# Patient Record
Sex: FEMALE | Race: WHITE | NOT HISPANIC OR LATINO | Employment: UNEMPLOYED | URBAN - NONMETROPOLITAN AREA
[De-identification: names, ages, dates, MRNs, and addresses within clinical notes are randomized per-mention and may not be internally consistent; named-entity substitution may affect disease eponyms.]

---

## 2023-06-08 DIAGNOSIS — F41.9 ANXIETY: Primary | ICD-10-CM

## 2023-06-09 RX ORDER — BUSPIRONE HYDROCHLORIDE 10 MG/1
TABLET ORAL
Qty: 270 TABLET | Refills: 0 | Status: SHIPPED | OUTPATIENT
Start: 2023-06-09 | End: 2023-11-22 | Stop reason: SDUPTHER

## 2023-06-14 ENCOUNTER — OFFICE VISIT (OUTPATIENT)
Dept: PRIMARY CARE | Facility: CLINIC | Age: 53
End: 2023-06-14
Payer: MEDICARE

## 2023-06-14 VITALS
BODY MASS INDEX: 32.07 KG/M2 | DIASTOLIC BLOOD PRESSURE: 68 MMHG | RESPIRATION RATE: 18 BRPM | HEIGHT: 70 IN | WEIGHT: 224 LBS | HEART RATE: 100 BPM | OXYGEN SATURATION: 98 % | SYSTOLIC BLOOD PRESSURE: 110 MMHG

## 2023-06-14 DIAGNOSIS — M25.531 PAIN IN BOTH WRISTS: ICD-10-CM

## 2023-06-14 DIAGNOSIS — Z00.00 HEALTHCARE MAINTENANCE: ICD-10-CM

## 2023-06-14 DIAGNOSIS — M62.81 MUSCLE WEAKNESS: ICD-10-CM

## 2023-06-14 DIAGNOSIS — R53.83 OTHER FATIGUE: Primary | ICD-10-CM

## 2023-06-14 DIAGNOSIS — E55.9 VITAMIN D DEFICIENCY: ICD-10-CM

## 2023-06-14 DIAGNOSIS — M25.532 PAIN IN BOTH WRISTS: ICD-10-CM

## 2023-06-14 DIAGNOSIS — G47.00 INSOMNIA, UNSPECIFIED TYPE: ICD-10-CM

## 2023-06-14 DIAGNOSIS — Z13.220 LIPID SCREENING: ICD-10-CM

## 2023-06-14 DIAGNOSIS — R20.0 BILATERAL HAND NUMBNESS: ICD-10-CM

## 2023-06-14 PROCEDURE — 99213 OFFICE O/P EST LOW 20 MIN: CPT | Performed by: NURSE PRACTITIONER

## 2023-06-14 RX ORDER — GABAPENTIN 100 MG/1
CAPSULE ORAL
COMMUNITY
Start: 2021-09-02 | End: 2023-11-22 | Stop reason: SDUPTHER

## 2023-06-14 RX ORDER — OMEPRAZOLE 40 MG/1
40 CAPSULE, DELAYED RELEASE ORAL
Qty: 30 CAPSULE | Refills: 81 | COMMUNITY
Start: 2016-12-12 | End: 2024-04-04 | Stop reason: SDUPTHER

## 2023-06-14 RX ORDER — TRAZODONE HYDROCHLORIDE 100 MG/1
TABLET ORAL
COMMUNITY
Start: 2021-09-02 | End: 2023-11-22 | Stop reason: SDUPTHER

## 2023-06-14 RX ORDER — SERTRALINE HYDROCHLORIDE 100 MG/1
TABLET, FILM COATED ORAL
COMMUNITY
Start: 2021-09-02 | End: 2023-11-22 | Stop reason: SDUPTHER

## 2023-06-14 ASSESSMENT — ENCOUNTER SYMPTOMS: FATIGUE: 1

## 2023-06-14 NOTE — PROGRESS NOTES
Subjective   Gisselle Avalos is a 53 y.o. female who presents for Fatigue (Exhausted, trouble staying asleep ).  Patient here because she needs some refills and she knows that are all this will be closing down permanently and I will no longer be a PCP with [facility].    She is overdue for blood work, mammogram and all screening.    Her acute concerns are as follows :  numb RT anterior hand between the first 2 fingers. No where else and no pain.  Could be carpal tunnel  Work on farm daily.  Heavy lifting  Helps take care of multiple people including her .  She states that she takes care of everybody else and puts herself last  Insomnia- on trazodone. Cannot fall asleep without the Trazodone but is not staying asleep. Has not had a sleep study. Never slept good. Always exhausted. Napping during the day  Chronic SOB which can occur out of no where. Breathless. Through r/t anxiety in the past but does not think so any more. I educated on stress and anxiety.  This has been years. Has had a workup which never revealed anything. We talked about stress, chronic inflammation, poor self care, nicotene use.   Denies ETOH  Daily nicotene.   No MJ or ellicit drug use.         Fatigue  Associated symptoms include fatigue.       The ROS have been reviewed otherwise negative except for what is in the HPI    Objective   Physical Exam  Constitutional:       Appearance: She is obese.   Cardiovascular:      Rate and Rhythm: Normal rate and regular rhythm.   Pulmonary:      Effort: Pulmonary effort is normal.      Breath sounds: Normal breath sounds.   Abdominal:      General: Bowel sounds are normal.      Palpations: Abdomen is soft.   Musculoskeletal:         General: Normal range of motion.   Neurological:      Mental Status: She is alert and oriented to person, place, and time.   Psychiatric:         Mood and Affect: Mood normal.         Behavior: Behavior normal.       Visit Vitals  /68   Pulse 100    Resp 18        Assessment/Plan   Problem List Items Addressed This Visit    None    Gisselle was seen today for fatigue.  Diagnoses and all orders for this visit:  Other fatigue (Primary)  Comments:  Multiple causes.  I think she has poor self-care.  Which she also admits to.  It is also related to chronic stress and anxiety.  Orders:  -     Vitamin B12; Future  -     Vitamin D, Total; Future  -     TSH with reflex to Free T4 if abnormal; Future  Muscle weakness  Comments:  .  Active on the arm otherwise no exercise and is not on a healthy well-balanced diet  Orders:  -     Vitamin B12; Future  -     Vitamin D, Total; Future  -     TSH with reflex to Free T4 if abnormal; Future  Healthcare maintenance  Comments:  Updated mammogram and colon cancer screening and updated blood work fasting.  Orders:  -     CBC and Auto Differential; Future  -     Comprehensive Metabolic Panel; Future  Lipid screening  Comments:  Update fasting lipid panel  Orders:  -     Lipid Panel; Future  Vitamin D deficiency  Comments:  This can lead to chronic depression chronic muscle fatigue and much more.  We will update vitamin D level  Orders:  -     Vitamin D, Total; Future  Pain in both wrists  Comments:  It is possible that this is arthritis causing inflammation and or carpal tunnel  Orders:  -     XR hand right 3+ views; Future  -     XR hand left 3+ views; Future  -     XR wrist left 3+ views; Future  -     XR wrist right 3+ views; Future  Bilateral hand numbness  Comments:  X-ray bilateral hands  Orders:  -     XR hand right 3+ views; Future  -     XR hand left 3+ views; Future  -     XR wrist left 3+ views; Future  -     XR wrist right 3+ views; Future  Insomnia, unspecified type  Comments:  I think she has more of a disordered sleep.  Continue trazodone for now however she needs a sleep study.     I explained to her that after 2 weeks I will no longer have access to the system.  She is not sure if she is staying with [facility] once this office closes down.  My fear is if I order a sleep study and mammogram and colon cancer screening the results will get missed which is very bad for her if there was any abnormal result.  At this time I think it would be most appropriate and safe for the patient she schedules with a new primary care provider and make sure that her routine diagnostic testing gets up-to-date and ordered by that.  She is agreeable and appreciative

## 2023-11-10 ENCOUNTER — APPOINTMENT (OUTPATIENT)
Dept: PRIMARY CARE | Facility: CLINIC | Age: 53
End: 2023-11-10
Payer: MEDICARE

## 2023-11-20 PROBLEM — M54.30 SCIATICA: Status: ACTIVE | Noted: 2023-11-20

## 2023-11-20 PROBLEM — G47.00 INSOMNIA: Status: ACTIVE | Noted: 2023-11-20

## 2023-11-20 PROBLEM — M54.16 LUMBAR RADICULOPATHY, ACUTE: Status: ACTIVE | Noted: 2023-11-20

## 2023-11-20 PROBLEM — M25.561 BILATERAL KNEE PAIN: Status: ACTIVE | Noted: 2023-11-20

## 2023-11-20 PROBLEM — G62.9 PERIPHERAL NEUROPATHY: Status: ACTIVE | Noted: 2023-11-20

## 2023-11-20 PROBLEM — M25.562 BILATERAL KNEE PAIN: Status: ACTIVE | Noted: 2023-11-20

## 2023-11-20 PROBLEM — R06.02 SOB (SHORTNESS OF BREATH) ON EXERTION: Status: ACTIVE | Noted: 2022-06-23

## 2023-11-20 PROBLEM — F17.200 NICOTINE DEPENDENCE: Status: ACTIVE | Noted: 2023-11-20

## 2023-11-20 PROBLEM — R30.0 DYSURIA: Status: ACTIVE | Noted: 2023-11-20

## 2023-11-20 PROBLEM — R92.1 BREAST CALCIFICATION, RIGHT: Status: ACTIVE | Noted: 2023-11-20

## 2023-11-20 PROBLEM — F41.9 ANXIETY: Status: ACTIVE | Noted: 2023-11-20

## 2023-11-20 PROBLEM — D64.9 ANEMIA: Status: ACTIVE | Noted: 2023-11-20

## 2023-11-20 PROBLEM — N39.0 ACUTE UTI: Status: RESOLVED | Noted: 2023-11-20 | Resolved: 2023-11-20

## 2023-11-22 ENCOUNTER — OFFICE VISIT (OUTPATIENT)
Dept: PRIMARY CARE | Facility: CLINIC | Age: 53
End: 2023-11-22
Payer: MEDICARE

## 2023-11-22 VITALS
SYSTOLIC BLOOD PRESSURE: 116 MMHG | HEART RATE: 85 BPM | DIASTOLIC BLOOD PRESSURE: 73 MMHG | TEMPERATURE: 98 F | BODY MASS INDEX: 36.34 KG/M2 | OXYGEN SATURATION: 93 % | HEIGHT: 67 IN | WEIGHT: 231.5 LBS

## 2023-11-22 DIAGNOSIS — E78.5 HYPERLIPIDEMIA, UNSPECIFIED HYPERLIPIDEMIA TYPE: ICD-10-CM

## 2023-11-22 DIAGNOSIS — R40.0 SOMNOLENCE, DAYTIME: ICD-10-CM

## 2023-11-22 DIAGNOSIS — F41.9 ANXIETY: ICD-10-CM

## 2023-11-22 DIAGNOSIS — R92.8 ABNORMAL MAMMOGRAM: ICD-10-CM

## 2023-11-22 DIAGNOSIS — G47.00 INSOMNIA, UNSPECIFIED TYPE: ICD-10-CM

## 2023-11-22 DIAGNOSIS — Z72.0 SMOKING TRYING TO QUIT: ICD-10-CM

## 2023-11-22 DIAGNOSIS — R20.0 BILATERAL HAND NUMBNESS: Primary | ICD-10-CM

## 2023-11-22 DIAGNOSIS — G62.89 OTHER POLYNEUROPATHY: ICD-10-CM

## 2023-11-22 PROCEDURE — 99214 OFFICE O/P EST MOD 30 MIN: CPT | Performed by: FAMILY MEDICINE

## 2023-11-22 PROCEDURE — 4004F PT TOBACCO SCREEN RCVD TLK: CPT | Performed by: FAMILY MEDICINE

## 2023-11-22 PROCEDURE — 90686 IIV4 VACC NO PRSV 0.5 ML IM: CPT | Performed by: FAMILY MEDICINE

## 2023-11-22 PROCEDURE — G0009 ADMIN PNEUMOCOCCAL VACCINE: HCPCS | Performed by: FAMILY MEDICINE

## 2023-11-22 PROCEDURE — G0008 ADMIN INFLUENZA VIRUS VAC: HCPCS | Performed by: FAMILY MEDICINE

## 2023-11-22 PROCEDURE — 90677 PCV20 VACCINE IM: CPT | Performed by: FAMILY MEDICINE

## 2023-11-22 RX ORDER — TRAZODONE HYDROCHLORIDE 100 MG/1
100 TABLET ORAL NIGHTLY
Qty: 90 TABLET | Refills: 1 | Status: SHIPPED | OUTPATIENT
Start: 2023-11-22 | End: 2024-04-04 | Stop reason: SDUPTHER

## 2023-11-22 RX ORDER — BUSPIRONE HYDROCHLORIDE 7.5 MG/1
7.5 TABLET ORAL 2 TIMES DAILY
Qty: 180 TABLET | Refills: 1 | Status: SHIPPED | OUTPATIENT
Start: 2023-11-22 | End: 2024-01-04 | Stop reason: SDUPTHER

## 2023-11-22 RX ORDER — GABAPENTIN 100 MG/1
100 CAPSULE ORAL 3 TIMES DAILY
Qty: 270 CAPSULE | Refills: 0 | Status: SHIPPED | OUTPATIENT
Start: 2023-11-22 | End: 2024-04-04 | Stop reason: SDUPTHER

## 2023-11-22 RX ORDER — SERTRALINE HYDROCHLORIDE 100 MG/1
100 TABLET, FILM COATED ORAL DAILY
Qty: 90 TABLET | Refills: 1 | Status: SHIPPED | OUTPATIENT
Start: 2023-11-22 | End: 2024-04-04 | Stop reason: SDUPTHER

## 2023-11-22 ASSESSMENT — ENCOUNTER SYMPTOMS
WEAKNESS: 0
SHORTNESS OF BREATH: 0
VOMITING: 0
CONFUSION: 0
DIARRHEA: 0
DIFFICULTY URINATING: 0
NUMBNESS: 0
UNEXPECTED WEIGHT CHANGE: 0
LOSS OF SENSATION IN FEET: 1
DIZZINESS: 0
TROUBLE SWALLOWING: 0
DYSURIA: 0
COUGH: 0
FEVER: 0
WHEEZING: 0
BLOOD IN STOOL: 0
OCCASIONAL FEELINGS OF UNSTEADINESS: 0
LIGHT-HEADEDNESS: 0
CHILLS: 0
DEPRESSION: 0
ABDOMINAL PAIN: 0
NAUSEA: 0

## 2023-11-22 ASSESSMENT — PATIENT HEALTH QUESTIONNAIRE - PHQ9
2. FEELING DOWN, DEPRESSED OR HOPELESS: NOT AT ALL
1. LITTLE INTEREST OR PLEASURE IN DOING THINGS: NOT AT ALL
SUM OF ALL RESPONSES TO PHQ9 QUESTIONS 1 AND 2: 0

## 2023-11-22 NOTE — PROGRESS NOTES
Subjective   Patient ID: Gisselle Avalos is a 53 y.o. female who presents for Establish Care (Pt presents as a new to you pt, c/o results of xrays done at previous office, requesting sleep study, pains shoot up the side of her neck, many pains, rx's needed.BL).  HPI    Previously a patient of Rosa Butt, last seen June 2023.    c/o numbness left hand due to old injury. In web between index and middle fingers, extending proximally in the dorsum of the hand, not past the wrist. More recently c/o numbness in the right hand. Started with swelling between index and middle finger metacarpal bones one morning. Still montrell swollen. Still numbness, no worse. Denies weakness in the hand. Tried Gabapentin, which she takes for the left hand already, Tylenol, Advil, soaks.    Has difficulty sleeping. Has had this insomnia for a long, long time. Tried Ambien remotely, caused sleep walking. Believes she may have restless leg syndrome. Sleeps only a couple hours at a time. Takes Trazodone, ZzQuil. Wakes up 6-7x every night, usually goes to the restroom to urinate. h/o bladder sling.    Sometimes snores. Occasional seeming apneic episodes. Denies morning headaches. Sometimes difficult staying awake during the day.    Smoking, is interested in quitting smoking. Quit once for 8y, gained 75 lbs when she quit then.      Review of Systems   Constitutional:  Negative for chills, fever and unexpected weight change.   HENT:  Negative for ear pain and trouble swallowing.    Respiratory:  Negative for cough, shortness of breath and wheezing.    Cardiovascular:  Negative for chest pain.   Gastrointestinal:  Negative for abdominal pain, blood in stool, diarrhea, nausea and vomiting.   Genitourinary:  Negative for difficulty urinating and dysuria.   Skin:  Negative for rash.   Neurological:  Negative for dizziness, syncope, weakness, light-headedness and numbness.   Psychiatric/Behavioral:  Negative for behavioral problems and confusion.   "        Objective   /73   Pulse 85   Temp 36.7 °C (98 °F)   Ht 1.702 m (5' 7\")   Wt 105 kg (231 lb 8 oz)   SpO2 93%   BMI 36.26 kg/m²     Physical Exam  Vitals and nursing note reviewed.   Constitutional:       General: She is not in acute distress.     Appearance: Normal appearance.   HENT:      Head: Normocephalic and atraumatic.   Eyes:      General: No scleral icterus.     Extraocular Movements: Extraocular movements intact.      Conjunctiva/sclera: Conjunctivae normal.   Pulmonary:      Effort: Pulmonary effort is normal. No respiratory distress.   Skin:     General: Skin is warm and dry.      Coloration: Skin is not jaundiced.   Neurological:      Mental Status: She is alert and oriented to person, place, and time. Mental status is at baseline.   Psychiatric:         Behavior: Behavior normal.         Assessment/Plan   Problem List Items Addressed This Visit       Anxiety     Inadequately controlled. Add BuSpar to be taken daily (was taking PRN). Return 1-2 months.         Relevant Medications    busPIRone (Buspar) 7.5 mg tablet    sertraline (Zoloft) 100 mg tablet    Insomnia     Check sleep study. Stop ZzQuil due to possible RLS.         Relevant Medications    traZODone (Desyrel) 100 mg tablet    Peripheral neuropathy     Continue Gabapentin.         Relevant Medications    gabapentin (Neurontin) 100 mg capsule    Bilateral hand numbness - Primary     Suspect compressed nerve branch. Refer to hand surgery.         Relevant Orders    Referral to Orthopaedic Surgery    Somnolence, daytime     Sleep study r/o apnea, PML.         Relevant Orders    In-Center Sleep Study (Non-Sleep Provider Only)    Smoking trying to quit    Hyperlipidemia     Recheck.         Relevant Orders    CBC    Comprehensive Metabolic Panel    Lipid Panel    TSH with reflex to Free T4 if abnormal    Abnormal mammogram     Sep 2021, check diagnostic.         Relevant Orders    BI mammo bilateral diagnostic tomosynthesis        "

## 2023-11-22 NOTE — PATIENT INSTRUCTIONS
Please schedule additional problem-focused appointment(s) to address additional problem(s).    Please return for your next wellness visit in 1-2 months.    Hand Surgery  Dr. Azael Gomez ([place]) [phone]  [facility] [phone]       For assistance with scheduling referrals or consultations, please call [phone]. For laboratory, radiology, and other tests, please call [phone] ([phone] for pediatrics). Please review prescription inserts and published information for possible adverse effects of all medications. Return after testing or consultation to review results and recommendations, if symptoms persist, change, worsen, or return, or if you have any question or concern. If you do not get results within 7-10 days, or you have any question or concern, please send a message, call the office ([phone]), or return to the office for a follow-up appointment. For non-emergencies, you may call the office. For emergencies, call 9-1-1 or go to the nearest Emergency Department. Please schedule additional appointment(s) to address concern(s) not addressed today.    In general, results are not released or discussed over the telephone. Results of tests done through [facility] are released via  Lifeables (see below).  [email or web address]   Lifeables support line: [phone]    Until we complete our transition to the new system, additional information can be found at [email or web address] or on your Android or iOS (iPhone, iPad) device using the [employer] keyshawn available free of charge in your device's keyshawn store.

## 2023-12-19 ENCOUNTER — APPOINTMENT (OUTPATIENT)
Dept: ORTHOPEDIC SURGERY | Facility: CLINIC | Age: 53
End: 2023-12-19
Payer: MEDICARE

## 2023-12-20 ENCOUNTER — HOSPITAL ENCOUNTER (OUTPATIENT)
Dept: RADIOLOGY | Facility: HOSPITAL | Age: 53
Discharge: HOME | End: 2023-12-20
Payer: MEDICARE

## 2023-12-20 ENCOUNTER — ANCILLARY PROCEDURE (OUTPATIENT)
Dept: RADIOLOGY | Facility: HOSPITAL | Age: 53
End: 2023-12-20
Payer: MEDICARE

## 2023-12-20 DIAGNOSIS — R92.8 ABNORMAL MAMMOGRAM: ICD-10-CM

## 2023-12-20 DIAGNOSIS — R92.8 OTHER ABNORMAL AND INCONCLUSIVE FINDINGS ON DIAGNOSTIC IMAGING OF BREAST: ICD-10-CM

## 2023-12-20 PROCEDURE — G0279 TOMOSYNTHESIS, MAMMO: HCPCS | Performed by: RADIOLOGY

## 2023-12-20 PROCEDURE — 77066 DX MAMMO INCL CAD BI: CPT

## 2023-12-20 PROCEDURE — 77066 DX MAMMO INCL CAD BI: CPT | Performed by: RADIOLOGY

## 2023-12-21 DIAGNOSIS — R92.8 ABNORMAL MAMMOGRAM: Primary | ICD-10-CM

## 2024-01-04 ENCOUNTER — OFFICE VISIT (OUTPATIENT)
Dept: PRIMARY CARE | Facility: CLINIC | Age: 54
End: 2024-01-04
Payer: MEDICARE

## 2024-01-04 VITALS
HEART RATE: 82 BPM | WEIGHT: 231 LBS | DIASTOLIC BLOOD PRESSURE: 74 MMHG | OXYGEN SATURATION: 95 % | BODY MASS INDEX: 36.26 KG/M2 | HEIGHT: 67 IN | SYSTOLIC BLOOD PRESSURE: 113 MMHG

## 2024-01-04 DIAGNOSIS — F41.9 ANXIETY: ICD-10-CM

## 2024-01-04 DIAGNOSIS — R92.8 ABNORMAL MAMMOGRAM: Primary | ICD-10-CM

## 2024-01-04 DIAGNOSIS — M25.50 POLYARTHRALGIA: ICD-10-CM

## 2024-01-04 PROCEDURE — 99214 OFFICE O/P EST MOD 30 MIN: CPT | Performed by: FAMILY MEDICINE

## 2024-01-04 RX ORDER — PREDNISONE 10 MG/1
TABLET ORAL
Qty: 20 TABLET | Refills: 0 | Status: SHIPPED | OUTPATIENT
Start: 2024-01-04 | End: 2024-01-04 | Stop reason: SDUPTHER

## 2024-01-04 RX ORDER — PREDNISONE 10 MG/1
TABLET ORAL
Qty: 20 TABLET | Refills: 0 | Status: SHIPPED | OUTPATIENT
Start: 2024-01-04 | End: 2024-01-17

## 2024-01-04 RX ORDER — BUSPIRONE HYDROCHLORIDE 10 MG/1
10 TABLET ORAL 2 TIMES DAILY
Qty: 180 TABLET | Refills: 1 | Status: SHIPPED | OUTPATIENT
Start: 2024-01-04 | End: 2024-04-04 | Stop reason: SDUPTHER

## 2024-01-04 ASSESSMENT — ENCOUNTER SYMPTOMS
DIARRHEA: 0
LIGHT-HEADEDNESS: 0
DYSURIA: 0
TROUBLE SWALLOWING: 0
NAUSEA: 0
CONFUSION: 0
ABDOMINAL PAIN: 0
DIZZINESS: 0
WEAKNESS: 0
FEVER: 0
UNEXPECTED WEIGHT CHANGE: 0
WHEEZING: 0
CHILLS: 0
VOMITING: 0
DIFFICULTY URINATING: 0
BLOOD IN STOOL: 0
NUMBNESS: 0
SHORTNESS OF BREATH: 0
COUGH: 0

## 2024-01-04 ASSESSMENT — PATIENT HEALTH QUESTIONNAIRE - PHQ9
SUM OF ALL RESPONSES TO PHQ9 QUESTIONS 1 AND 2: 0
1. LITTLE INTEREST OR PLEASURE IN DOING THINGS: NOT AT ALL
2. FEELING DOWN, DEPRESSED OR HOPELESS: NOT AT ALL

## 2024-01-04 NOTE — PATIENT INSTRUCTIONS
Please return for a(n) anxiety follow-up appointment in 3 months, earlier if any question or concern. Please return or seek medical attention if symptoms persist, change, worsen, or return. For emergencies, call 9-1-1 or go to the nearest Emergency Room.    Rheumatology   Dr. Melina Gallardo ([place]) [phone]  Dr. Eli Wong ([place]) [phone]  Dr. Man Rucker ([place]) [phone]  iggy Black ([place]) [phone]      Podiatry  Dr. Norah Baron ([facility]) [phone]  Dr. Evelyn Fowler ([facility]) [phone]  Dr. Ed Burnett ([place]) [phone]  Dr. Tosha Scott ([facility]) [phone]  Dr. Alice Bryan ([facility]) [phone]  Dr. Samuel Thompson ([facility]) [phone]  Dr. Meg Castillo ([place]) [phone]    Avoid taking Biotin for a week prior to any blood tests, as it can interfere with certain results.  Fasting for labs means 12 hours, nothing to eat or drink, except water and medications, unless directed otherwise.    For assistance with scheduling referrals or consultations, please call [phone]. For laboratory, radiology, and other tests, please call [phone] ([phone] for pediatrics). Please review prescription inserts and published information for possible adverse effects of all medications. Return after testing or consultation to review results and recommendations, if symptoms persist, change, worsen, or return, or if you have any question or concern. If you do not get results within 7-10 days, or you have any question or concern, please send a message, call the office ([phone]), or return to the office for a follow-up appointment. For non-emergencies, you may call the office. For emergencies, call 9-1-1 or go to the nearest Emergency Department. Please schedule additional appointment(s) to address concern(s) not addressed today.    In general, results are not  released or discussed over the telephone, but at an appointment or via  Right Skills. Results of tests done through [facility] are released via  Right Skills (see below).  [email or web address]   Right Skills support line: [phone]

## 2024-01-04 NOTE — PROGRESS NOTES
"Subjective   Patient ID: Gisselle Avalos is a 53 y.o. female who presents for Follow-up (Pt presents in med review, rx's needed.BL).  HPI    Tried Magnesium for her restless legs, made a huge difference (about \"99% better\"). Also taking Gabapentin for her arm.    c/o knees, hips, ankles, elbows, \"the whole thing.\" Has had a steroid taper before, which has helped significantly with her joint pain.    Requests referral to podiatry.    Review of Systems   Constitutional:  Negative for chills, fever and unexpected weight change.   HENT:  Negative for ear pain and trouble swallowing.    Respiratory:  Negative for cough, shortness of breath and wheezing.    Cardiovascular:  Negative for chest pain.   Gastrointestinal:  Negative for abdominal pain, blood in stool, diarrhea, nausea and vomiting.   Genitourinary:  Negative for difficulty urinating and dysuria.   Skin:  Negative for rash.   Neurological:  Negative for dizziness, syncope, weakness, light-headedness and numbness.   Psychiatric/Behavioral:  Negative for behavioral problems and confusion.          Objective   /74   Pulse 82   Ht 1.702 m (5' 7\")   Wt 105 kg (231 lb)   SpO2 95%   BMI 36.18 kg/m²     Physical Exam  Vitals and nursing note reviewed.   Constitutional:       General: She is not in acute distress.     Appearance: Normal appearance.   HENT:      Head: Normocephalic and atraumatic.   Eyes:      General: No scleral icterus.     Extraocular Movements: Extraocular movements intact.      Conjunctiva/sclera: Conjunctivae normal.   Pulmonary:      Effort: Pulmonary effort is normal. No respiratory distress.   Skin:     General: Skin is warm and dry.      Coloration: Skin is not jaundiced.   Neurological:      Mental Status: She is alert and oriented to person, place, and time. Mental status is at baseline.   Psychiatric:         Behavior: Behavior normal.         Assessment/Plan   Problem List Items Addressed This Visit       Anxiety     " Improved with regular taking of BuSpar, but increase to 10 mg BID.         Relevant Medications    busPIRone (Buspar) 10 mg tablet    Abnormal mammogram - Primary     Follow-up diagnostic left mammogram late June 2024.         Relevant Orders    BI mammo left diagnostic tomosynthesis    Polyarthralgia     Follow-up with rheumatology. Requests steroid taper, which has worked well for her in the past.         Relevant Medications    predniSONE (Deltasone) 10 mg tablet    Other Relevant Orders    Referral to Rheumatology    Referral to Podiatry     c/o heart racing which she previously attributed to anxiety, but  said she should bring it up. Advised dedicated appointment to properly address this, but to go to the ER or call 911 if it recurs. She will schedule an appointment for this.

## 2024-01-16 ENCOUNTER — CLINICAL SUPPORT (OUTPATIENT)
Dept: SLEEP MEDICINE | Facility: CLINIC | Age: 54
End: 2024-01-16
Payer: MEDICARE

## 2024-01-16 DIAGNOSIS — R40.0 SOMNOLENCE, DAYTIME: ICD-10-CM

## 2024-01-16 DIAGNOSIS — G47.33 OBSTRUCTIVE SLEEP APNEA (ADULT) (PEDIATRIC): ICD-10-CM

## 2024-01-16 PROCEDURE — 95811 POLYSOM 6/>YRS CPAP 4/> PARM: CPT | Performed by: STUDENT IN AN ORGANIZED HEALTH CARE EDUCATION/TRAINING PROGRAM

## 2024-01-17 VITALS
RESPIRATION RATE: 24 BRPM | SYSTOLIC BLOOD PRESSURE: 137 MMHG | BODY MASS INDEX: 36.33 KG/M2 | OXYGEN SATURATION: 94 % | HEIGHT: 67 IN | DIASTOLIC BLOOD PRESSURE: 78 MMHG | WEIGHT: 231.48 LBS

## 2024-01-17 ASSESSMENT — SLEEP AND FATIGUE QUESTIONNAIRES
HOW LIKELY ARE YOU TO NOD OFF OR FALL ASLEEP WHILE LYING DOWN TO REST IN THE AFTERNOON WHEN CIRCUMSTANCES PERMIT: HIGH CHANCE OF DOZING
SITING INACTIVE IN A PUBLIC PLACE LIKE A CLASS ROOM OR A MOVIE THEATER: HIGH CHANCE OF DOZING
HOW LIKELY ARE YOU TO NOD OFF OR FALL ASLEEP WHILE SITTING AND READING: HIGH CHANCE OF DOZING
HOW LIKELY ARE YOU TO NOD OFF OR FALL ASLEEP WHILE SITTING AND TALKING TO SOMEONE: HIGH CHANCE OF DOZING
HOW LIKELY ARE YOU TO NOD OFF OR FALL ASLEEP WHILE SITTING QUIETLY AFTER LUNCH WITHOUT ALCOHOL: HIGH CHANCE OF DOZING
HOW LIKELY ARE YOU TO NOD OFF OR FALL ASLEEP WHILE WATCHING TV: HIGH CHANCE OF DOZING
HOW LIKELY ARE YOU TO NOD OFF OR FALL ASLEEP WHEN YOU ARE A PASSENGER IN A CAR FOR AN HOUR WITHOUT A BREAK: HIGH CHANCE OF DOZING
ESS-CHAD TOTAL SCORE: 21
HOW LIKELY ARE YOU TO NOD OFF OR FALL ASLEEP IN A CAR, WHILE STOPPED FOR A FEW MINUTES IN TRAFFIC: WOULD NEVER DOZE

## 2024-01-17 NOTE — PROGRESS NOTES
[facility] TECH NOTE:     Patient: Gisselle Avalos   MRN//AGE: [id number]  1970  53 y.o.   Technologist: Donya Cano   Room: 4   Service Date: 2024        Sleep Testing Location: [place]  Neck: 37.5 cm  Dona Ana: 21    TECHNOLOGIST SLEEP STUDY PROCEDURE NOTE:   This sleep study is being conducted according to the policies and procedures outlined by the AAS accreditation standards.  The sleep study procedure and processes involved during this appointment was explained to the patient/patient’s family, questions were answered. The patient/family verbalized understanding.      The patient is a 53 year old female scheduled for a split night study with montage of: standard.  She arrived for her appointment.      The study that was ultimately completed was a split night study with montage of: standard.    The full study was completed.  Patient questionnaires completed?: yes     Consents signed? yes    Initial Fall Risk Screening:     Gisselle has not fallen in the last 6 months. Gisselle does not have a fear of falling. She does not need assistance with sitting, standing, or walking. She does not need assistance walking in her home. She does not need assistance in an unfamiliar setting. The patient is not using an assistive device.     Brief Study observations: Patient presented to the sleep lab for a potential split night study.  Split criteria was met.     Deviation to order/protocol and reason: n/a      If PAP, which was preferred mask/pressure/mode: CPAP / [employer] Simplus full face mask (small)      Other: n/a    After the procedure, the patient/family was informed to ensure followup with ordering clinician for testing results.      Technologist: Donya Cano

## 2024-01-26 PROBLEM — S81.859A DOG BITE OF LOWER LEG: Status: RESOLVED | Noted: 2022-09-19 | Resolved: 2024-01-26

## 2024-01-26 PROBLEM — J20.9 ACUTE BRONCHITIS: Status: RESOLVED | Noted: 2024-01-26 | Resolved: 2024-01-26
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
## 2024-01-26 PROBLEM — R55 SYNCOPE AND COLLAPSE: Status: ACTIVE | Noted: 2022-09-09

## 2024-02-13 ENCOUNTER — TELEPHONE (OUTPATIENT)
Dept: PRIMARY CARE | Facility: CLINIC | Age: 54
End: 2024-02-13
Payer: MEDICARE

## 2024-02-13 NOTE — TELEPHONE ENCOUNTER
----- Message from Alex Crowley DO sent at 2/5/2024 11:24 AM EST -----  Sleep study report describes:  Severe sleep apnea.  Some options include:  Auto-titrating CPAP (Continuous Positive Airway Pressure) therapy.  PAP (Positive Airway Pressure) titration study, then appropriate PAP therapy.  Follow-up with a sleep medicine specialist.  Sleep Medicine  Dr. Deidre Knutson, Emeka Ng PA-C, Thierry Larson ([place]) [phone]  Dr. Nadeem Sanchez ([place]) [phone]  Dr. Rafita Lawler ([place]) [phone]    Pediatric Sleep Medicine  Dr. Kaitlyn Villaseñor ([place]) [phone]  Recommend avoidance of driving or operating dangerous machinery when until daytime sleepiness resolves. Avoid alcohol and other sedating drugs or medications, as much as possible. Weight loss generally helps with obstructive sleep apnea. Avoidance of supine (i.e., on the back) sleeping posture may be beneficial.  Recommend a follow-up appointment, to discuss results and options, or if any question or concern.    If auto-CPAP is chosen, would start at 4-10 cm H2O. F&P Simplus (small). with EPR/Flex of 1.

## 2024-04-04 ENCOUNTER — OFFICE VISIT (OUTPATIENT)
Dept: PRIMARY CARE | Facility: CLINIC | Age: 54
End: 2024-04-04
Payer: MEDICARE

## 2024-04-04 ENCOUNTER — LAB (OUTPATIENT)
Dept: LAB | Facility: LAB | Age: 54
End: 2024-04-04
Payer: MEDICARE

## 2024-04-04 VITALS
WEIGHT: 229.13 LBS | HEIGHT: 67 IN | DIASTOLIC BLOOD PRESSURE: 70 MMHG | OXYGEN SATURATION: 95 % | HEART RATE: 74 BPM | SYSTOLIC BLOOD PRESSURE: 108 MMHG | BODY MASS INDEX: 35.96 KG/M2

## 2024-04-04 DIAGNOSIS — K21.9 GASTROESOPHAGEAL REFLUX DISEASE, UNSPECIFIED WHETHER ESOPHAGITIS PRESENT: ICD-10-CM

## 2024-04-04 DIAGNOSIS — G47.00 INSOMNIA, UNSPECIFIED TYPE: ICD-10-CM

## 2024-04-04 DIAGNOSIS — G47.33 OSA (OBSTRUCTIVE SLEEP APNEA): Primary | ICD-10-CM

## 2024-04-04 DIAGNOSIS — Z13.6 SCREENING FOR HEART DISEASE: ICD-10-CM

## 2024-04-04 DIAGNOSIS — R06.00 PND (PAROXYSMAL NOCTURNAL DYSPNEA): ICD-10-CM

## 2024-04-04 DIAGNOSIS — G62.89 OTHER POLYNEUROPATHY: ICD-10-CM

## 2024-04-04 DIAGNOSIS — E78.5 HYPERLIPIDEMIA, UNSPECIFIED HYPERLIPIDEMIA TYPE: ICD-10-CM

## 2024-04-04 DIAGNOSIS — F41.9 ANXIETY: ICD-10-CM

## 2024-04-04 LAB
ALBUMIN SERPL BCP-MCNC: 4.5 G/DL (ref 3.4–5)
ALP SERPL-CCNC: 68 U/L (ref 33–110)
ALT SERPL W P-5'-P-CCNC: 21 U/L (ref 7–45)
ANION GAP SERPL CALC-SCNC: 12 MMOL/L (ref 10–20)
AST SERPL W P-5'-P-CCNC: 18 U/L (ref 9–39)
BILIRUB SERPL-MCNC: 0.4 MG/DL (ref 0–1.2)
BUN SERPL-MCNC: 15 MG/DL (ref 6–23)
CALCIUM SERPL-MCNC: 9.7 MG/DL (ref 8.6–10.3)
CHLORIDE SERPL-SCNC: 104 MMOL/L (ref 98–107)
CHOLEST SERPL-MCNC: 254 MG/DL (ref 0–199)
CHOLESTEROL/HDL RATIO: 5
CO2 SERPL-SCNC: 28 MMOL/L (ref 21–32)
CREAT SERPL-MCNC: 0.75 MG/DL (ref 0.5–1.05)
EGFRCR SERPLBLD CKD-EPI 2021: >90 ML/MIN/1.73M*2
ERYTHROCYTE [DISTWIDTH] IN BLOOD BY AUTOMATED COUNT: 14 % (ref 11.5–14.5)
GLUCOSE SERPL-MCNC: 101 MG/DL (ref 74–99)
HCT VFR BLD AUTO: 47.1 % (ref 36–46)
HDLC SERPL-MCNC: 50.5 MG/DL
HGB BLD-MCNC: 14.9 G/DL (ref 12–16)
LDLC SERPL CALC-MCNC: 176 MG/DL
MCH RBC QN AUTO: 28.6 PG (ref 26–34)
MCHC RBC AUTO-ENTMCNC: 31.6 G/DL (ref 32–36)
MCV RBC AUTO: 90 FL (ref 80–100)
NON HDL CHOLESTEROL: 204 MG/DL (ref 0–149)
NRBC BLD-RTO: 0 /100 WBCS (ref 0–0)
PLATELET # BLD AUTO: 367 X10*3/UL (ref 150–450)
POTASSIUM SERPL-SCNC: 4.8 MMOL/L (ref 3.5–5.3)
PROT SERPL-MCNC: 7 G/DL (ref 6.4–8.2)
RBC # BLD AUTO: 5.21 X10*6/UL (ref 4–5.2)
SODIUM SERPL-SCNC: 139 MMOL/L (ref 136–145)
TRIGL SERPL-MCNC: 139 MG/DL (ref 0–149)
TSH SERPL-ACNC: 1.69 MIU/L (ref 0.44–3.98)
VLDL: 28 MG/DL (ref 0–40)
WBC # BLD AUTO: 7.1 X10*3/UL (ref 4.4–11.3)

## 2024-04-04 PROCEDURE — 99214 OFFICE O/P EST MOD 30 MIN: CPT | Performed by: FAMILY MEDICINE

## 2024-04-04 PROCEDURE — 84443 ASSAY THYROID STIM HORMONE: CPT

## 2024-04-04 PROCEDURE — 80053 COMPREHEN METABOLIC PANEL: CPT

## 2024-04-04 PROCEDURE — 85027 COMPLETE CBC AUTOMATED: CPT

## 2024-04-04 PROCEDURE — 80061 LIPID PANEL: CPT

## 2024-04-04 PROCEDURE — 36415 COLL VENOUS BLD VENIPUNCTURE: CPT

## 2024-04-04 RX ORDER — SERTRALINE HYDROCHLORIDE 100 MG/1
100 TABLET, FILM COATED ORAL DAILY
Qty: 90 TABLET | Refills: 1 | Status: SHIPPED | OUTPATIENT
Start: 2024-04-04

## 2024-04-04 RX ORDER — TRAZODONE HYDROCHLORIDE 100 MG/1
100 TABLET ORAL NIGHTLY
Qty: 90 TABLET | Refills: 1 | Status: SHIPPED | OUTPATIENT
Start: 2024-04-04 | End: 2024-05-28 | Stop reason: ALTCHOICE

## 2024-04-04 RX ORDER — BUSPIRONE HYDROCHLORIDE 10 MG/1
10 TABLET ORAL 2 TIMES DAILY
Qty: 180 TABLET | Refills: 1 | Status: SHIPPED | OUTPATIENT
Start: 2024-04-04

## 2024-04-04 RX ORDER — GABAPENTIN 100 MG/1
100 CAPSULE ORAL 3 TIMES DAILY
Qty: 270 CAPSULE | Refills: 1 | Status: SHIPPED | OUTPATIENT
Start: 2024-04-04

## 2024-04-04 RX ORDER — OMEPRAZOLE 40 MG/1
40 CAPSULE, DELAYED RELEASE ORAL
Qty: 90 CAPSULE | Refills: 1 | Status: SHIPPED | OUTPATIENT
Start: 2024-04-04

## 2024-04-04 ASSESSMENT — ENCOUNTER SYMPTOMS
TROUBLE SWALLOWING: 0
UNEXPECTED WEIGHT CHANGE: 0
DYSURIA: 0
OCCASIONAL FEELINGS OF UNSTEADINESS: 0
LOSS OF SENSATION IN FEET: 0
DIARRHEA: 0
COUGH: 0
DIFFICULTY URINATING: 0
ABDOMINAL PAIN: 0
DIZZINESS: 0
CHILLS: 0
NAUSEA: 0
NUMBNESS: 0
FEVER: 0
WEAKNESS: 0
CONFUSION: 0
SHORTNESS OF BREATH: 0
BLOOD IN STOOL: 0
DEPRESSION: 0
SLEEP DISTURBANCE: 1
VOMITING: 0
LIGHT-HEADEDNESS: 0
WHEEZING: 0

## 2024-04-04 ASSESSMENT — ANXIETY QUESTIONNAIRES
4. TROUBLE RELAXING: NOT AT ALL
IF YOU CHECKED OFF ANY PROBLEMS ON THIS QUESTIONNAIRE, HOW DIFFICULT HAVE THESE PROBLEMS MADE IT FOR YOU TO DO YOUR WORK, TAKE CARE OF THINGS AT HOME, OR GET ALONG WITH OTHER PEOPLE: SOMEWHAT DIFFICULT
7. FEELING AFRAID AS IF SOMETHING AWFUL MIGHT HAPPEN: NOT AT ALL
6. BECOMING EASILY ANNOYED OR IRRITABLE: NOT AT ALL
5. BEING SO RESTLESS THAT IT IS HARD TO SIT STILL: NOT AT ALL
3. WORRYING TOO MUCH ABOUT DIFFERENT THINGS: NEARLY EVERY DAY
1. FEELING NERVOUS, ANXIOUS, OR ON EDGE: NOT AT ALL
GAD7 TOTAL SCORE: 3
2. NOT BEING ABLE TO STOP OR CONTROL WORRYING: NOT AT ALL

## 2024-04-04 ASSESSMENT — PATIENT HEALTH QUESTIONNAIRE - PHQ9
2. FEELING DOWN, DEPRESSED OR HOPELESS: SEVERAL DAYS
1. LITTLE INTEREST OR PLEASURE IN DOING THINGS: SEVERAL DAYS
10. IF YOU CHECKED OFF ANY PROBLEMS, HOW DIFFICULT HAVE THESE PROBLEMS MADE IT FOR YOU TO DO YOUR WORK, TAKE CARE OF THINGS AT HOME, OR GET ALONG WITH OTHER PEOPLE: SOMEWHAT DIFFICULT
SUM OF ALL RESPONSES TO PHQ9 QUESTIONS 1 AND 2: 2

## 2024-04-04 NOTE — ASSESSMENT & PLAN NOTE
Discussed risks of sedating medications and drugs with untreated sleep apnea, she wishes to continue Trazodone despite this.

## 2024-04-04 NOTE — PATIENT INSTRUCTIONS
Proton Pump Inhibitors (PPI, e.g., Prilosec/omeprazole, Nexium/esomeprazole, Protonix/pantoprazole, and others) may cause vitamin or mineral deficiencies, kidney damage, dementia, stomach polyps, fractures and thinning of the bones (osteoporosis), intestinal infections including C. diff., lupus. Some of these side effects are more likely with chronic use. There are other possible side effects, and it is recommended, as with any medication, to review all possible side effects. Chronic PPI use may be necessary in certain situations (e.g., Solo esophagus).    Please return for a(n) follow-up appointment in 6 months, earlier if any question or concern.    Avoid taking Biotin for a week prior to any blood tests, as it can interfere with certain results. Fasting for labs means 12 hours, nothing to eat or drink, except water and medications, unless directed otherwise.    For assistance with scheduling referrals or consultations, please call [phone]. For laboratory, radiology, and other tests, please call [phone] ([phone] for pediatrics). Please review prescription inserts and published information for possible adverse effects of all medications. Return after testing or consultation to review results and recommendations, if symptoms persist, change, worsen, or return, or if you have any question or concern. If you do not get results within 7-10 days, or you have any question or concern, please send a message, call the office ([phone]), or return to the office for a follow-up appointment. For non-emergencies, you may call the office. For emergencies, call 9-1-1 or go to the nearest Emergency Department. Please schedule additional appointment(s) to address concern(s) not addressed today.    In general, results are not released or discussed over the telephone, but at an appointment or via  Stickybits. Results of tests done through [facility] are released via  Stickybits (see  below).  [email or web address]  [facility] MyChart support line: [phone]

## 2024-04-04 NOTE — PROGRESS NOTES
"Subjective   Patient ID: Gisselle Avalos is a 53 y.o. female who presents for Follow-up (Pt presents in F/U, states never had cardiac testing done, did have sleep study, appt. Sleep medicine appt. End of May, rx's needed.BL).  HPI Historian(s): Self    Generally feeling well, except daytime sleepiness, insomnia.    Denies h/o  Solo esophagus, or being told she has to take PPI indefinitely.    Review of Systems   Constitutional:  Negative for chills, fever and unexpected weight change.   HENT:  Negative for ear pain and trouble swallowing.    Respiratory:  Negative for cough, shortness of breath and wheezing.    Cardiovascular:  Negative for chest pain.   Gastrointestinal:  Negative for abdominal pain, blood in stool, diarrhea, nausea and vomiting.   Genitourinary:  Negative for difficulty urinating and dysuria.   Skin:  Negative for rash.   Neurological:  Negative for dizziness, syncope, weakness, light-headedness and numbness.   Psychiatric/Behavioral:  Positive for sleep disturbance. Negative for behavioral problems and confusion.          Objective   /70   Pulse 74   Ht 1.702 m (5' 7\")   Wt 104 kg (229 lb 2 oz)   SpO2 95%   BMI 35.89 kg/m²     Physical Exam  Vitals and nursing note reviewed.   Constitutional:       General: She is not in acute distress.     Appearance: Normal appearance.      Comments: No assistive device presently being used.   HENT:      Head: Normocephalic and atraumatic.   Eyes:      General: No scleral icterus.     Extraocular Movements: Extraocular movements intact.      Conjunctiva/sclera: Conjunctivae normal.   Pulmonary:      Effort: Pulmonary effort is normal. No respiratory distress.   Skin:     General: Skin is warm and dry.      Coloration: Skin is not jaundiced.   Neurological:      Mental Status: She is alert and oriented to person, place, and time. Mental status is at baseline.   Psychiatric:         Behavior: Behavior normal.         Assessment/Plan   Problem " List Items Addressed This Visit       Anxiety     Well controlled.          Relevant Medications    busPIRone (Buspar) 10 mg tablet    sertraline (Zoloft) 100 mg tablet    Insomnia     Discussed risks of sedating medications and drugs with untreated sleep apnea, she wishes to continue Trazodone despite this.         Relevant Medications    traZODone (Desyrel) 100 mg tablet    Peripheral neuropathy     Continue Gabapentin.         Relevant Medications    gabapentin (Neurontin) 100 mg capsule    THIERRY (obstructive sleep apnea) - Primary     Keep appointment with sleep specialist.         Gastroesophageal reflux disease     Discussed PPI risks, she will try to minimize or stop use.         Relevant Medications    omeprazole (PriLOSEC) 40 mg DR capsule    PND (paroxysmal nocturnal dyspnea)    Relevant Orders    Transthoracic Echo Complete    Screening for heart disease    Relevant Orders    ECG 12 lead (Ancillary Performed)

## 2024-04-16 ENCOUNTER — HOSPITAL ENCOUNTER (OUTPATIENT)
Dept: CARDIOLOGY | Facility: HOSPITAL | Age: 54
Discharge: HOME | End: 2024-04-16
Payer: MEDICARE

## 2024-04-16 DIAGNOSIS — Z13.6 SCREENING FOR HEART DISEASE: ICD-10-CM

## 2024-04-16 DIAGNOSIS — R06.00 PND (PAROXYSMAL NOCTURNAL DYSPNEA): ICD-10-CM

## 2024-04-16 PROCEDURE — 93005 ELECTROCARDIOGRAM TRACING: CPT

## 2024-04-16 PROCEDURE — 93306 TTE W/DOPPLER COMPLETE: CPT

## 2024-04-17 LAB
AORTIC VALVE MEAN GRADIENT: 6.4 MMHG
AORTIC VALVE PEAK VELOCITY: 1.67 M/S
AV PEAK GRADIENT: 11.1 MMHG
AVA (PEAK VEL): 2.11 CM2
AVA (VTI): 2.16 CM2
EJECTION FRACTION APICAL 4 CHAMBER: 50.6
LEFT ATRIUM VOLUME AREA LENGTH INDEX BSA: 20.5 ML/M2
LEFT VENTRICLE INTERNAL DIMENSION DIASTOLE: 4.58 CM (ref 3.5–6)
LEFT VENTRICULAR OUTFLOW TRACT DIAMETER: 1.96 CM
LV EJECTION FRACTION BIPLANE: 54 %
MITRAL VALVE E/A RATIO: 0.92
MITRAL VALVE E/E' RATIO: 10.36
RIGHT VENTRICLE FREE WALL PEAK S': 19 CM/S
RIGHT VENTRICLE PEAK SYSTOLIC PRESSURE: 23.1 MMHG
TRICUSPID ANNULAR PLANE SYSTOLIC EXCURSION: 2.3 CM

## 2024-05-08 LAB
ATRIAL RATE: 76 BPM
P AXIS: 63 DEGREES
P OFFSET: 199 MS
P ONSET: 148 MS
PR INTERVAL: 140 MS
Q ONSET: 218 MS
QRS COUNT: 12 BEATS
QRS DURATION: 94 MS
QT INTERVAL: 382 MS
QTC CALCULATION(BAZETT): 429 MS
QTC FREDERICIA: 413 MS
R AXIS: 58 DEGREES
T AXIS: 44 DEGREES
T OFFSET: 409 MS
VENTRICULAR RATE: 76 BPM

## 2024-05-15 ENCOUNTER — OFFICE VISIT (OUTPATIENT)
Dept: SLEEP MEDICINE | Facility: CLINIC | Age: 54
End: 2024-05-15
Payer: MEDICARE

## 2024-05-15 DIAGNOSIS — G25.81 RLS (RESTLESS LEGS SYNDROME): ICD-10-CM

## 2024-05-15 DIAGNOSIS — E83.10 DISORDER OF IRON METABOLISM: ICD-10-CM

## 2024-05-15 DIAGNOSIS — E55.9 VITAMIN D DEFICIENCY: ICD-10-CM

## 2024-05-15 DIAGNOSIS — G47.33 OBSTRUCTIVE SLEEP APNEA: Primary | ICD-10-CM

## 2024-05-15 PROCEDURE — 99204 OFFICE O/P NEW MOD 45 MIN: CPT | Performed by: INTERNAL MEDICINE

## 2024-05-15 PROCEDURE — G2211 COMPLEX E/M VISIT ADD ON: HCPCS | Performed by: INTERNAL MEDICINE

## 2024-05-15 ASSESSMENT — SLEEP AND FATIGUE QUESTIONNAIRES
DIFFICULTY_FALLING_ASLEEP: VERY SEVERE
SLEEP_PROBLEM_NOTICEABLE_TO_OTHERS: VERY MUCH NOTICEABLE
WORRIED_DISTRESSED_DUE_TO_SLEEP: VERY MUCH NOTICEABLE
SATISFACTION_WITH_CURRENT_SLEEP_PATTERN: VERY DISSATISFIED
HOW LIKELY ARE YOU TO NOD OFF OR FALL ASLEEP WHILE SITTING AND TALKING TO SOMEONE: WOULD NEVER DOZE
HOW LIKELY ARE YOU TO NOD OFF OR FALL ASLEEP WHEN YOU ARE A PASSENGER IN A CAR FOR AN HOUR WITHOUT A BREAK: HIGH CHANCE OF DOZING
SLEEP_PROBLEM_INTERFERES_DAILY_ACTIVITIES: VERY MUCH NOTICEABLE
ESS-CHAD TOTAL SCORE: 15
WAKING_TOO_EARLY: VERY SEVERE
HOW LIKELY ARE YOU TO NOD OFF OR FALL ASLEEP WHILE SITTING QUIETLY AFTER LUNCH WITHOUT ALCOHOL: HIGH CHANCE OF DOZING
HOW LIKELY ARE YOU TO NOD OFF OR FALL ASLEEP IN A CAR, WHILE STOPPED FOR A FEW MINUTES IN TRAFFIC: WOULD NEVER DOZE
SITING INACTIVE IN A PUBLIC PLACE LIKE A CLASS ROOM OR A MOVIE THEATER: WOULD NEVER DOZE
HOW LIKELY ARE YOU TO NOD OFF OR FALL ASLEEP WHILE LYING DOWN TO REST IN THE AFTERNOON WHEN CIRCUMSTANCES PERMIT: HIGH CHANCE OF DOZING
HOW LIKELY ARE YOU TO NOD OFF OR FALL ASLEEP WHILE WATCHING TV: HIGH CHANCE OF DOZING
HOW LIKELY ARE YOU TO NOD OFF OR FALL ASLEEP WHILE SITTING AND READING: HIGH CHANCE OF DOZING

## 2024-05-15 ASSESSMENT — PAIN SCALES - GENERAL: PAINLEVEL: 4

## 2024-05-15 NOTE — PROGRESS NOTES
[facility] SLEEP MEDICINE CLINIC  NEW CONSULT VISIT NOTE    Virtual or Telephone Consent  An interactive audio and video telecommunication system which permits real time communications between the patient (at the originating site) and provider (at the distant site) was utilized to provide this telehealth service.   The patient was informed about the telehealth clinical encounter including benefits to avoiding travel, limitations of the assessment, and billing for the service. In-office care may be recommended if needed. Telehealth sessions are not being recorded and personal health information is protected. All questions were answered and verbal consent from the patient (or guardian) was obtained to proceed.     PCP: Alex Crowley DO  Referred by: No ref. provider found    HISTORY OF PRESENT ILLNESS     Patient ID: Gisselle Avalos is a 53 y.o. female who presents to [facility] for a comprehensive sleep medicine evaluation with concerns of sleep apnea recently diagnosed.    Patient is here alone today.  To review, patient's medical history is notable for obesity (BMI 35), allergic rhinitis, GERD, fatty liver, anxiety, depression, RLS, and THIERRY    Patient had split night PSG in Jan 2024 which showed THIERRY but no CPAP started yet.    SLEEP STUDY HISTORY (personally reviewed raw data such as interpretation report, data sheet, hypnogram, and titration table if available and applicable)  Split night PSG 1/16/2024: pre-PAP RDI3% 57.9 (supine RDI3% 111.5, non-supine RDI3% 7.3), RDI4% 19.9 (supine RDI4% 39.5, non-supine RDI4% 1.5), SpO2 jero 85%    SLEEP-WAKE SCHEDULE  Bedtime:  8 PM daily  Subjective sleep latency: 10-15 minutes  Difficulty falling asleep: No  Number of awakenings:  wakes up every 1 hour due to snoring or go to bathroom  Nocturia: Yes  Falls back asleep right away, sometimes 20 minutes  Final wake time:  6 AM daily  Out of bed time:  6 AM daily  Shift work:  No  Naps: once daily for 1 hour  Feels rested after a nap: No  Average sleep duration (excluding naps): 4 hours     SLEEP ENVIRONMENT  Sleep location: bed  Sleep status: sleeps with   Preferred sleep position: side  TV in bedroom: yes but not turned on  Room is dark: Yes  Room is quiet: Yes  Room is cool: Yes    SLEEP HABITS  Smokin/2 pack cigarettes daily for 35 years  ETOH: 2-3 glasses vodhka 5 times per year  Marijuana: no  Caffeine: 1 diet soda daily in the morning  Sleep aids: yes on melatonin    SLEEP ROS  Night symptoms: POSITIVE for snoring, witnessed apnea, wake up gasping and/or choking for air, night sweats during sleep, and nocturia and NEGATIVE for nasal congestion , mouth breathing, waking up with racing heart, heartburn or sour taste in mouth at night, and nocturnal cough  Morning symptoms: POSITIVE for unrefreshing sleep and morning dry mouth and NEGATIVE for morning headache and morning sore throat  Daytime symptoms POSITIVE for excessive daytime sleepiness, fatigue, trouble remembering things in daytime, trouble staying focused in daytime, irritability in daytime, and drowsy driving and NEGATIVE for history of car accident due to drowsy driving and history of near-miss car accident due to drowsy driving  Hypersomnia / narcolepsy symptoms: Patient denies symptoms of a hypersomnolence disorder such as sleep paralysis, sleep-related hallucinations, recurrent sleep attacks, automatic behaviors, and cataplexy.   Parasomnia symptoms: POSITIVE for sleep walking on Ambien  Movements in sleep: POSITIVE for frequent leg kicks / jerks at night while asleep    RLS screen: Patient admits having urge to move legs that occurs at rest (sitting, getting into bed, or lying n bed), worse in the evening, and relieved temporarily with movement.   Frequency of RLS symptoms: every night  RLS symptoms occurring in daytime: Yes   RLS symptoms progressing to arms: No   RLS symptoms affect sleep onset: Yes   RLS  symptoms wakes patient up from sleep: No   Precluding events of RLS symptoms: pregnancy 15 years ago  Current or past treatment for RLS: Magnesium    WEIGHT: gained 35 lbs in 1.5 years     Claustrophobia: Yes    REVIEW OF SYSTEMS     All other systems have been reviewed and are negative.    ALLERGIES     Allergies   Allergen Reactions    Penicillins Unknown     able to take amoxicillin       MEDICATIONS     Current Outpatient Medications   Medication Sig Dispense Refill    busPIRone (Buspar) 10 mg tablet Take 1 tablet (10 mg) by mouth 2 times a day. 180 tablet 1    gabapentin (Neurontin) 100 mg capsule Take 1 capsule (100 mg) by mouth 3 times a day. 270 capsule 1    MAGNESIUM GLYCINATE ORAL Take 2 capsules by mouth once daily at bedtime.      melatonin 10 mg tablet,chewable Chew.      omeprazole (PriLOSEC) 40 mg DR capsule Take 1 capsule (40 mg) by mouth once daily. 90 capsule 1    sertraline (Zoloft) 100 mg tablet Take 1 tablet (100 mg) by mouth once daily. 90 tablet 1    traZODone (Desyrel) 100 mg tablet Take 1 tablet (100 mg) by mouth once daily at bedtime. 90 tablet 1     No current facility-administered medications for this visit.       PAST HISTORIES     PERTINENT PAST MEDICAL HISTORY: See HPI    PERTINENT PAST SURGICAL HISTORY for Sleep Medicine:  tonsillectomy and adenoidectomy    PERTINENT FAMILY HISTORY for Sleep Medicine:  sleep apnea on PAP- daughter  RLS- daughter    PERTINENT SOCIAL HISTORY:  She  reports that she has been smoking cigarettes. She started smoking about 40 years ago. She has a 40.4 pack-year smoking history. She has never used smokeless tobacco. She reports current alcohol use. She reports that she does not currently use drugs after having used the following drugs: Marijuana. She currently lives with spouse and retired from work. Previous occupation was     Active Problems, Allergy List, Medication List, and PMH/PSH/FH/Social Hx have been reviewed and reconciled in chart.  "No significant changes unless documented in the pertinent chart section. Updates made when necessary.     PHYSICAL EXAM     VITAL SIGNS: There were no vitals taken for this visit.    NECK CIRCUMFERENCE: not measured    ESS: 15  CARYL: 24    Physical Exam  Constitutional: Awake, not in distress  Psych: alert and oriented to time, place, and person    RESULTS/DATA     No results found for: \"IRON\", \"TRANSFERRIN\", \"IRONSAT\", \"TIBC\", \"FERRITIN\"    Bicarbonate   Date Value Ref Range Status   04/04/2024 28 21 - 32 mmol/L Final       ASSESSMENT/PLAN     Gisselle Avalos is a 53 y.o. female who is seen today in [facility] for the following problems:.     OBSTRUCTIVE SLEEP APNEA, MODERATE positional (pre-PAP RDI3% 57.9, RDI4% 19.9 )  - Personally reviewed the sleep study's raw data such as interpretation report, data sheet, and hypnogram. A copy of recent testing was either given to patient or released in [facility]. See HPI for detailed summary of sleep study results.  - Discussed sleep study results and treatment options with patient.  - Recommend starting auto-CPAP 7-12 cm H2O with EPR off, heated humidification, heated tubings, and mask used in sleep lab (F&P Simplus FFM). Orders sent to [facility] / [facility] ?.  - Discussed 30-day mask guarantee and insurance requirement regarding PAP compliance and follow-up.   - Advised about cleaning instructions and replacement schedule of PAP accessories.  - Discussed sleep apnea in detail to include pathophysiology, risk factors, diagnostic options, treatment options, and cardiovascular / neurologic consequences of untreated THIERRY.   - Supportive management as follows: Lose weight. Stay off your back when sleeping. Avoid smoking, alcohol, and sedating medications if applicable. Don't drive when sleepy.    RESTLESS LEG SYNDROME  - Check ferritin, TSAT, and vitamin D. Replace if ferritin<75.  - If above tests are normal, start Ropinirole. Side effects of " iron and Ropinirole were discussed.   - Also discussed that antidepressants and melatonin can trigger or worsen RLS. Recommend stopping melatonin an sertraline if feasible.   - Supportive management: Avoid triggers of RLS such as caffeine, alcohol, nicotine, medications, and low iron. Taper off or reduce dose of medications that can cause RLS such as but not limited ot antidepressants except Buproprion and Trazodone, 1st generation antihistamines, antipsychotics, anti-emetics except ondansetron, melatonin, topiramate, etc. Consider switch antidepressants to Bupropion if feasible. May take warm bath 2 hours before bedtime. Massage and/or stretch legs while in bed    ALLERGIC RHINITIS   - Start fluticasone nasal spray 2 sprays per nostril once daily 1 hour before bedtime.  - If there is inadequate or lack of improvement on the above intranasal steroid spray, consider adding Azelastine nasal spray, 2 sprays per nostril once daily in the morning.   - Alternatively, may add cetirizine or loratadine 10 mg once daily.   - Educated patient on proper use of intranasal sprays.     OBESITY   - Recommend weight loss with diet and exercise.  - Weight loss can help in long term management of THIERRY.  - Defer management to PCP.      All of patient's questions were answered. She verbalizes understanding and agreement with my assessment and plan. Refer to after-visit-summary (AVS) for patient education and more details on sleep study preparation, troubleshooting issues with PAP usage, proper cleaning instructions of PAP supplies, and usual recommended replacement schedule for each of the PAP supplies.     Follow-up in 31-90 days after getting new machine.

## 2024-05-15 NOTE — PATIENT INSTRUCTIONS
"Thank you for coming to the Sleep Medicine Clinic today! Your sleep medicine provider today was: Deidre Knutson MD Below is a summary of your treatment plan, patient education, other important information, and our contact numbers.      TREATMENT PLAN     - Start auto-CPAP. Order placed and sent to [facility] [employer].  - Obtain iron studies (need at least 8 hours fasting). If ferritin is less than 75, we will need to start iron supplementation. Otherwise, we will consider starting other medication for RLS.   - For allergic rhinitis or post nasal drip, consider trying Fluticasone nasal spray and apply 2 sprays per nostril once daily 1 hour before bedtime. Make sure to point the spray tip sideways toward your ears. Then remove spray and pinch the nose for 10 seconds. If Fluticasone nasal spray did not work, may add azelastine nasal spray and apply 2 sprays per nostril once daily in the morning. May also try saline rinse (i.e. Netipot)  - Please read the \"Patient Education\" section below for more detailed information. Try implementing tips, reminders, strategies, and supportive management.   - If not yet done, please sign up for [employer] to make a future schedule, send prescription requests, or send messages.    Follow-up Appointment:   Follow-up in 31-90 days after getting new machine.    PATIENT EDUCATION     OBSTRUCTIVE SLEEP APNEA (THIRERY) is a sleep disorder where your upper airway muscles relax during sleep and the airway intermittently and repetitively narrows and collapses leading to partially blocked airway (hypopnea) or completely blocked airway (apnea) which, in turn, can disrupt breathing in sleep, lower oxygen levels while you sleep and cause night time wakings. Because both apnea and hypopnea may cause higher carbon dioxide or low oxygen levels, untreated THIERRY can lead to heart arrhythmia, elevation of blood pressure, and make it harder for the body to consolidate memory and facilitate metabolism (leading " to higher blood sugars at night). Frequent partial arousals occur during sleep resulting in sleep deprivation and daytime sleepiness. THIERRY is associated with an increased risk of cardiovascular disease, stroke, hypertension, and insulin resistance. Moreover, untreated THIERRY with excessive daytime sleepiness can increase the risk of motor vehicular accidents.    Below are conservative strategies for THIERRY regardless of THIERRY severity are:   Positional therapy - Avoid sleeping on your back.   Healthy diet and regular exercise to optimize weight is highly encouraged.   Avoid alcohol late in the evening and sedative-hypnotics as these substances can make sleep apnea worse.   Improve breathing through the nose with intranasal steroid spray, saline rinse, or antihistamines    Safety: Avoid driving vehicle and operating heavy equipment while sleepy. Drowsy driving may lead to life-threatening motor vehicle accidents. A person driving while sleepy is 5 times more likely to have an accident. If you feel sleepy, pull over and take a short power nap (sleep for less than 30 minutes). Otherwise, ask somebody to drive you.    Treatment options for sleep apnea include weight management, positional therapy, Positive Airway Therapy (PAP) therapy, oral appliance therapy, hypoglossal nerve stimulator (Inspire) and select airway surgeries.    Starting Positive Airway Pressure (PAP): You were ordered a device to wear when you sleep called PAP (Positive Airway Pressure) to treat your sleep apnea. The order will be submitted to a durable medical equipment (DME) company who will arrange setting you up with the device. They will provide all the necessary equipment and discuss use and maintenance of the device with you as well as mask fitting and process of replacing / renewing PAP supplies or accessories. Once you get the machine, please start using it immediately. You may not be successful right away and that is okay. [place] be certain that you  keep trying nightly and reach out to DME if you are struggling or having issues with machine usage.     *Please follow-up with me in 1-2 months of starting CPAP to see how well it is working for you and to do some troubleshooting if needed. Also, please bring all PAP equipment with you to follow up appointments unless told otherwise.     Important things to keep in mind as you start PAP:  Insurance will monitor your usage during the first 90 days. You should use your PAP - all night, every night, and including all naps (especially if naps are more than 30 minutes) for your health. The bare minimum is to use your PAP device while sleeping for at least 4 hours per day at least 5-6 days per week.. Otherwise, your PAP device will be reclaimed by your DME company at 90 days.  There are many masks to choose from to wear with your PAP machine. If you are not comfortable with the first mask issued to you, call your DME company and ask for another option to try. You typically have a 30-day mask guarantee from the day you received your machine.   Discuss with your provider if you are having issues breathing with the machine or if the temperature or humidity feel uncomfortable.  Expect to have an adjustment period when you start your device. It helps to continuing wearing the machine every day for a period of time until you get more used to it. You can practice with wearing the mask alone if you need, then add in the PAP air pressure a few days later.   Reach out for help if you are struggling! The sleep medicine department can be reached at [phone]  We encourage you to download data monitoring apps to your phone. For [employer] 10/11 - MyAir cole. For [employer] - DreamMapper. Both apps are available in the [employer] store for free and are a great tool to monitor your progress with your PAP device night to night.    Tips for success with PAP machine usage:  Comfortable and well-fitting mask  Appropriate  pressure on the machine  Using humidification  Support from bed partner and clinical team      Maintaining your CPAP/BPAP device:    The humidification chamber (aka water tank or water chamber) needs to be filled with distilled water to prevent buildup of white deposits in the future. If you cannot find distilled water, you can use tap water but expect to have white deposits buildup seen after prolonged use with tap water. If you start seeing white deposits on the water chamber, you can clean it by filling it with equal parts of distilled white vinegar and water. Let the vinegar-water mixture sit for 2 hours, and then rinse it with running tap water. Clean with soap and water then let it dry.     You should try to keep your machine clean in order to work well. Here are some tips to clean PAP supplies / accessories:    Clean the humidification chamber (aka water tank) as well as your mask and tubing at least once a week with soap and water.   Alternatively, you can fill a sink or basin with warm water and add a little mild detergent, like Ivory dish soap. Gently wipe your supplies with the soapy water to free all the oils and dirt that may have collected. Once that's done, rinse these items with clean water until the soap is gone and let them air dry. You can hang your tubing over the curtain pineda in your bathroom so that it dries.  The mask insert (part of the mask that has contact with your skin) needs to be cleaned with soap and water daily. Another option is to wipe them down with CPAP wipes or baby wipes.    You should replace your mask and tubing frequently in order to prevent bacteria buildup, machine damage, and mask seal issues. The older the mask and hose, the high likelihood that there is bacteria buildup in it especially if they are not cleaned regularly. Dirty filters damage machines because build-up of dust and contaminants can cause machine to over-heat, and in time, damage the motor of machine.  Cushions lose their seal over time as most masks are made of plastic and silicone while headgear is made of neoprene. These materials will break down with age and frequent use. Here is the recommended replacement schedule for PAP supplies / accessories:    Twice a month- disposable filters and cushions for nasal mask or nasal pillows.  Once a month- cushion for full face mask  Every 3 months- mask with headgear and PAP tubing (standard or heated hose)  Every 6 months- reusable filter, water chamber, and chin strap     Other useful information:    Some people do not put water in the tank while other people prefers to put water in the tank to prevent mouth dryness. Try to experiment to determine which is more comfortable for you.   In general, new machines have 2 years warranty on parts while health insurance allows you to have a new machine once every 5 years.     Common issues with PAP machine:    Mask gets dislodged when turning to the side: Consider getting a CPAP pillow or switching to a mask with hose on top.     Dry mouth:  Your machine has built-in humidifier that heats up the air to prevent dry mouth. It can be adjusted to your comfort. You can try that first and increase setting one level one night at a time to check which setting is comfortable and effective in lessening dry mouth. In some patients with heated hose, adjusting tube temperature to make air warmer can improve dry mouth. If dry mouth persists despite adjusting humidity or tube temperature setting, may apply OTC Biotene gel over the gums at bedtime.  If Biotene gel is not effective, consider trying XEROSTOM gel from [employer].  Also, eliminate or reduce dose of medications that can cause dry mouth if possible. Lastly, may try getting a separate room humidifier machine.    Airleaks: Please call DME as they may need to adjust your mask or refit you with a different kind or different size of mask. In addition, you can ask DME for tips on getting a  "good mask seal and mask fit.     Difficulty tolerating the mask: Contact your DME to try a different kind of mask and/or call office to get a referral to Sleep Psychologist for CPAP desensitization. CPAP desensitization technique is a set of strategies that helps patient cope with claustrophobia and anxiety related to wearing mask. Alternatively, we can do a daytime mini-sleep study called PAP-nap trial wherein you will try on different kinds of mask and the sleep technician will try different pressure settings on CPAP and BPAP machines to see which specific pressure is tolerable and comfortable for you.     Water droplets or moisture within the hose and/or mask: This is called rain-out and it is caused by condensation of too much heated humidity on the cooler walls of the hose. If you have rain-out, turn down humidity settings or get a heated hose. If you already have a heated hose, turn up the \"tube temperature\" of the heated hose. Alternatively, if you don't want to get a heated hose or warmer air, may wrap the CPAP hose with stockings to keep it somewhat warm. Also, you need to place the machine on the floor and lower the hose so that water won't travel upward towards your mask.     PAP desensitization techniques: If you have concerns about something being on your face at night, you can start by getting used to it before trying to sleep with it as follows:      Sit in a comfortable chair or bed. Connect the mask and hose to the CPAP/BPAP machine. Hold the mask on your face (without straps on) and turn on the machine. Practice breathing with the mask on while awake sitting and watching television, reading, or performing a sedentary activity during the day for 5-10 minutes and then take it off.  If tolerated, try again and gradually build up to longer periods of time. If not tolerated, try and try again until it is more comfortable as you become more desensitized. If you are able to use it for at least 20-30 " minutes, move unto the next step.     Sit in a comfortable chair or bed. Connect the mask and hose to the CPAP/BPAP machine. Strap the mask on your head and turn on the machine. Practice breathing with the mask and headgear on while awake sitting and watching television, reading, or performing a sedentary activity. Start with 5-10 minutes and gradually increasing time until you can wear it comfortably for at least 20-30 minutes, then move to the next step.    Take a shorter daytime nap with machine turned on while you are in a reclined position in bed, sofa, or recliner. Start with 5-10 minute nap and gradually increase up to 30 minutes. It is not important whether you fall asleep or not. The goal is to rest comfortably with PAP machine on.     Reintroduce PAP machine into nighttime sleep. You can begin using it a portion of the night and gradually increase up to entire night.     Proceed from one step to the next only when you are completely comfortable. If you feel any anxiety or discomfort, return to the previous step, then proceed again when comfortable.    Expect to “work” with your CPAP/BIPAP unit. It is important to try to relax when beginning CPAP/BIPAP therapy. Inhalation and exhalation should occur through the nose only. If you are unable to consistently breathe this way, do not panic or lose hope. There are other types of masks which allow you to breathe through your nose and/or your mouth. Also, in some patients, using intranasal steroid spray (e.g. Flonase or Nasocort or Fluticasone) 1 hour before bedtime and/or before putting on CPAP mask can help tolerate breathing through the mask.    Don't give up after a few attempts--some patients adjust quickly, while some patients need 3-4 weeks (or sometimes even longer) to be accustomed to CPAP therapy.  Contact your sleep medicine specialist if you have a significant change in weight since this may affect your pressure.    Restless Legs Syndrome (RLS) is a  clinical diagnosis wherein you have the urge to move your legs while sitting or lying down, occurring usually at night or worse at night, and is partially or completely relieved with movement (massage, stretching, walking etc). RLS is usually related with low iron in the brain and gets worse with caffeine, nicotine, alcohol, and certain medications (antidepressants, antipsychotics, sedating antihistamines, metoclopromide, melatonin, coQ10, topiramate, some antihypertensives, some antibiotics, albuterol, lipitor, levothyroxine)    General care for RLS:  If you are taking medications that triggers or worsens RLS, consider consulting with sleep specialist and/or prescribing doctor about tapering off these medications or switching to a different class of medications if feasible.  Avoid nicotine, alcohol, and caffeine containing substances, such as chocolate and sodas, as these can make symptoms worse.   Try exercise (ie walking) and warm baths 2 hours before bedtime. May also try stretching the legs against the wall prior going to bed, massaging legs while in bed, or doing leg compression.   If RLS symptoms occur 2 to 3 times per week, consider checking lab work to see if certain vitamins or minerals are depleted (i.e. iron levels) and/or start medication for RLS (i.e. ropinirole or pramipexole vs gabapentin).   If you need to take iron pill for RLS:  Take you iron pill on empty stomach either 30-60 minutes before meal or 2 hours after meal.   Take your iron pill with 100-200 mg vitamin C or 1 glass  of orange juice for better absorption.   Do not take your iron pill with milk or dairy products nor with levothyroxine. As much as possible, maintain 2-3 hours difference between milk or levothyroxine intake and iron pill intake.   If iron pill not tolerated, consider iron infusion.    You can also go to the following EDUCATION WEBSITES for further information:   American Academy of Sleep Medicine  http://sleepeducation.org  National Sleep Foundation: https://sleepfoundation.org  American Sleep Apnea Association: https://www.sleepapnea.org (for patients with sleep apnea)  Narcolepsy Network: https://www.narcolepsynetwork.org (for patients with narcolepsy)  [employer]: https://www.PlaceFulllepsy.org (for patients with narcolepsy)  Hypersomnia Foundation: https://www.hypersomniafoundation.org (for patients with idiopathic hypersomnia)  RLS foundation: https://www.rls.org (for patients with restless leg syndrome)    IMPORTANT INFORMATION     Call 911 for medical emergencies.  Our offices are generally open from Monday-Friday, 8 am - 5 pm.   There are no supporting services by either the sleep doctors or their staff on weekends and Holidays, or after 5 PM on weekdays.   If you need to get in touch with me, you may either call my office number or you can use [employer].  If a referral for a test, for CPAP, or for another specialist was made, and you have not heard about scheduling this within a week, please call scheduling at [phone]).  If you are unable to make your appointment for clinic or an overnight study, kindly call the office or sleep testing center at least 48 hours in advance to cancel and reschedule.  If you are on CPAP, please bring your device's card and/or the device to each clinic appointment.   In case of problems with PAP machine or mask interface, please contact your [employer] (Durable Medical Equipment) company first. [employer] is the company who provides you the machine and/or PAP supplies.       PRESCRIPTIONS     We require 7 days advanced notice for prescription refills. If we do not receive the request in this time, we cannot guarantee that your medication will be refilled in time.    IMPORTANT PHONE NUMBERS     Sleep Medicine Clinic Fax: [phone]  Appointments (for Pediatric Sleep Clinic): [phone] - option 1  Appointments (for Adult Sleep Clinic): [phone]  - option 2  Appointments (For Sleep Studies): [phone] - option 3  Behavioral Sleep Medicine: [phone]  Sleep Surgery: [phone]  Nutrition Service: [phone]  Weight management clinics with endocrinology: [phone]  Bariatric Services: [phone] (includes weight loss medications and weight loss surgery)  [facility]: [phone] (offers holistic approaches to weight management)  ENT (Otolaryngology): [phone]  Headache Clinic (Neurology): [phone]  Neurology: [phone]  Psychiatry: [phone]  Pulmonary Function Testing (PFT) Center: [phone]  Pulmonary Medicine: [phone]  [facility] ([facility]): [phone]      OUR SLEEP TESTING LOCATIONS     Our team will contact you to schedule your sleep study, however, you can contact us as follow:  Main Phone Line (scheduling only): [phone]), option 3  Adult and Pediatric Locations  [facility] (6 years and older): [facility] - 4th floor ([facility]) After hours line: [phone]  [facility] (Main campus: All ages): [facility], 6th floor. After hours line: [phone]   [place] (5 years and older; younger considered on case-by-case basis): [address]; [address], [address]. Scheduling  After hours line: [phone]   [place] (6 years and older): [address]; [address]; [address]   [place] (6 years and older): [address] [facility], [address]  After hours line: [phone]   [place] (13 years and older) in [place]: [address], 2nd floor  After hours line: [phone]   [place] (13 year and older): [address]  After hours line: [phone]     Adult Only Locations:   [place] (18 years and older): [address] [facility], 2nd floor   [place] (18 years and older): [address]; 4th floor  After hours line: [phone]  [facility]  "(18 years and older) at [place]: [address] [facility]  After hours line: [phone]     CONTACTING YOUR SLEEP MEDICINE PROVIDER AND SLEEP TEAM      For issues with your machine or mask interface, please call your DME provider first. [email or web address] stands for durable medical company. [employer] is the company who provides you the machine and/or PAP supplies / accessories.   To schedule, cancel, or reschedule SLEEP STUDY APPOINTMENTS, please call the Main Phone Line at [phone] - option 3.   To schedule, cancel, or reschedule CLINIC APPOINTMENTS, you can do it in \"[facility] DChart\", call [phone] (direct line) to speak with my practice lead (Gloria Fontana), or call the Main Phone Line at [phone] - option 2  For CLINICAL QUESTIONS or MEDICATION REFILLS, please call direct line for Adult Sleep Nurses at [phone].   Lastly, you can also send a message directly to your provider through \"My Chart\", which is the email service through your  Records Account: [email or web address]       Here at [facility], we wish you a restful sleep!    "

## 2024-05-20 ENCOUNTER — APPOINTMENT (OUTPATIENT)
Dept: RADIOLOGY | Facility: HOSPITAL | Age: 54
End: 2024-05-20
Payer: MEDICARE

## 2024-05-20 ENCOUNTER — HOSPITAL ENCOUNTER (EMERGENCY)
Facility: HOSPITAL | Age: 54
Discharge: HOME | End: 2024-05-20
Payer: MEDICARE

## 2024-05-20 VITALS
OXYGEN SATURATION: 100 % | TEMPERATURE: 97.7 F | HEART RATE: 80 BPM | HEIGHT: 69 IN | BODY MASS INDEX: 32.58 KG/M2 | WEIGHT: 220 LBS | SYSTOLIC BLOOD PRESSURE: 114 MMHG | DIASTOLIC BLOOD PRESSURE: 78 MMHG | RESPIRATION RATE: 18 BRPM

## 2024-05-20 DIAGNOSIS — S06.0X0A CONCUSSION WITHOUT LOSS OF CONSCIOUSNESS, INITIAL ENCOUNTER: ICD-10-CM
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
## 2024-05-20 DIAGNOSIS — S13.4XXA WHIPLASH INJURY TO NECK, INITIAL ENCOUNTER: ICD-10-CM

## 2024-05-20 PROCEDURE — 72131 CT LUMBAR SPINE W/O DYE: CPT

## 2024-05-20 PROCEDURE — 70450 CT HEAD/BRAIN W/O DYE: CPT | Performed by: RADIOLOGY

## 2024-05-20 PROCEDURE — 2500000005 HC RX 250 GENERAL PHARMACY W/O HCPCS: Performed by: PHYSICIAN ASSISTANT

## 2024-05-20 PROCEDURE — 70450 CT HEAD/BRAIN W/O DYE: CPT

## 2024-05-20 PROCEDURE — 2500000004 HC RX 250 GENERAL PHARMACY W/ HCPCS (ALT 636 FOR OP/ED): Performed by: PHYSICIAN ASSISTANT

## 2024-05-20 PROCEDURE — 72125 CT NECK SPINE W/O DYE: CPT | Performed by: RADIOLOGY

## 2024-05-20 PROCEDURE — 99285 EMERGENCY DEPT VISIT HI MDM: CPT | Mod: 25

## 2024-05-20 PROCEDURE — 72128 CT CHEST SPINE W/O DYE: CPT | Mod: FOREIGN READ | Performed by: RADIOLOGY

## 2024-05-20 PROCEDURE — 96372 THER/PROPH/DIAG INJ SC/IM: CPT | Performed by: PHYSICIAN ASSISTANT

## 2024-05-20 PROCEDURE — 72131 CT LUMBAR SPINE W/O DYE: CPT | Mod: FOREIGN READ | Performed by: RADIOLOGY

## 2024-05-20 PROCEDURE — 72128 CT CHEST SPINE W/O DYE: CPT

## 2024-05-20 PROCEDURE — 72125 CT NECK SPINE W/O DYE: CPT

## 2024-05-20 RX ORDER — ONDANSETRON 4 MG/1
4 TABLET, ORALLY DISINTEGRATING ORAL ONCE
Status: COMPLETED | OUTPATIENT
Start: 2024-05-20 | End: 2024-05-20

## 2024-05-20 RX ORDER — ACETAMINOPHEN 325 MG/1
650 TABLET ORAL ONCE
Status: COMPLETED | OUTPATIENT
Start: 2024-05-20 | End: 2024-05-20

## 2024-05-20 RX ORDER — METHOCARBAMOL 500 MG/1
500 TABLET, FILM COATED ORAL 4 TIMES DAILY PRN
Qty: 20 TABLET | Refills: 0 | Status: SHIPPED | OUTPATIENT
Start: 2024-05-20 | End: 2024-05-28 | Stop reason: SDUPTHER

## 2024-05-20 RX ORDER — ORPHENADRINE CITRATE 30 MG/ML
60 INJECTION INTRAMUSCULAR; INTRAVENOUS ONCE
Status: COMPLETED | OUTPATIENT
Start: 2024-05-20 | End: 2024-05-20

## 2024-05-20 RX ORDER — ONDANSETRON 4 MG/1
4 TABLET, ORALLY DISINTEGRATING ORAL EVERY 8 HOURS PRN
Qty: 15 TABLET | Refills: 0 | Status: SHIPPED | OUTPATIENT
Start: 2024-05-20 | End: 2024-05-28 | Stop reason: SDUPTHER

## 2024-05-20 RX ADMIN — ORPHENADRINE CITRATE 60 MG: 60 INJECTION INTRAMUSCULAR; INTRAVENOUS at 10:11

## 2024-05-20 RX ADMIN — ONDANSETRON 4 MG: 4 TABLET, ORALLY DISINTEGRATING ORAL at 10:10

## 2024-05-20 RX ADMIN — ONDANSETRON 4 MG: 4 TABLET, ORALLY DISINTEGRATING ORAL at 11:23

## 2024-05-20 RX ADMIN — ACETAMINOPHEN 650 MG: 325 TABLET ORAL at 10:10

## 2024-05-20 ASSESSMENT — PAIN - FUNCTIONAL ASSESSMENT: PAIN_FUNCTIONAL_ASSESSMENT: 0-10

## 2024-05-20 ASSESSMENT — LIFESTYLE VARIABLES
EVER FELT BAD OR GUILTY ABOUT YOUR DRINKING: NO
HAVE PEOPLE ANNOYED YOU BY CRITICIZING YOUR DRINKING: NO
EVER HAD A DRINK FIRST THING IN THE MORNING TO STEADY YOUR NERVES TO GET RID OF A HANGOVER: NO
HAVE YOU EVER FELT YOU SHOULD CUT DOWN ON YOUR DRINKING: NO
TOTAL SCORE: 0

## 2024-05-20 ASSESSMENT — COLUMBIA-SUICIDE SEVERITY RATING SCALE - C-SSRS
1. IN THE PAST MONTH, HAVE YOU WISHED YOU WERE DEAD OR WISHED YOU COULD GO TO SLEEP AND NOT WAKE UP?: NO
6. HAVE YOU EVER DONE ANYTHING, STARTED TO DO ANYTHING, OR PREPARED TO DO ANYTHING TO END YOUR LIFE?: NO
2. HAVE YOU ACTUALLY HAD ANY THOUGHTS OF KILLING YOURSELF?: NO

## 2024-05-20 ASSESSMENT — PAIN DESCRIPTION - LOCATION: LOCATION: BACK

## 2024-05-20 ASSESSMENT — PAIN SCALES - GENERAL: PAINLEVEL_OUTOF10: 6
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
## 2024-05-28 ENCOUNTER — OFFICE VISIT (OUTPATIENT)
Dept: PRIMARY CARE | Facility: CLINIC | Age: 54
End: 2024-05-28
Payer: MEDICARE

## 2024-05-28 VITALS
BODY MASS INDEX: 33.66 KG/M2 | OXYGEN SATURATION: 97 % | WEIGHT: 227.25 LBS | SYSTOLIC BLOOD PRESSURE: 123 MMHG | HEIGHT: 69 IN | HEART RATE: 73 BPM | DIASTOLIC BLOOD PRESSURE: 71 MMHG

## 2024-05-28 DIAGNOSIS — S06.0XAA CONCUSSION WITH UNKNOWN LOSS OF CONSCIOUSNESS STATUS, INITIAL ENCOUNTER: ICD-10-CM

## 2024-05-28 DIAGNOSIS — S13.4XXA WHIPLASH INJURY TO NECK, INITIAL ENCOUNTER: ICD-10-CM

## 2024-05-28 DIAGNOSIS — G47.00 INSOMNIA, UNSPECIFIED TYPE: ICD-10-CM

## 2024-05-28 DIAGNOSIS — E78.5 HYPERLIPIDEMIA, UNSPECIFIED HYPERLIPIDEMIA TYPE: ICD-10-CM
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
## 2024-05-28 DIAGNOSIS — R55 VASOVAGAL SYNCOPE: ICD-10-CM

## 2024-05-28 PROBLEM — S06.0X9A CONCUSSION WITH LOSS OF CONSCIOUSNESS: Status: ACTIVE | Noted: 2024-05-28

## 2024-05-28 PROCEDURE — 99214 OFFICE O/P EST MOD 30 MIN: CPT | Performed by: FAMILY MEDICINE

## 2024-05-28 RX ORDER — ONDANSETRON 4 MG/1
4 TABLET, ORALLY DISINTEGRATING ORAL EVERY 8 HOURS PRN
Qty: 30 TABLET | Refills: 2 | Status: SHIPPED | OUTPATIENT
Start: 2024-05-28

## 2024-05-28 RX ORDER — METHOCARBAMOL 500 MG/1
500 TABLET, FILM COATED ORAL 3 TIMES DAILY PRN
Qty: 30 TABLET | Refills: 2 | Status: SHIPPED | OUTPATIENT
Start: 2024-05-28

## 2024-05-28 RX ORDER — ATORVASTATIN CALCIUM 10 MG/1
10 TABLET, FILM COATED ORAL NIGHTLY
Qty: 100 TABLET | Refills: 1 | Status: SHIPPED | OUTPATIENT
Start: 2024-05-28

## 2024-05-28 RX ORDER — AMITRIPTYLINE HYDROCHLORIDE 25 MG/1
25 TABLET, FILM COATED ORAL NIGHTLY
Qty: 30 TABLET | Refills: 2 | Status: SHIPPED | OUTPATIENT
Start: 2024-05-28 | End: 2024-05-30

## 2024-05-28 ASSESSMENT — ENCOUNTER SYMPTOMS
OCCASIONAL FEELINGS OF UNSTEADINESS: 1
NAUSEA: 1
PHOTOPHOBIA: 1
DIZZINESS: 1
DEPRESSION: 0
LOSS OF SENSATION IN FEET: 0
HEADACHES: 1
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
## 2024-05-28 NOTE — PATIENT INSTRUCTIONS
"Rest.    Monitor your resting blood pressure (BP) and heart rate (HR) at home. Resting BP should be fairly consistently better than 140/90, preferably better than 130/80. Please bring your blood pressure cuff to your appointments.  For a list of validated BP cuffs: https://www.validatebp.org/    Highlights of possible Lipitor/atorvastatin side effects:  Diarrhea (7-14%), joint pain (9-12%), muscle pain (3-8%), stuffy nose or sore throat (13%), diabetes (6%), nausea (7%), UTI (7-8%), insomnia (5%), hemorrhagic stroke (2%), increased liver enzymes (2%), muscle injury, liver failure, interstitial morteza disease, serious allergic reaction.     Please follow-up with an eye doctor for an eye exam, due to the vision changes/\"spots.\"    Ophthalmology  Dr. Ravi Blanco, et al. [phone]  Dr. Mark England, Dr. Jaqui Canseco, et al. [phone]  Dr. Brian Ty [phone]  Dr. Benedict Finn, et al. [phone]    Please return for a(n) concussion, medication follow-up appointment in 2-4 weeks, earlier if any question or concern.    Avoid taking Biotin (a vitamin, shows up particularly in hair/nail supplements) for a week prior to any blood tests, as it can interfere with certain results. Fasting for labs means 12 hours, nothing to eat or drink, except water and medications, unless directed otherwise.    For assistance with scheduling referrals or consultations, please call [phone]. For laboratory, radiology, and other tests, please call [phone] ([phone] for pediatrics). Please review prescription inserts and published information for possible adverse effects of all medications. Return after testing or consultation to review results and recommendations, if symptoms persist, change, worsen, or return, or if you have any question or concern. If you do not get results within 7-10 days, or you have any question or concern, please send a message, call the office ([phone]), or return to the " office for a follow-up appointment. For non-emergencies, you may call the office. For emergencies, call 9-1-1 or go to the nearest Emergency Department. Please schedule additional appointment(s) to address concern(s) not addressed today.    In general, results are not released or discussed over the telephone, but at an appointment or via  [employer]. Results of tests done through [facility] are released via  [employer] (see below).  [email or web address]   [employer] support line: [phone]

## 2024-05-30 DIAGNOSIS — G47.00 INSOMNIA, UNSPECIFIED TYPE: ICD-10-CM

## 2024-05-30 RX ORDER — TRAZODONE HYDROCHLORIDE 100 MG/1
100 TABLET ORAL NIGHTLY
Qty: 90 TABLET | Refills: 1 | Status: SHIPPED | OUTPATIENT
Start: 2024-05-30

## 2024-06-20 ENCOUNTER — APPOINTMENT (OUTPATIENT)
Dept: PRIMARY CARE | Facility: CLINIC | Age: 54
End: 2024-06-20
Payer: MEDICARE

## 2024-06-20 VITALS
BODY MASS INDEX: 33.47 KG/M2 | WEIGHT: 226 LBS | HEIGHT: 69 IN | OXYGEN SATURATION: 95 % | SYSTOLIC BLOOD PRESSURE: 110 MMHG | HEART RATE: 76 BPM | DIASTOLIC BLOOD PRESSURE: 67 MMHG

## 2024-06-20 DIAGNOSIS — S06.0XAA CONCUSSION WITH UNKNOWN LOSS OF CONSCIOUSNESS STATUS, INITIAL ENCOUNTER: ICD-10-CM

## 2024-06-20 DIAGNOSIS — S13.4XXD WHIPLASH INJURY TO NECK, SUBSEQUENT ENCOUNTER: ICD-10-CM
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
## 2024-06-20 DIAGNOSIS — R42 DIZZINESS: ICD-10-CM

## 2024-06-20 DIAGNOSIS — S13.4XXA WHIPLASH INJURY TO NECK, INITIAL ENCOUNTER: ICD-10-CM

## 2024-06-20 DIAGNOSIS — Z82.49 FAMILY HISTORY OF EARLY CAD: ICD-10-CM

## 2024-06-20 DIAGNOSIS — E78.5 HYPERLIPIDEMIA, UNSPECIFIED HYPERLIPIDEMIA TYPE: ICD-10-CM
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
## 2024-06-20 PROCEDURE — 99214 OFFICE O/P EST MOD 30 MIN: CPT | Performed by: FAMILY MEDICINE

## 2024-06-20 RX ORDER — METHOCARBAMOL 500 MG/1
500 TABLET, FILM COATED ORAL 3 TIMES DAILY PRN
Qty: 30 TABLET | Refills: 2 | Status: SHIPPED | OUTPATIENT
Start: 2024-06-20

## 2024-06-20 ASSESSMENT — ENCOUNTER SYMPTOMS
NAUSEA: 1
BACK PAIN: 1
HEADACHES: 1
DIZZINESS: 1
NECK PAIN: 1

## 2024-06-20 NOTE — PATIENT INSTRUCTIONS
"The CT coronary artery calcium (CAC) score is calculated from a CT scan of the heart done without contrast and synchronized with the heartbeat to quantify calcifications in the coronary arteries. The CAC score can help to estimate the risk of heart attack, and can help guide therapy decisions (e.g., whether or not to start a statin, see a cardiologist, etc.). The CT will miss significant lesions about 5% of the time. False positives are rare, but can happen, and could lead to additional \"unnecessary\" testing, procedures, referrals, anxiety, etc. CT CAC scoring can not identify narrowing of the arteries. There is a possibility of identifying things that may have otherwise gone unnoticed, which may be a good thing, but which may instead lead to \"unnecessary\" testing, procedures, referrals, anxiety, etc. The CAC score is generally not repeated more frequently than every 5 years.    Please return for a(n) concussion, neck pain follow-up appointment in about 6 weeks, earlier if any question or concern. Please schedule additional problem-focused appointment(s) to address additional problem(s).    Avoid taking Biotin (a vitamin, shows up particularly in hair/nail supplements) for a week prior to any blood tests, as it can interfere with certain results. Fasting for labs means 12 hours, nothing to eat or drink, except water and medications, unless directed otherwise.    For assistance with scheduling referrals or consultations, please call [phone]. For laboratory, radiology, and other tests, please call [phone] ([phone] for pediatrics). Please review prescription inserts and published information for possible adverse effects of all medications. Return after testing or consultation to review results and recommendations, if symptoms persist, change, worsen, or return, or if you have any question or concern. If you do not get results within 7-10 days, or you have any question or concern, please send a message, " call the office ([phone]), or return to the office for a follow-up appointment. For non-emergencies, you may call the office. For emergencies, call 9-1-1 or go to the nearest Emergency Department. Please schedule additional appointment(s) to address concern(s) not addressed today.    In general, results are not released or discussed over the telephone, but at an appointment or via  [employer]. Results of tests done through [facility] are released via  [employer] (see below).  [email or web address]   [employer] support line: [phone]          Ways to Help Prevent Falls at Home    Quick Tips   ? Ask for help if you need it. Most people want to help!   ? Get up slowly after sitting or laying down   ? Wear a medical alert device or keep cell phone in your pocket   ? Use night lights, especially areas near a bathroom   ? Keep the items you use often within reach on a small stool or end table   ? Use an assistive device such as walker or cane, as directed by provider/physical therapy   ? Use a non-slip mat and grab bars in your bathroom. Look for home health sections for best options     Other Areas to Focus On   ? Exercise and nutrition: Regular exercise or taking a falls prevention class are great ways improve strength and balance. Don’t forget to stay hydrated and bring a snack!   ? Medicine side effects: Some medicines can make you sleepy or dizzy, which could cause a fall. Ask your healthcare provider about the side effects your medicines could cause. Be sure to let them know if you take any vitamins or supplements as well.   ? Tripping hazards: Remove items you could trip on, such as loose mats, rugs, cords, and clutter. Wear closed toe shoes with rubber soles.   ? Health and wellness: Get regular checkups with your healthcare provider, plus routine vision and hearing screenings. Talk with your healthcare provider about:   o Your medicines and the possible side effects - bring them in a  bag if that is easier!   o Problems with balance or feeling dizzy   o Ways to promote bone health, such as Vitamin D and calcium supplements   o Questions or concerns about falling     *Ask your healthcare team if you have questions     ©[facility], 2022
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
## 2024-06-27 ENCOUNTER — HOSPITAL ENCOUNTER (EMERGENCY)
Facility: HOSPITAL | Age: 54
Discharge: HOME | End: 2024-06-27
Attending: STUDENT IN AN ORGANIZED HEALTH CARE EDUCATION/TRAINING PROGRAM
Payer: MEDICARE

## 2024-06-27 VITALS
HEIGHT: 69 IN | HEART RATE: 80 BPM | RESPIRATION RATE: 18 BRPM | TEMPERATURE: 97.9 F | WEIGHT: 230 LBS | SYSTOLIC BLOOD PRESSURE: 128 MMHG | OXYGEN SATURATION: 99 % | DIASTOLIC BLOOD PRESSURE: 64 MMHG | BODY MASS INDEX: 34.07 KG/M2

## 2024-06-27 DIAGNOSIS — N39.0 UTI (URINARY TRACT INFECTION) WITH PYURIA: Primary | ICD-10-CM

## 2024-06-27 LAB
APPEARANCE UR: CLEAR
BACTERIA #/AREA URNS AUTO: ABNORMAL /HPF
BILIRUB UR STRIP.AUTO-MCNC: NEGATIVE MG/DL
COLOR UR: ABNORMAL
GLUCOSE UR STRIP.AUTO-MCNC: NORMAL MG/DL
KETONES UR STRIP.AUTO-MCNC: NEGATIVE MG/DL
LEUKOCYTE ESTERASE UR QL STRIP.AUTO: ABNORMAL
MUCOUS THREADS #/AREA URNS AUTO: ABNORMAL /LPF
NITRITE UR QL STRIP.AUTO: NEGATIVE
PH UR STRIP.AUTO: 5.5 [PH]
PROT UR STRIP.AUTO-MCNC: NEGATIVE MG/DL
RBC # UR STRIP.AUTO: ABNORMAL /UL
RBC #/AREA URNS AUTO: ABNORMAL /HPF
SP GR UR STRIP.AUTO: 1.02
SQUAMOUS #/AREA URNS AUTO: ABNORMAL /HPF
UROBILINOGEN UR STRIP.AUTO-MCNC: NORMAL MG/DL
WBC #/AREA URNS AUTO: ABNORMAL /HPF

## 2024-06-27 PROCEDURE — 81001 URINALYSIS AUTO W/SCOPE: CPT | Performed by: STUDENT IN AN ORGANIZED HEALTH CARE EDUCATION/TRAINING PROGRAM

## 2024-06-27 PROCEDURE — 99283 EMERGENCY DEPT VISIT LOW MDM: CPT

## 2024-06-27 PROCEDURE — 2500000002 HC RX 250 W HCPCS SELF ADMINISTERED DRUGS (ALT 637 FOR MEDICARE OP, ALT 636 FOR OP/ED): Performed by: STUDENT IN AN ORGANIZED HEALTH CARE EDUCATION/TRAINING PROGRAM

## 2024-06-27 RX ORDER — SULFAMETHOXAZOLE AND TRIMETHOPRIM 800; 160 MG/1; MG/1
1 TABLET ORAL ONCE
Status: COMPLETED | OUTPATIENT
Start: 2024-06-27 | End: 2024-06-27

## 2024-06-27 RX ORDER — SULFAMETHOXAZOLE AND TRIMETHOPRIM 800; 160 MG/1; MG/1
1 TABLET ORAL 2 TIMES DAILY
Qty: 14 TABLET | Refills: 0 | Status: SHIPPED | OUTPATIENT
Start: 2024-06-27 | End: 2024-07-04

## 2024-06-27 RX ORDER — SULFAMETHOXAZOLE AND TRIMETHOPRIM 800; 160 MG/1; MG/1
1 TABLET ORAL 2 TIMES DAILY
Qty: 14 TABLET | Refills: 0 | Status: SHIPPED | OUTPATIENT
Start: 2024-06-27 | End: 2024-06-27

## 2024-06-27 RX ADMIN — SULFAMETHOXAZOLE AND TRIMETHOPRIM 1 TABLET: 800; 160 TABLET ORAL at 09:16

## 2024-06-27 ASSESSMENT — LIFESTYLE VARIABLES
TOTAL SCORE: 0
HAVE PEOPLE ANNOYED YOU BY CRITICIZING YOUR DRINKING: NO
EVER HAD A DRINK FIRST THING IN THE MORNING TO STEADY YOUR NERVES TO GET RID OF A HANGOVER: NO
EVER FELT BAD OR GUILTY ABOUT YOUR DRINKING: NO
HAVE YOU EVER FELT YOU SHOULD CUT DOWN ON YOUR DRINKING: NO

## 2024-06-27 ASSESSMENT — PAIN DESCRIPTION - DESCRIPTORS
DESCRIPTORS: BURNING
DESCRIPTORS: ACHING

## 2024-06-27 ASSESSMENT — PAIN DESCRIPTION - LOCATION
LOCATION: PELVIS
LOCATION: PELVIS

## 2024-06-27 ASSESSMENT — PAIN - FUNCTIONAL ASSESSMENT
PAIN_FUNCTIONAL_ASSESSMENT: 0-10
PAIN_FUNCTIONAL_ASSESSMENT: 0-10

## 2024-06-27 ASSESSMENT — PAIN DESCRIPTION - PAIN TYPE
TYPE: ACUTE PAIN
TYPE: ACUTE PAIN

## 2024-06-27 ASSESSMENT — PAIN SCALES - GENERAL
PAINLEVEL_OUTOF10: 5 - MODERATE PAIN
PAINLEVEL_OUTOF10: 5 - MODERATE PAIN

## 2024-06-27 NOTE — ED PROVIDER NOTES
HPI   Chief Complaint   Patient presents with    Urinary Frequency       HPI     Patient is a pleasant 54-year-old female presenting today in the setting of urinary frequency symptoms.  She states that she has gotten UTIs in the past and this feels very similar.  She states she is noticing burning with going and going more frequently.  She is taken over-the-counter Azo for 4 days now but still symptomatic.  States that she is required antibiotics in the past.  Her last urinary tract infection was over a year prior.  Denies any fevers.  Denies any abdominal pain, nausea or vomiting with symptoms.               [place] Coma Scale Score: 15                     Patient History   Past Medical History:   Diagnosis Date    Acute bronchitis 01/26/2024    Acute sinusitis 08/07/2008    Backache 06/18/2009    Contracture of muscle, left upper arm 09/02/2021    Contracture of muscle of left upper arm    Dog bite of lower leg 09/19/2022    Encounter for screening for malignant neoplasm of colon 09/02/2021    Colon cancer screening    Encounter for screening for malignant neoplasm of colon 09/02/2021    Colon cancer screening    Other specified abnormal findings of blood chemistry 06/23/2022    D-dimer, elevated    Pain in right knee 04/15/2022    Right knee pain    Person with feared health complaint in whom no diagnosis is made 07/22/2022    Concern about cardiovascular disease without diagnosis    Personal history of other medical treatment 09/02/2021    History of screening mammography    Urinary tract infection, site not specified 07/25/2022    Acute UTI     Past Surgical History:   Procedure Laterality Date    CHOLECYSTECTOMY      HYSTERECTOMY      OTHER SURGICAL HISTORY  09/02/2021    Gallbladder surgery    OTHER SURGICAL HISTORY  09/02/2021    Hysterectomy     Family History   Problem Relation Name Age of Onset    Breast cancer Mother Nettalexy bain     Arthritis Mother [facility]     Asthma Mother [facility]     Hearing  loss Father Richard bain     Cancer Father Richard bain         unknown cancer type    Coronary artery disease Father Richard bain 52    Brain cancer Sister Nguyen     Heart disease Sister Nguyen     Breast cancer Sister Kely     Hypertension Sister Kely     Prostate cancer Brother x3     Diabetes Brother x3     Sleep apnea Daughter      Restless legs syndrome Daughter       Social History     Tobacco Use    Smoking status: Every Day     Current packs/day: 1.00     Average packs/day: 1 pack/day for 40.5 years (40.5 ttl pk-yrs)     Types: Cigarettes     Start date: 1/1/1984    Smokeless tobacco: Never   Vaping Use    Vaping status: Never Used   Substance Use Topics    Alcohol use: Yes     Comment: 1a month    Drug use: Not Currently     Types: Marijuana     Comment: in the 80's       Physical Exam   ED Triage Vitals [06/27/24 0840]   Temperature Heart Rate Respirations BP   36.6 °C (97.9 °F) 88 18 125/58      Pulse Ox Temp Source Heart Rate Source Patient Position   99 % Temporal Monitor --      BP Location FiO2 (%)     -- --       Physical Exam  Vitals and nursing note reviewed.   Constitutional:       General: She is not in acute distress.     Appearance: She is well-developed.   HENT:      Head: Normocephalic and atraumatic.   Eyes:      Conjunctiva/sclera: Conjunctivae normal.   Cardiovascular:      Rate and Rhythm: Normal rate and regular rhythm.      Heart sounds: No murmur heard.  Pulmonary:      Effort: Pulmonary effort is normal. No respiratory distress.      Breath sounds: Normal breath sounds.   Abdominal:      General: Abdomen is flat. There is no distension.      Palpations: Abdomen is soft.      Tenderness: There is no abdominal tenderness.   Musculoskeletal:         General: No swelling.      Cervical back: Neck supple.   Skin:     General: Skin is warm and dry.      Capillary Refill: Capillary refill takes less than 2 seconds.   Neurological:      Mental Status: She is alert.   Psychiatric:          Mood and Affect: Mood normal.         ED Course & MDM   Diagnoses as of 06/27/24 0938   UTI (urinary tract infection) with pyuria       Medical Decision Making  Patient's a 54-year-old female presenting as above hemodynamically stable no acute distress on bedside assessment.  No abdominal tenderness on exam.  Hemodynamically stable.  Endorses symptoms of urinary frequency and pain with going.  Her urinalysis has leukocyte esterase, 2+ bacteria and pyuria.  Given first dose of Bactrim here and will be discharged home with a course sent to her pharmacy. Provided prescription for Bactrim, discussed antibiotics, course of treatment and expected timeline for symptom improvement.    Procedure  Procedures     Vianey Stokes MD  06/27/24 1791

## 2024-06-27 NOTE — ED TRIAGE NOTES
Patient c/o urinary frequency, burning sensation while urinating and low back pain for the past 5 days. Patient has been taking OTC meds and drinking cranberry juice but her symptoms have not resolved.

## 2024-07-03 ENCOUNTER — APPOINTMENT (OUTPATIENT)
Dept: PRIMARY CARE | Facility: CLINIC | Age: 54
End: 2024-07-03
Payer: MEDICARE

## 2024-07-11 ENCOUNTER — HOSPITAL ENCOUNTER (OUTPATIENT)
Dept: RADIOLOGY | Facility: HOSPITAL | Age: 54
Discharge: HOME | End: 2024-07-11
Payer: MEDICARE

## 2024-07-11 ENCOUNTER — APPOINTMENT (OUTPATIENT)
Dept: RADIOLOGY | Facility: HOSPITAL | Age: 54
End: 2024-07-11
Payer: MEDICARE

## 2024-07-11 ENCOUNTER — TELEPHONE (OUTPATIENT)
Dept: PRIMARY CARE | Facility: CLINIC | Age: 54
End: 2024-07-11
Payer: MEDICARE
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
## 2024-07-11 DIAGNOSIS — S06.0XAA CONCUSSION WITH UNKNOWN LOSS OF CONSCIOUSNESS STATUS, INITIAL ENCOUNTER: ICD-10-CM

## 2024-07-11 DIAGNOSIS — Z12.31 SCREENING MAMMOGRAM FOR BREAST CANCER: ICD-10-CM

## 2024-07-11 PROCEDURE — A9575 INJ GADOTERATE MEGLUMI 0.1ML: HCPCS | Performed by: FAMILY MEDICINE

## 2024-07-11 PROCEDURE — 70553 MRI BRAIN STEM W/O & W/DYE: CPT

## 2024-07-11 PROCEDURE — 2550000001 HC RX 255 CONTRASTS: Performed by: FAMILY MEDICINE

## 2024-07-11 RX ORDER — GADOTERATE MEGLUMINE 376.9 MG/ML
0.2 INJECTION INTRAVENOUS
Status: COMPLETED | OUTPATIENT
Start: 2024-07-11 | End: 2024-07-11

## 2024-07-11 NOTE — TELEPHONE ENCOUNTER
"----- Message from Alex Crowley sent at 7/11/2024  1:53 PM EDT -----  Please make sure patient is aware of the comments or MyChart message.    Radiologist reports \"no evidence of acute [stroke] or intracranial mass.\"  Radiologist does describe some nonspecific changes, which are of unclear significance, but are likely unrelated to your concussion.  Recommend follow-up with neuropsychology, as discussed.  "

## 2024-07-17 ENCOUNTER — APPOINTMENT (OUTPATIENT)
Dept: SLEEP MEDICINE | Facility: CLINIC | Age: 54
End: 2024-07-17
Payer: MEDICARE

## 2024-08-23 ENCOUNTER — APPOINTMENT (OUTPATIENT)
Dept: SLEEP MEDICINE | Facility: CLINIC | Age: 54
End: 2024-08-23
Payer: MEDICARE

## 2024-08-29 ENCOUNTER — APPOINTMENT (OUTPATIENT)
Dept: PRIMARY CARE | Facility: CLINIC | Age: 54
End: 2024-08-29
Payer: MEDICARE

## 2024-09-03 ENCOUNTER — APPOINTMENT (OUTPATIENT)
Dept: NEUROSURGERY | Facility: CLINIC | Age: 54
End: 2024-09-03
Payer: MEDICARE

## 2024-09-03 VITALS
DIASTOLIC BLOOD PRESSURE: 64 MMHG | RESPIRATION RATE: 16 BRPM | SYSTOLIC BLOOD PRESSURE: 128 MMHG | HEART RATE: 86 BPM | HEIGHT: 68 IN | BODY MASS INDEX: 32.74 KG/M2 | WEIGHT: 216.05 LBS
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
## 2024-09-03 DIAGNOSIS — S06.0XAA CONCUSSION WITH UNKNOWN LOSS OF CONSCIOUSNESS STATUS, INITIAL ENCOUNTER: ICD-10-CM

## 2024-09-03 PROCEDURE — 99202 OFFICE O/P NEW SF 15 MIN: CPT | Performed by: NEUROLOGICAL SURGERY

## 2024-09-03 ASSESSMENT — ENCOUNTER SYMPTOMS
OCCASIONAL FEELINGS OF UNSTEADINESS: 1
HEMATOLOGIC/LYMPHATIC NEGATIVE: 1
LIGHT-HEADEDNESS: 1
FATIGUE: 1
WEAKNESS: 1
RESPIRATORY NEGATIVE: 1
NAUSEA: 1
ENDOCRINE NEGATIVE: 1
HEADACHES: 1
LOSS OF SENSATION IN FEET: 1
DEPRESSION: 0
ALLERGIC/IMMUNOLOGIC NEGATIVE: 1
CONFUSION: 1
NECK STIFFNESS: 1
BACK PAIN: 1
PALPITATIONS: 1
DIZZINESS: 1

## 2024-09-03 ASSESSMENT — PAIN SCALES - GENERAL: PAINLEVEL: 4
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
## 2024-09-04 ENCOUNTER — APPOINTMENT (OUTPATIENT)
Dept: PRIMARY CARE | Facility: CLINIC | Age: 54
End: 2024-09-04
Payer: MEDICARE

## 2024-09-11 ENCOUNTER — APPOINTMENT (OUTPATIENT)
Dept: SLEEP MEDICINE | Facility: CLINIC | Age: 54
End: 2024-09-11
Payer: MEDICARE

## 2024-09-16 DIAGNOSIS — F41.9 ANXIETY: ICD-10-CM

## 2024-09-17 RX ORDER — SERTRALINE HYDROCHLORIDE 100 MG/1
100 TABLET, FILM COATED ORAL DAILY
Qty: 90 TABLET | Refills: 0 | Status: SHIPPED | OUTPATIENT
Start: 2024-09-17

## 2024-09-30 ENCOUNTER — OFFICE VISIT (OUTPATIENT)
Dept: PAIN MEDICINE | Facility: CLINIC | Age: 54
End: 2024-09-30
Payer: MEDICARE
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
## 2024-09-30 DIAGNOSIS — M54.16 LUMBAR RADICULOPATHY: ICD-10-CM

## 2024-09-30 DIAGNOSIS — M54.16 LUMBAR RADICULITIS: ICD-10-CM

## 2024-09-30 PROCEDURE — 99203 OFFICE O/P NEW LOW 30 MIN: CPT | Performed by: ANESTHESIOLOGY

## 2024-09-30 PROCEDURE — 99213 OFFICE O/P EST LOW 20 MIN: CPT | Performed by: ANESTHESIOLOGY

## 2024-09-30 RX ORDER — DIAZEPAM 5 MG/1
TABLET ORAL
Qty: 4 TABLET | Refills: 0 | Status: SHIPPED | OUTPATIENT
Start: 2024-09-30

## 2024-09-30 RX ORDER — GABAPENTIN 300 MG/1
CAPSULE ORAL
Qty: 35 CAPSULE | Refills: 0 | Status: SHIPPED | OUTPATIENT
Start: 2024-09-30

## 2024-09-30 ASSESSMENT — PAIN SCALES - GENERAL: PAINLEVEL_OUTOF10: 6

## 2024-09-30 ASSESSMENT — PAIN - FUNCTIONAL ASSESSMENT: PAIN_FUNCTIONAL_ASSESSMENT: 0-10

## 2024-09-30 ASSESSMENT — PAIN DESCRIPTION - DESCRIPTORS: DESCRIPTORS: RADIATING;BURNING
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
## 2024-10-08 PROBLEM — L97.822 NON-PRESSURE CHRONIC ULCER OF OTHER PART OF LEFT LOWER LEG WITH FAT LAYER EXPOSED: Status: ACTIVE | Noted: 2024-10-08

## 2024-10-09 ENCOUNTER — APPOINTMENT (OUTPATIENT)
Dept: PRIMARY CARE | Facility: CLINIC | Age: 54
End: 2024-10-09
Payer: MEDICARE

## 2024-10-09 NOTE — H&P
HISTORY AND PHYSICAL    History Of Present Illness  Gisselle Avalos is a 54 y.o. female presenting with chronic pain.  Here for Lumbar interlaminar epidural steroid injection    she denies any recent antibiotic use or infections, she denies any blood thinner use , and she denies contrast or local anesthetic allergies     Pre-sedation evaluation:  ASA Classification (bolded):   ASA I: Healthy patient, non-smoking, no none or minimal alcohol use  ASA II: Patient with mild systemic disease, without substantiative functional limitations.  Current smoker, social alcohol drinker, pregnancy, obesity (BMI 30-40)DM/HTN,, well-controlled mild lung disease  ASA III: Patient with severe systemic disease; substantiative of functional limitation; One or more moderate to severe diseases: Poorly controlled DM/HTN, COPD, morbid obesity (BMI>40), active hepatitis, alcohol abuse/dependence, implanted pacemaker, moderate reduction of ejection fraction, ESRD on dialysis, history (>3months) of MI, CVA, TIA or CAD/stents  ASA IV: Patient with severe systemic disease that is a constant threat to life; recent (<3 months) MI, CVA, TIA or CAD/stents, ongoing cardiac ischemia or severe valvular dysfunction, severely reduced ejection fraction, shock, sepsis, DIC, ESRD not undergoing regular scheduled dialysis    Mallampati score (bolded):   Class I: Complete visualization of the soft palate  Class II: Complete visualization of the uvula  Class III: Visualization of only the base of the uvula  Class IV: Soft palate is not visible at all    Past Medical History  Past Medical History:   Diagnosis Date    Acute bronchitis 01/26/2024    Acute sinusitis 08/07/2008    Backache 06/18/2009    Contracture of muscle, left upper arm 09/02/2021    Contracture of muscle of left upper arm    Dog bite of lower leg 09/19/2022    Encounter for screening for malignant neoplasm of colon 09/02/2021    Colon cancer screening    Encounter for screening for  malignant neoplasm of colon 09/02/2021    Colon cancer screening    Other specified abnormal findings of blood chemistry 06/23/2022    D-dimer, elevated    Pain in right knee 04/15/2022    Right knee pain    Person with feared health complaint in whom no diagnosis is made 07/22/2022    Concern about cardiovascular disease without diagnosis    Personal history of other medical treatment 09/02/2021    History of screening mammography    Urinary tract infection, site not specified 07/25/2022    Acute UTI       Surgical History  Past Surgical History:   Procedure Laterality Date    CHOLECYSTECTOMY      HYSTERECTOMY      OTHER SURGICAL HISTORY  09/02/2021    Gallbladder surgery    OTHER SURGICAL HISTORY  09/02/2021    Hysterectomy        Social History  She reports that she has been smoking cigarettes. She started smoking about 40 years ago. She has a 40.8 pack-year smoking history. She has never used smokeless tobacco. She reports current alcohol use. She reports that she does not currently use drugs after having used the following drugs: Marijuana.    Family History  Family History   Problem Relation Name Age of Onset    Breast cancer Mother Netta bain     Arthritis Mother Netta bain     Asthma Mother Netta bain     Hearing loss Father Richard bain     Cancer Father Richard bain         unknown cancer type    Coronary artery disease Father Richard bain 52    Brain cancer Sister Nguyen     Heart disease Sister Nguyen     Breast cancer Sister Kely     Hypertension Sister Kely     Prostate cancer Brother x3     Diabetes Brother x3     Sleep apnea Daughter      Restless legs syndrome Daughter          Allergies  Amitriptyline and Penicillins    Review of Systems   12 point ROS done and negative except for the above.   Physical Exam     General: NAD, well groomed, well nourished  Eyes: Non-icteric sclera, EOMI  Ears, Nose, Mouth, and Throat: External ears and nose appear to be without deformity or rash. No  "lesions or masses noted. Hearing is grossly intact.   Neck: Trachea midline  Respiratory: Nonlabored breathing   Cardiovascular: No peripheral edema   Skin: No rashes or open lesions/ulcers identified on skin.    Last Recorded Vitals  There were no vitals taken for this visit.    Relevant Results  Current Outpatient Medications   Medication Instructions    atorvastatin (LIPITOR) 10 mg, oral, Nightly    busPIRone (BUSPAR) 10 mg, oral, 2 times daily    diazePAM (Valium) 5 mg tablet TAKE 1-2 TABS PO ONE HOUR PRIOR TO PROCEDURE. MAY REPEAT UPON ARRIVAL TO PROCEDURE    gabapentin (Neurontin) 300 mg capsule TAKE 2 CAPSULES BY MOUTH AT BEDTIME FOR 7 DAYS, THEN TAKE 3 CAPSULES BY MOUTH AT BEDTIME AND CONTINUE ON THIS DOSE. CALL OFFICE FOR REFILLS.    gabapentin (NEURONTIN) 100 mg, oral, 3 times daily    MAGNESIUM GLYCINATE ORAL 2 capsules, oral, Nightly    methocarbamol (ROBAXIN) 500 mg, oral, 3 times daily PRN, As needed for pain    omeprazole (PRILOSEC) 40 mg, oral, Daily RT    ondansetron ODT (ZOFRAN-ODT) 4 mg, oral, Every 8 hours PRN    sertraline (ZOLOFT) 100 mg, oral, Daily    traZODone (DESYREL) 100 mg, oral, Nightly      Lab Results   Component Value Date    WBC 7.1 04/04/2024    HGB 14.9 04/04/2024    HCT 47.1 (H) 04/04/2024    MCV 90 04/04/2024     04/04/2024      No results found for: \"INR\", \"PROTIME\"  No results found for: \"PTT\"  Lab Results   Component Value Date    GLUCOSE 101 (H) 04/04/2024    CALCIUM 9.7 04/04/2024     04/04/2024    K 4.8 04/04/2024    CO2 28 04/04/2024     04/04/2024    BUN 15 04/04/2024    CREATININE 0.75 04/04/2024       === 07/11/24 ===    MR BRAIN W AND WO CONTRAST    - Impression -  Mild white-matter changes are nonspecific and may be seen with  chronic hypertension, migraine headaches, small vessel ischemic  disease, among others. No evidence of acute ischemic injury or  intracranial mass.    MACRO:  None    Signed by: Meg Barrientos 7/11/2024 10:25 AM  Dictation " workstation:   [id number]       Assessment/Plan   Gisselle Avalos is a 54 y.o. F who presents for Lumbar interlaminar epidural steroid injection.     Risks, benefits, alternatives discussed. All questions answered to the best of my ability. Patient agrees to proceed. Consent signed and patient marked appropriately.    -We will proceed with planned procedure        Arvind Silva MD  Interventional Pain Fellow, PGY-5  [facility]

## 2024-10-10 ENCOUNTER — HOSPITAL ENCOUNTER (OUTPATIENT)
Dept: PAIN MEDICINE | Facility: CLINIC | Age: 54
Discharge: HOME | End: 2024-10-10
Payer: MEDICARE

## 2024-10-10 VITALS
OXYGEN SATURATION: 96 % | DIASTOLIC BLOOD PRESSURE: 78 MMHG | SYSTOLIC BLOOD PRESSURE: 118 MMHG | HEART RATE: 75 BPM | RESPIRATION RATE: 16 BRPM

## 2024-10-10 DIAGNOSIS — M54.16 LUMBAR RADICULITIS: ICD-10-CM

## 2024-10-10 PROCEDURE — 62323 NJX INTERLAMINAR LMBR/SAC: CPT | Performed by: ANESTHESIOLOGY

## 2024-10-10 PROCEDURE — 2500000005 HC RX 250 GENERAL PHARMACY W/O HCPCS: Performed by: ANESTHESIOLOGY

## 2024-10-10 PROCEDURE — 2500000004 HC RX 250 GENERAL PHARMACY W/ HCPCS (ALT 636 FOR OP/ED): Performed by: ANESTHESIOLOGY

## 2024-10-10 PROCEDURE — 2550000001 HC RX 255 CONTRASTS: Performed by: ANESTHESIOLOGY

## 2024-10-10 RX ORDER — LIDOCAINE HYDROCHLORIDE 5 MG/ML
INJECTION, SOLUTION INFILTRATION; INTRAVENOUS AS NEEDED
Status: COMPLETED | OUTPATIENT
Start: 2024-10-10 | End: 2024-10-10

## 2024-10-10 RX ORDER — INDOMETHACIN 25 MG/1
CAPSULE ORAL AS NEEDED
Status: COMPLETED | OUTPATIENT
Start: 2024-10-10 | End: 2024-10-10

## 2024-10-10 RX ORDER — SODIUM CHLORIDE 9 MG/ML
INJECTION, SOLUTION INTRAMUSCULAR; INTRAVENOUS; SUBCUTANEOUS AS NEEDED
Status: COMPLETED | OUTPATIENT
Start: 2024-10-10 | End: 2024-10-10

## 2024-10-10 RX ORDER — METHYLPREDNISOLONE ACETATE 40 MG/ML
INJECTION, SUSPENSION INTRA-ARTICULAR; INTRALESIONAL; INTRAMUSCULAR; SOFT TISSUE AS NEEDED
Status: COMPLETED | OUTPATIENT
Start: 2024-10-10 | End: 2024-10-10

## 2024-10-10 ASSESSMENT — PAIN SCALES - GENERAL
PAINLEVEL_OUTOF10: 6
PAINLEVEL_OUTOF10: 0 - NO PAIN

## 2024-10-10 ASSESSMENT — PAIN - FUNCTIONAL ASSESSMENT: PAIN_FUNCTIONAL_ASSESSMENT: 0-10

## 2024-10-10 ASSESSMENT — PAIN DESCRIPTION - DESCRIPTORS: DESCRIPTORS: ACHING;BURNING

## 2024-10-10 NOTE — DISCHARGE INSTRUCTIONS
[facility]  [facility] [address]  [address] [id number] [phone]    POST PROCEDURE INSTRUCTIONS    Activity  Medication used during your procedure may cause some temporary weakness or numbness in your arms or legs, depending on the type of injection you received. It is recommended that you do not drive or operate machinery the day of your injection.  You do not need to stay in bed when you get home. You should be able to resume your normal activities, including work. However, any pre-existing physical restriction you had prior to the procedure may still remain.   Have a responsible adult with you if you received sedation for the procedure. Do not drive or operate machinery for 12 hours.    Pain  Immediate pain relief from the local anesthetic will wear off after several hours.  Prolonged relief from the steroid may take 3-14 days to occur.  Some patients may experience a “flare up” of their normal pain for a few days after the procedure. You may apply ice packs to the sore area for 15minutes on/ 2 hours off and take your prescribed or over the counter analgesics as needed.  Common side effects from the steroid include facial flushing, headaches,fluid retention,trouble sleeping,and cold like symptoms for 24-48 hours post procedure.  Patients receiving diagnostic injections (no steroid): pain relief is intended to be temporary. Pay attention to the percentage of pain relief that occurs compared to before your injection so you can report this to your doctor. Pain scores before and after the procedure need to be documented.    Medications  Resume your normal medications unless otherwise instructed  If you held your blood thinning medication for the procedure,you will be instructed on when to restart.    Injection site care  Keep the injection site clean and dry. You may shower and remove the Band Aid after you arrive home.  If you notice excessive  bleeding (slow general oozing that completely soaks the dressing or fresh bright red bleeding),apply pressure and call our office immediately.  Observe for signs of infection: increasing pain at injection site, redness, swelling, drainage with a foul smell, any associated fever or chills                        If you notice any of these, call our office immediately.    Diabetic patients   You may notice an elevation in blood sugar if steroid is used. Notify your primary care doctor if blood sugar levels do not return to normal.    Follow up  Make a virtual injection follow up appointment  with Dr. Gordon in 3-4 weeks      Call our office at [phone] to speak to the clinical staff with any concerns or problems.  Go to the nearest emergency room if you are not able to reach us and your problem is urgent.  Call [phone] if you develop serious symptoms such as: chest pain or difficulty breathing.

## 2024-10-28 ENCOUNTER — OFFICE VISIT (OUTPATIENT)
Dept: PAIN MEDICINE | Facility: CLINIC | Age: 54
End: 2024-10-28
Payer: MEDICARE

## 2024-10-28 VITALS
OXYGEN SATURATION: 97 % | DIASTOLIC BLOOD PRESSURE: 69 MMHG | SYSTOLIC BLOOD PRESSURE: 116 MMHG | RESPIRATION RATE: 18 BRPM | HEART RATE: 81 BPM

## 2024-10-28 DIAGNOSIS — M54.16 LUMBAR RADICULOPATHY: Primary | ICD-10-CM

## 2024-10-28 PROCEDURE — 99213 OFFICE O/P EST LOW 20 MIN: CPT | Performed by: NURSE PRACTITIONER

## 2024-10-28 ASSESSMENT — ENCOUNTER SYMPTOMS
TREMORS: 0
NECK STIFFNESS: 0
SEIZURES: 0
FACIAL ASYMMETRY: 0
CONSTITUTIONAL NEGATIVE: 1
PSYCHIATRIC NEGATIVE: 1
ALLERGIC/IMMUNOLOGIC NEGATIVE: 1
LIGHT-HEADEDNESS: 0
RESPIRATORY NEGATIVE: 1
ARTHRALGIAS: 1
MYALGIAS: 1
BACK PAIN: 1
ENDOCRINE NEGATIVE: 1
CARDIOVASCULAR NEGATIVE: 1
PAIN: 1
NECK PAIN: 0
SPEECH DIFFICULTY: 0
EYES NEGATIVE: 1
NUMBNESS: 1
WEAKNESS: 0
GASTROINTESTINAL NEGATIVE: 1
DIZZINESS: 1
HEMATOLOGIC/LYMPHATIC NEGATIVE: 1
JOINT SWELLING: 0
HEADACHES: 0

## 2024-10-28 ASSESSMENT — PAIN SCALES - GENERAL: PAINLEVEL_OUTOF10: 10-WORST PAIN EVER

## 2024-11-03 DIAGNOSIS — G47.00 INSOMNIA, UNSPECIFIED TYPE: ICD-10-CM

## 2024-11-03 DIAGNOSIS — K21.9 GASTROESOPHAGEAL REFLUX DISEASE, UNSPECIFIED WHETHER ESOPHAGITIS PRESENT: ICD-10-CM

## 2024-11-03 DIAGNOSIS — F41.9 ANXIETY: ICD-10-CM

## 2024-11-04 NOTE — TELEPHONE ENCOUNTER
Last OV 6/20/24, canceled 7/3/24, NS 8/29/24, NS 10/9/24, please schedule appt for pt and return to me.

## 2024-12-07 ENCOUNTER — APPOINTMENT (OUTPATIENT)
Dept: CARDIOLOGY | Facility: HOSPITAL | Age: 54
DRG: 308 | End: 2024-12-07
Payer: MEDICARE

## 2024-12-07 ENCOUNTER — HOSPITAL ENCOUNTER (INPATIENT)
Facility: HOSPITAL | Age: 54
LOS: 1 days | Discharge: HOME | End: 2024-12-08
Attending: STUDENT IN AN ORGANIZED HEALTH CARE EDUCATION/TRAINING PROGRAM | Admitting: STUDENT IN AN ORGANIZED HEALTH CARE EDUCATION/TRAINING PROGRAM
Payer: MEDICARE

## 2024-12-07 ENCOUNTER — APPOINTMENT (OUTPATIENT)
Dept: RADIOLOGY | Facility: HOSPITAL | Age: 54
DRG: 308 | End: 2024-12-07
Payer: MEDICARE

## 2024-12-07 DIAGNOSIS — L97.822 NON-PRESSURE CHRONIC ULCER OF OTHER PART OF LEFT LOWER LEG WITH FAT LAYER EXPOSED: ICD-10-CM

## 2024-12-07 DIAGNOSIS — G25.81 RESTLESS LEGS SYNDROME: ICD-10-CM

## 2024-12-07 DIAGNOSIS — R30.0 DYSURIA: ICD-10-CM

## 2024-12-07 DIAGNOSIS — Z72.0 SMOKING TRYING TO QUIT: ICD-10-CM

## 2024-12-07 DIAGNOSIS — G47.33 OSA (OBSTRUCTIVE SLEEP APNEA): ICD-10-CM

## 2024-12-07 DIAGNOSIS — R07.89 ATYPICAL CHEST PAIN: ICD-10-CM

## 2024-12-07 DIAGNOSIS — R07.9 CHEST PAIN, UNSPECIFIED TYPE: Primary | ICD-10-CM

## 2024-12-07 DIAGNOSIS — F17.200 TOBACCO USE DISORDER: ICD-10-CM

## 2024-12-07 DIAGNOSIS — R55 SYNCOPE AND COLLAPSE: ICD-10-CM

## 2024-12-07 DIAGNOSIS — F41.9 ANXIETY: ICD-10-CM

## 2024-12-07 DIAGNOSIS — R09.82 POSTNASAL DRIP: ICD-10-CM

## 2024-12-07 DIAGNOSIS — R00.2 PALPITATIONS: ICD-10-CM

## 2024-12-07 DIAGNOSIS — R40.0 SOMNOLENCE, DAYTIME: ICD-10-CM

## 2024-12-07 DIAGNOSIS — I47.10 SUPRAVENTRICULAR TACHYCARDIA (CMS-HCC): ICD-10-CM

## 2024-12-07 DIAGNOSIS — R92.1 BREAST CALCIFICATION, RIGHT: ICD-10-CM

## 2024-12-07 DIAGNOSIS — R63.5 ABNORMAL WEIGHT GAIN: ICD-10-CM

## 2024-12-07 DIAGNOSIS — R20.0 BILATERAL HAND NUMBNESS: ICD-10-CM

## 2024-12-07 DIAGNOSIS — R55 VASOVAGAL SYNCOPE: ICD-10-CM

## 2024-12-07 DIAGNOSIS — R42 DIZZINESS: ICD-10-CM

## 2024-12-07 DIAGNOSIS — M25.50 POLYARTHRALGIA: ICD-10-CM

## 2024-12-07 DIAGNOSIS — R06.02 SOB (SHORTNESS OF BREATH) ON EXERTION: ICD-10-CM

## 2024-12-07 DIAGNOSIS — R91.8 MULTIPLE LUNG NODULES ON CT: ICD-10-CM

## 2024-12-07 DIAGNOSIS — K76.0 FATTY LIVER: ICD-10-CM

## 2024-12-07 DIAGNOSIS — Z82.49 FAMILY HISTORY OF EARLY CAD: ICD-10-CM

## 2024-12-07 DIAGNOSIS — M54.16 LUMBAR RADICULOPATHY: ICD-10-CM

## 2024-12-07 DIAGNOSIS — L68.0 HIRSUTISM: ICD-10-CM

## 2024-12-07 DIAGNOSIS — Z13.6 SCREENING FOR HEART DISEASE: ICD-10-CM

## 2024-12-07 DIAGNOSIS — R92.8 ABNORMAL MAMMOGRAM: ICD-10-CM

## 2024-12-07 DIAGNOSIS — R06.00 PND (PAROXYSMAL NOCTURNAL DYSPNEA): ICD-10-CM

## 2024-12-07 DIAGNOSIS — J38.5 LARYNGOSPASM: ICD-10-CM

## 2024-12-07 DIAGNOSIS — F41.1 GENERALIZED ANXIETY DISORDER: ICD-10-CM

## 2024-12-07 DIAGNOSIS — J18.9 PNEUMONIA DUE TO INFECTIOUS ORGANISM, UNSPECIFIED LATERALITY, UNSPECIFIED PART OF LUNG: ICD-10-CM

## 2024-12-07 LAB
ALBUMIN SERPL BCP-MCNC: 4 G/DL (ref 3.4–5)
ALP SERPL-CCNC: 54 U/L (ref 33–110)
ALT SERPL W P-5'-P-CCNC: 26 U/L (ref 7–45)
ANION GAP SERPL CALC-SCNC: 15 MMOL/L (ref 10–20)
AST SERPL W P-5'-P-CCNC: 20 U/L (ref 9–39)
BASOPHILS # BLD AUTO: 0.03 X10*3/UL (ref 0–0.1)
BASOPHILS NFR BLD AUTO: 0.3 %
BILIRUB SERPL-MCNC: 0.3 MG/DL (ref 0–1.2)
BUN SERPL-MCNC: 17 MG/DL (ref 6–23)
CALCIUM SERPL-MCNC: 9.1 MG/DL (ref 8.6–10.3)
CARDIAC TROPONIN I PNL SERPL HS: 5 NG/L (ref 0–13)
CHLORIDE SERPL-SCNC: 105 MMOL/L (ref 98–107)
CO2 SERPL-SCNC: 22 MMOL/L (ref 21–32)
CREAT SERPL-MCNC: 0.91 MG/DL (ref 0.5–1.05)
EGFRCR SERPLBLD CKD-EPI 2021: 74 ML/MIN/1.73M*2
EOSINOPHIL # BLD AUTO: 0.2 X10*3/UL (ref 0–0.7)
EOSINOPHIL NFR BLD AUTO: 1.7 %
ERYTHROCYTE [DISTWIDTH] IN BLOOD BY AUTOMATED COUNT: 13.2 % (ref 11.5–14.5)
GLUCOSE SERPL-MCNC: 111 MG/DL (ref 74–99)
HCT VFR BLD AUTO: 42.8 % (ref 36–46)
HGB BLD-MCNC: 14.3 G/DL (ref 12–16)
IMM GRANULOCYTES # BLD AUTO: 0.04 X10*3/UL (ref 0–0.7)
IMM GRANULOCYTES NFR BLD AUTO: 0.3 % (ref 0–0.9)
LYMPHOCYTES # BLD AUTO: 4.21 X10*3/UL (ref 1.2–4.8)
LYMPHOCYTES NFR BLD AUTO: 35.6 %
MAGNESIUM SERPL-MCNC: 1.75 MG/DL (ref 1.6–2.4)
MCH RBC QN AUTO: 29.9 PG (ref 26–34)
MCHC RBC AUTO-ENTMCNC: 33.4 G/DL (ref 32–36)
MCV RBC AUTO: 90 FL (ref 80–100)
MONOCYTES # BLD AUTO: 0.75 X10*3/UL (ref 0.1–1)
MONOCYTES NFR BLD AUTO: 6.3 %
NEUTROPHILS # BLD AUTO: 6.6 X10*3/UL (ref 1.2–7.7)
NEUTROPHILS NFR BLD AUTO: 55.8 %
NRBC BLD-RTO: 0 /100 WBCS (ref 0–0)
PLATELET # BLD AUTO: 354 X10*3/UL (ref 150–450)
POTASSIUM SERPL-SCNC: 3.9 MMOL/L (ref 3.5–5.3)
PROT SERPL-MCNC: 6.4 G/DL (ref 6.4–8.2)
RBC # BLD AUTO: 4.78 X10*6/UL (ref 4–5.2)
SODIUM SERPL-SCNC: 138 MMOL/L (ref 136–145)
WBC # BLD AUTO: 11.8 X10*3/UL (ref 4.4–11.3)

## 2024-12-07 PROCEDURE — 99285 EMERGENCY DEPT VISIT HI MDM: CPT | Mod: 25 | Performed by: STUDENT IN AN ORGANIZED HEALTH CARE EDUCATION/TRAINING PROGRAM

## 2024-12-07 PROCEDURE — 83735 ASSAY OF MAGNESIUM: CPT | Performed by: PHYSICIAN ASSISTANT

## 2024-12-07 PROCEDURE — 84484 ASSAY OF TROPONIN QUANT: CPT | Performed by: PHYSICIAN ASSISTANT

## 2024-12-07 PROCEDURE — 2500000004 HC RX 250 GENERAL PHARMACY W/ HCPCS (ALT 636 FOR OP/ED): Performed by: STUDENT IN AN ORGANIZED HEALTH CARE EDUCATION/TRAINING PROGRAM

## 2024-12-07 PROCEDURE — 93005 ELECTROCARDIOGRAM TRACING: CPT

## 2024-12-07 PROCEDURE — 71275 CT ANGIOGRAPHY CHEST: CPT | Performed by: STUDENT IN AN ORGANIZED HEALTH CARE EDUCATION/TRAINING PROGRAM

## 2024-12-07 PROCEDURE — 80053 COMPREHEN METABOLIC PANEL: CPT | Performed by: PHYSICIAN ASSISTANT

## 2024-12-07 PROCEDURE — 2500000004 HC RX 250 GENERAL PHARMACY W/ HCPCS (ALT 636 FOR OP/ED): Performed by: PHYSICIAN ASSISTANT

## 2024-12-07 PROCEDURE — 96361 HYDRATE IV INFUSION ADD-ON: CPT

## 2024-12-07 PROCEDURE — 71275 CT ANGIOGRAPHY CHEST: CPT

## 2024-12-07 PROCEDURE — 96375 TX/PRO/DX INJ NEW DRUG ADDON: CPT

## 2024-12-07 PROCEDURE — 85025 COMPLETE CBC W/AUTO DIFF WBC: CPT | Performed by: PHYSICIAN ASSISTANT

## 2024-12-07 PROCEDURE — 36415 COLL VENOUS BLD VENIPUNCTURE: CPT | Performed by: PHYSICIAN ASSISTANT

## 2024-12-07 PROCEDURE — 2550000001 HC RX 255 CONTRASTS: Performed by: STUDENT IN AN ORGANIZED HEALTH CARE EDUCATION/TRAINING PROGRAM

## 2024-12-07 RX ORDER — ADENOSINE 3 MG/ML
12 INJECTION, SOLUTION INTRAVENOUS ONCE
Status: COMPLETED | OUTPATIENT
Start: 2024-12-07 | End: 2024-12-07

## 2024-12-07 RX ORDER — ADENOSINE 3 MG/ML
INJECTION, SOLUTION INTRAVENOUS
Status: COMPLETED
Start: 2024-12-07 | End: 2024-12-07

## 2024-12-07 RX ADMIN — SODIUM CHLORIDE 1000 ML: 9 INJECTION, SOLUTION INTRAVENOUS at 23:18

## 2024-12-07 RX ADMIN — ADENOSINE 12 MG: 3 INJECTION, SOLUTION INTRAVENOUS at 23:15

## 2024-12-07 RX ADMIN — IOHEXOL 81 ML: 350 INJECTION, SOLUTION INTRAVENOUS at 23:54

## 2024-12-07 ASSESSMENT — PAIN DESCRIPTION - ORIENTATION: ORIENTATION: LEFT

## 2024-12-07 ASSESSMENT — LIFESTYLE VARIABLES
HAVE PEOPLE ANNOYED YOU BY CRITICIZING YOUR DRINKING: NO
HAVE YOU EVER FELT YOU SHOULD CUT DOWN ON YOUR DRINKING: NO
EVER FELT BAD OR GUILTY ABOUT YOUR DRINKING: NO
TOTAL SCORE: 0
EVER HAD A DRINK FIRST THING IN THE MORNING TO STEADY YOUR NERVES TO GET RID OF A HANGOVER: NO

## 2024-12-07 ASSESSMENT — PAIN DESCRIPTION - PAIN TYPE: TYPE: ACUTE PAIN

## 2024-12-07 ASSESSMENT — HEART SCORE
AGE: 45-64
TROPONIN: LESS THAN OR EQUAL TO NORMAL LIMIT
HISTORY: MODERATELY SUSPICIOUS
HEART SCORE: 3
RISK FACTORS: 1-2 RISK FACTORS
ECG: NORMAL

## 2024-12-07 ASSESSMENT — PAIN - FUNCTIONAL ASSESSMENT: PAIN_FUNCTIONAL_ASSESSMENT: 0-10

## 2024-12-07 ASSESSMENT — PAIN DESCRIPTION - DESCRIPTORS: DESCRIPTORS: STABBING

## 2024-12-07 ASSESSMENT — PAIN DESCRIPTION - LOCATION: LOCATION: SHOULDER

## 2024-12-07 ASSESSMENT — PAIN SCALES - GENERAL
PAINLEVEL_OUTOF10: 7
PAINLEVEL_OUTOF10: 3

## 2024-12-08 ENCOUNTER — APPOINTMENT (OUTPATIENT)
Dept: CARDIOLOGY | Facility: HOSPITAL | Age: 54
DRG: 308 | End: 2024-12-08
Payer: MEDICARE

## 2024-12-08 VITALS
TEMPERATURE: 96.8 F | DIASTOLIC BLOOD PRESSURE: 69 MMHG | WEIGHT: 210 LBS | HEART RATE: 96 BPM | BODY MASS INDEX: 31.1 KG/M2 | OXYGEN SATURATION: 98 % | HEIGHT: 69 IN | RESPIRATION RATE: 20 BRPM | SYSTOLIC BLOOD PRESSURE: 110 MMHG

## 2024-12-08 PROBLEM — R07.9 CHEST PAIN, UNSPECIFIED TYPE: Status: ACTIVE | Noted: 2024-12-08

## 2024-12-08 LAB
ALBUMIN SERPL BCP-MCNC: 3.5 G/DL (ref 3.4–5)
ANION GAP SERPL CALC-SCNC: 11 MMOL/L (ref 10–20)
BACTERIA BLD CULT: NORMAL
BACTERIA BLD CULT: NORMAL
BNP SERPL-MCNC: 42 PG/ML (ref 0–99)
BUN SERPL-MCNC: 14 MG/DL (ref 6–23)
CALCIUM SERPL-MCNC: 8.4 MG/DL (ref 8.6–10.3)
CARDIAC TROPONIN I PNL SERPL HS: 6 NG/L (ref 0–13)
CHLORIDE SERPL-SCNC: 111 MMOL/L (ref 98–107)
CHOLEST SERPL-MCNC: 192 MG/DL (ref 0–199)
CHOLESTEROL/HDL RATIO: 4.2
CO2 SERPL-SCNC: 23 MMOL/L (ref 21–32)
CREAT SERPL-MCNC: 0.76 MG/DL (ref 0.5–1.05)
EGFRCR SERPLBLD CKD-EPI 2021: >90 ML/MIN/1.73M*2
ERYTHROCYTE [DISTWIDTH] IN BLOOD BY AUTOMATED COUNT: 13.2 % (ref 11.5–14.5)
EST. AVERAGE GLUCOSE BLD GHB EST-MCNC: 117 MG/DL
FLUAV RNA RESP QL NAA+PROBE: NOT DETECTED
FLUBV RNA RESP QL NAA+PROBE: NOT DETECTED
GLUCOSE SERPL-MCNC: 105 MG/DL (ref 74–99)
HBA1C MFR BLD: 5.7 %
HCT VFR BLD AUTO: 39.6 % (ref 36–46)
HDLC SERPL-MCNC: 45.2 MG/DL
HGB BLD-MCNC: 12.9 G/DL (ref 12–16)
LACTATE SERPL-SCNC: 0.5 MMOL/L (ref 0.4–2)
LDLC SERPL CALC-MCNC: 123 MG/DL
MAGNESIUM SERPL-MCNC: 1.77 MG/DL (ref 1.6–2.4)
MCH RBC QN AUTO: 29.5 PG (ref 26–34)
MCHC RBC AUTO-ENTMCNC: 32.6 G/DL (ref 32–36)
MCV RBC AUTO: 90 FL (ref 80–100)
NON HDL CHOLESTEROL: 147 MG/DL (ref 0–149)
NRBC BLD-RTO: 0 /100 WBCS (ref 0–0)
PHOSPHATE SERPL-MCNC: 3.6 MG/DL (ref 2.5–4.9)
PLATELET # BLD AUTO: 295 X10*3/UL (ref 150–450)
POTASSIUM SERPL-SCNC: 4 MMOL/L (ref 3.5–5.3)
RBC # BLD AUTO: 4.38 X10*6/UL (ref 4–5.2)
SARS-COV-2 RNA RESP QL NAA+PROBE: NOT DETECTED
SODIUM SERPL-SCNC: 141 MMOL/L (ref 136–145)
TRIGL SERPL-MCNC: 119 MG/DL (ref 0–149)
TSH SERPL-ACNC: 1.92 MIU/L (ref 0.44–3.98)
TSH SERPL-ACNC: 3.77 MIU/L (ref 0.44–3.98)
VLDL: 24 MG/DL (ref 0–40)
WBC # BLD AUTO: 7.1 X10*3/UL (ref 4.4–11.3)

## 2024-12-08 PROCEDURE — 84145 PROCALCITONIN (PCT): CPT | Mod: GEALAB

## 2024-12-08 PROCEDURE — 80061 LIPID PANEL: CPT

## 2024-12-08 PROCEDURE — 93005 ELECTROCARDIOGRAM TRACING: CPT

## 2024-12-08 PROCEDURE — 83036 HEMOGLOBIN GLYCOSYLATED A1C: CPT | Mod: GEALAB

## 2024-12-08 PROCEDURE — 99234 HOSP IP/OBS SM DT SF/LOW 45: CPT | Performed by: STUDENT IN AN ORGANIZED HEALTH CARE EDUCATION/TRAINING PROGRAM

## 2024-12-08 PROCEDURE — 87636 SARSCOV2 & INF A&B AMP PRB: CPT

## 2024-12-08 PROCEDURE — 85027 COMPLETE CBC AUTOMATED: CPT

## 2024-12-08 PROCEDURE — 2500000004 HC RX 250 GENERAL PHARMACY W/ HCPCS (ALT 636 FOR OP/ED)

## 2024-12-08 PROCEDURE — 96367 TX/PROPH/DG ADDL SEQ IV INF: CPT

## 2024-12-08 PROCEDURE — 84443 ASSAY THYROID STIM HORMONE: CPT | Performed by: STUDENT IN AN ORGANIZED HEALTH CARE EDUCATION/TRAINING PROGRAM

## 2024-12-08 PROCEDURE — 36415 COLL VENOUS BLD VENIPUNCTURE: CPT | Performed by: STUDENT IN AN ORGANIZED HEALTH CARE EDUCATION/TRAINING PROGRAM

## 2024-12-08 PROCEDURE — 36415 COLL VENOUS BLD VENIPUNCTURE: CPT | Performed by: PHYSICIAN ASSISTANT

## 2024-12-08 PROCEDURE — 2500000004 HC RX 250 GENERAL PHARMACY W/ HCPCS (ALT 636 FOR OP/ED): Performed by: STUDENT IN AN ORGANIZED HEALTH CARE EDUCATION/TRAINING PROGRAM

## 2024-12-08 PROCEDURE — 1200000002 HC GENERAL ROOM WITH TELEMETRY DAILY

## 2024-12-08 PROCEDURE — 87040 BLOOD CULTURE FOR BACTERIA: CPT | Mod: GEALAB | Performed by: STUDENT IN AN ORGANIZED HEALTH CARE EDUCATION/TRAINING PROGRAM

## 2024-12-08 PROCEDURE — 80069 RENAL FUNCTION PANEL: CPT

## 2024-12-08 PROCEDURE — 99222 1ST HOSP IP/OBS MODERATE 55: CPT | Performed by: NURSE PRACTITIONER

## 2024-12-08 PROCEDURE — 83735 ASSAY OF MAGNESIUM: CPT

## 2024-12-08 PROCEDURE — 83605 ASSAY OF LACTIC ACID: CPT | Performed by: STUDENT IN AN ORGANIZED HEALTH CARE EDUCATION/TRAINING PROGRAM

## 2024-12-08 PROCEDURE — 87081 CULTURE SCREEN ONLY: CPT | Mod: GEALAB

## 2024-12-08 PROCEDURE — 84484 ASSAY OF TROPONIN QUANT: CPT | Performed by: PHYSICIAN ASSISTANT

## 2024-12-08 PROCEDURE — 84443 ASSAY THYROID STIM HORMONE: CPT

## 2024-12-08 PROCEDURE — 96365 THER/PROPH/DIAG IV INF INIT: CPT

## 2024-12-08 PROCEDURE — 83880 ASSAY OF NATRIURETIC PEPTIDE: CPT

## 2024-12-08 RX ORDER — ENOXAPARIN SODIUM 100 MG/ML
40 INJECTION SUBCUTANEOUS EVERY 24 HOURS
Status: DISCONTINUED | OUTPATIENT
Start: 2024-12-08 | End: 2024-12-08 | Stop reason: HOSPADM

## 2024-12-08 RX ORDER — CEFTRIAXONE 2 G/50ML
2 INJECTION, SOLUTION INTRAVENOUS ONCE
Status: COMPLETED | OUTPATIENT
Start: 2024-12-08 | End: 2024-12-08

## 2024-12-08 RX ORDER — TRAZODONE HYDROCHLORIDE 50 MG/1
100 TABLET ORAL NIGHTLY
Status: CANCELLED | OUTPATIENT
Start: 2024-12-08

## 2024-12-08 RX ORDER — ATORVASTATIN CALCIUM 10 MG/1
10 TABLET, FILM COATED ORAL NIGHTLY
Status: CANCELLED | OUTPATIENT
Start: 2024-12-08

## 2024-12-08 RX ORDER — CEFUROXIME AXETIL 500 MG/1
500 TABLET ORAL 2 TIMES DAILY
Qty: 8 TABLET | Refills: 0 | Status: SHIPPED | OUTPATIENT
Start: 2024-12-08 | End: 2024-12-12

## 2024-12-08 RX ORDER — METHOCARBAMOL 500 MG/1
500 TABLET, FILM COATED ORAL 3 TIMES DAILY PRN
Status: CANCELLED | OUTPATIENT
Start: 2024-12-08

## 2024-12-08 RX ORDER — ONDANSETRON 4 MG/1
4 TABLET, ORALLY DISINTEGRATING ORAL EVERY 8 HOURS PRN
Status: CANCELLED | OUTPATIENT
Start: 2024-12-08

## 2024-12-08 RX ORDER — PANTOPRAZOLE SODIUM 40 MG/1
40 TABLET, DELAYED RELEASE ORAL
Status: CANCELLED | OUTPATIENT
Start: 2024-12-09

## 2024-12-08 RX ORDER — CEFTRIAXONE 1 G/50ML
1 INJECTION, SOLUTION INTRAVENOUS EVERY 24 HOURS
Status: DISCONTINUED | OUTPATIENT
Start: 2024-12-09 | End: 2024-12-08 | Stop reason: HOSPADM

## 2024-12-08 RX ORDER — AZITHROMYCIN 500 MG/1
500 TABLET, FILM COATED ORAL
Status: DISCONTINUED | OUTPATIENT
Start: 2024-12-08 | End: 2024-12-08 | Stop reason: HOSPADM

## 2024-12-08 RX ORDER — AZITHROMYCIN 500 MG/1
500 TABLET, FILM COATED ORAL
Qty: 2 TABLET | Refills: 0 | Status: SHIPPED | OUTPATIENT
Start: 2024-12-08 | End: 2024-12-10

## 2024-12-08 RX ORDER — POLYETHYLENE GLYCOL 3350 17 G/17G
17 POWDER, FOR SOLUTION ORAL DAILY
Status: DISCONTINUED | OUTPATIENT
Start: 2024-12-08 | End: 2024-12-08 | Stop reason: HOSPADM

## 2024-12-08 RX ORDER — SERTRALINE HYDROCHLORIDE 50 MG/1
100 TABLET, FILM COATED ORAL DAILY
Status: CANCELLED | OUTPATIENT
Start: 2024-12-08

## 2024-12-08 RX ORDER — BUSPIRONE HYDROCHLORIDE 10 MG/1
10 TABLET ORAL 2 TIMES DAILY
Status: CANCELLED | OUTPATIENT
Start: 2024-12-08

## 2024-12-08 RX ORDER — GABAPENTIN 300 MG/1
300 CAPSULE ORAL NIGHTLY
Status: CANCELLED | OUTPATIENT
Start: 2024-12-08

## 2024-12-08 RX ADMIN — SODIUM CHLORIDE 2000 ML: 9 INJECTION, SOLUTION INTRAVENOUS at 01:50

## 2024-12-08 RX ADMIN — AZITHROMYCIN MONOHYDRATE 500 MG: 500 INJECTION, POWDER, LYOPHILIZED, FOR SOLUTION INTRAVENOUS at 02:26

## 2024-12-08 RX ADMIN — ENOXAPARIN SODIUM 40 MG: 40 INJECTION SUBCUTANEOUS at 09:05

## 2024-12-08 RX ADMIN — CEFTRIAXONE SODIUM 2 G: 2 INJECTION, SOLUTION INTRAVENOUS at 01:50

## 2024-12-08 SDOH — SOCIAL STABILITY: SOCIAL INSECURITY: ARE YOU OR HAVE YOU BEEN THREATENED OR ABUSED PHYSICALLY, EMOTIONALLY, OR SEXUALLY BY ANYONE?: NO

## 2024-12-08 SDOH — HEALTH STABILITY: MENTAL HEALTH
DO YOU FEEL STRESS - TENSE, RESTLESS, NERVOUS, OR ANXIOUS, OR UNABLE TO SLEEP AT NIGHT BECAUSE YOUR MIND IS TROUBLED ALL THE TIME - THESE DAYS?: NOT AT ALL

## 2024-12-08 SDOH — SOCIAL STABILITY: SOCIAL INSECURITY: DO YOU FEEL UNSAFE GOING BACK TO THE PLACE WHERE YOU ARE LIVING?: NO

## 2024-12-08 SDOH — SOCIAL STABILITY: SOCIAL INSECURITY: WITHIN THE LAST YEAR, HAVE YOU BEEN HUMILIATED OR EMOTIONALLY ABUSED IN OTHER WAYS BY YOUR PARTNER OR EX-PARTNER?: NO

## 2024-12-08 SDOH — ECONOMIC STABILITY: HOUSING INSECURITY: IN THE PAST 12 MONTHS, HOW MANY TIMES HAVE YOU MOVED WHERE YOU WERE LIVING?: 1

## 2024-12-08 SDOH — SOCIAL STABILITY: SOCIAL INSECURITY: DOES ANYONE TRY TO KEEP YOU FROM HAVING/CONTACTING OTHER FRIENDS OR DOING THINGS OUTSIDE YOUR HOME?: NO

## 2024-12-08 SDOH — ECONOMIC STABILITY: HOUSING INSECURITY: AT ANY TIME IN THE PAST 12 MONTHS, WERE YOU HOMELESS OR LIVING IN A SHELTER (INCLUDING NOW)?: NO

## 2024-12-08 SDOH — ECONOMIC STABILITY: HOUSING INSECURITY: IN THE LAST 12 MONTHS, WAS THERE A TIME WHEN YOU WERE NOT ABLE TO PAY THE MORTGAGE OR RENT ON TIME?: NO

## 2024-12-08 SDOH — ECONOMIC STABILITY: FOOD INSECURITY: HOW HARD IS IT FOR YOU TO PAY FOR THE VERY BASICS LIKE FOOD, HOUSING, MEDICAL CARE, AND HEATING?: NOT VERY HARD

## 2024-12-08 SDOH — SOCIAL STABILITY: SOCIAL INSECURITY: HAVE YOU HAD ANY THOUGHTS OF HARMING ANYONE ELSE?: NO

## 2024-12-08 SDOH — SOCIAL STABILITY: SOCIAL INSECURITY
WITHIN THE LAST YEAR, HAVE YOU BEEN RAPED OR FORCED TO HAVE ANY KIND OF SEXUAL ACTIVITY BY YOUR PARTNER OR EX-PARTNER?: NO

## 2024-12-08 SDOH — ECONOMIC STABILITY: FOOD INSECURITY: WITHIN THE PAST 12 MONTHS, THE FOOD YOU BOUGHT JUST DIDN'T LAST AND YOU DIDN'T HAVE MONEY TO GET MORE.: NEVER TRUE

## 2024-12-08 SDOH — SOCIAL STABILITY: SOCIAL INSECURITY: HAVE YOU HAD THOUGHTS OF HARMING ANYONE ELSE?: NO

## 2024-12-08 SDOH — SOCIAL STABILITY: SOCIAL INSECURITY
WITHIN THE LAST YEAR, HAVE YOU BEEN KICKED, HIT, SLAPPED, OR OTHERWISE PHYSICALLY HURT BY YOUR PARTNER OR EX-PARTNER?: NO

## 2024-12-08 SDOH — HEALTH STABILITY: MENTAL HEALTH: HOW OFTEN DO YOU HAVE SIX OR MORE DRINKS ON ONE OCCASION?: NEVER

## 2024-12-08 SDOH — ECONOMIC STABILITY: FOOD INSECURITY: WITHIN THE PAST 12 MONTHS, YOU WORRIED THAT YOUR FOOD WOULD RUN OUT BEFORE YOU GOT THE MONEY TO BUY MORE.: NEVER TRUE

## 2024-12-08 SDOH — SOCIAL STABILITY: SOCIAL INSECURITY: HAS ANYONE EVER THREATENED TO HURT YOUR FAMILY OR YOUR PETS?: NO

## 2024-12-08 SDOH — ECONOMIC STABILITY: INCOME INSECURITY: IN THE PAST 12 MONTHS HAS THE ELECTRIC, GAS, OIL, OR WATER COMPANY THREATENED TO SHUT OFF SERVICES IN YOUR HOME?: NO

## 2024-12-08 SDOH — SOCIAL STABILITY: SOCIAL INSECURITY: WITHIN THE LAST YEAR, HAVE YOU BEEN AFRAID OF YOUR PARTNER OR EX-PARTNER?: NO

## 2024-12-08 SDOH — SOCIAL STABILITY: SOCIAL INSECURITY: DO YOU FEEL ANYONE HAS EXPLOITED OR TAKEN ADVANTAGE OF YOU FINANCIALLY OR OF YOUR PERSONAL PROPERTY?: NO

## 2024-12-08 SDOH — HEALTH STABILITY: MENTAL HEALTH: HOW OFTEN DO YOU HAVE A DRINK CONTAINING ALCOHOL?: NEVER

## 2024-12-08 SDOH — HEALTH STABILITY: MENTAL HEALTH: HOW MANY DRINKS CONTAINING ALCOHOL DO YOU HAVE ON A TYPICAL DAY WHEN YOU ARE DRINKING?: PATIENT DOES NOT DRINK

## 2024-12-08 SDOH — SOCIAL STABILITY: SOCIAL INSECURITY: ARE THERE ANY APPARENT SIGNS OF INJURIES/BEHAVIORS THAT COULD BE RELATED TO ABUSE/NEGLECT?: NO

## 2024-12-08 SDOH — ECONOMIC STABILITY: TRANSPORTATION INSECURITY: IN THE PAST 12 MONTHS, HAS LACK OF TRANSPORTATION KEPT YOU FROM MEDICAL APPOINTMENTS OR FROM GETTING MEDICATIONS?: NO

## 2024-12-08 SDOH — SOCIAL STABILITY: SOCIAL INSECURITY: ABUSE: ADULT

## 2024-12-08 SDOH — SOCIAL STABILITY: SOCIAL INSECURITY: WERE YOU ABLE TO COMPLETE ALL THE BEHAVIORAL HEALTH SCREENINGS?: YES

## 2024-12-08 ASSESSMENT — ENCOUNTER SYMPTOMS
CONSTITUTIONAL NEGATIVE: 1
EYES NEGATIVE: 1
PALPITATIONS: 1
ENDOCRINE NEGATIVE: 1
PSYCHIATRIC NEGATIVE: 1
GASTROINTESTINAL NEGATIVE: 1
SHORTNESS OF BREATH: 1
HEMATOLOGIC/LYMPHATIC NEGATIVE: 1
NEUROLOGICAL NEGATIVE: 1
MYALGIAS: 1
DYSPNEA ON EXERTION: 1
BACK PAIN: 1

## 2024-12-08 ASSESSMENT — COGNITIVE AND FUNCTIONAL STATUS - GENERAL
MOBILITY SCORE: 24
DAILY ACTIVITIY SCORE: 24
MOBILITY SCORE: 23
DAILY ACTIVITIY SCORE: 24
CLIMB 3 TO 5 STEPS WITH RAILING: A LITTLE
PATIENT BASELINE BEDBOUND: NO

## 2024-12-08 ASSESSMENT — PATIENT HEALTH QUESTIONNAIRE - PHQ9
1. LITTLE INTEREST OR PLEASURE IN DOING THINGS: NOT AT ALL
2. FEELING DOWN, DEPRESSED OR HOPELESS: NOT AT ALL
SUM OF ALL RESPONSES TO PHQ9 QUESTIONS 1 & 2: 0

## 2024-12-08 ASSESSMENT — ACTIVITIES OF DAILY LIVING (ADL)
TOILETING: INDEPENDENT
GROOMING: INDEPENDENT
ADEQUATE_TO_COMPLETE_ADL: YES
HEARING - RIGHT EAR: FUNCTIONAL
JUDGMENT_ADEQUATE_SAFELY_COMPLETE_DAILY_ACTIVITIES: YES
FEEDING YOURSELF: INDEPENDENT
ASSISTIVE_DEVICE: NONE;CANE
LACK_OF_TRANSPORTATION: NO
PATIENT'S MEMORY ADEQUATE TO SAFELY COMPLETE DAILY ACTIVITIES?: YES
LACK_OF_TRANSPORTATION: NO
HEARING - LEFT EAR: DIFFICULTY WITH NOISE
WALKS IN HOME: INDEPENDENT
DRESSING YOURSELF: INDEPENDENT
BATHING: INDEPENDENT

## 2024-12-08 ASSESSMENT — LIFESTYLE VARIABLES
AUDIT-C TOTAL SCORE: 1
SKIP TO QUESTIONS 9-10: 1
HOW OFTEN DO YOU HAVE A DRINK CONTAINING ALCOHOL: MONTHLY OR LESS
SKIP TO QUESTIONS 9-10: 1
HOW MANY STANDARD DRINKS CONTAINING ALCOHOL DO YOU HAVE ON A TYPICAL DAY: PATIENT DOES NOT DRINK
HOW OFTEN DO YOU HAVE 6 OR MORE DRINKS ON ONE OCCASION: NEVER
PRESCIPTION_ABUSE_PAST_12_MONTHS: NO
AUDIT-C TOTAL SCORE: 0
AUDIT-C TOTAL SCORE: 1
SUBSTANCE_ABUSE_PAST_12_MONTHS: NO

## 2024-12-08 ASSESSMENT — PAIN SCALES - GENERAL: PAINLEVEL_OUTOF10: 0 - NO PAIN

## 2024-12-08 ASSESSMENT — PAIN - FUNCTIONAL ASSESSMENT: PAIN_FUNCTIONAL_ASSESSMENT: 0-10

## 2024-12-08 NOTE — H&P
Internal Medicine History and Physical Note   Patient: Gisselle Avalos     Age: 54 y.o.     SEX: female     MRN: [id number]      ROOM: [address] [place], [place] [address]     Code: Full Code  Admitted On: 12/7/2024     PCP: Alex Crowley DO          Attending: Tc Rodriguez MD    Subjective:      Chief Complaint   Patient presents with    Shortness of Breath     History of Presenting Illness:    Gisselle Avalos is a 54 y.o. female with a Medical History of THIERRY [not currently on CPAP], anxiety, GERD, lumbar radiculopathy who presented on account of shortness of breath.Two weeks ago, patient starting having dyspnea, she described shortness of breath  with exertion, progressively worsened with any form of movement with occasional palpitations. At first she didn't think much of her symptoms until yesterday afternoon when she started having left sided shoulder pain. Describes pain sharp intermitted, located in her left shoulder, she describes pain as sharp, intermittent, non radiation, with no aggravating or relieving factors.   Patient also reports having bilateral ankle edema for several weeks. She denies HA, FND, changes in speech or vision. No lightheadedness, no nausea/vomiting, no changes in bowel/urinary habits. No recent travels, exposure to sick contact. She reports a family hx of CAD in father. Upon arrival to the ED she was noted to be in SVT, which was aborted with use of adenosine after failed trial of valsava. Initially presentation int he ED showed that she was hypertensive, but upon further discussion with patient, she says that she is always hypotensive. She also said that her shoulder pain has resolved since coming to the ED. She has a hx of THIERRY, but has not been able to get a cpap.  Cardiology consult was placed in the ED.     ED course:   Vitals  T 37.1  RR 14 BP 93/71 SPO2 96% on room air    Labs  CBC: WBC 11.8  CMP: Unremarkable  Troponin: 5-6  Lactate: 0.5  TSH 3.77    Imaging  CT angio;  Nonspecific diffuse interseptal thickening and mosaic attenuation of the lung fields with dependent predominance, which may be seen with interstitial infectious or inflammatory process or interstitial edema. There is also mildly prominent right hilar lymph node and multiple solid pulmonary nodules in the right lung measuring up to 1.1 cm in the basilar right lower lobe. These findings may be seen in the setting of inflammatory process such as sarcoidosis, however are entirely nonspecific.    Microbiology  Blood cx: Collected  Covid/flu: N/A  UA: N/A    Intervention    Ceftriaxone, azithromycin, 3 L NS bolus    ROS: 12 points review of system is negative except as stated in the HPI above.      Past Medical History:    Past Medical History:  01/26/2024: Acute bronchitis  08/07/2008: Acute sinusitis  06/18/2009: Backache  09/02/2021: Contracture of muscle, left upper arm      Comment:  Contracture of muscle of left upper arm  09/19/2022: Dog bite of lower leg  09/02/2021: Encounter for screening for malignant neoplasm of colon      Comment:  Colon cancer screening  09/02/2021: Encounter for screening for malignant neoplasm of colon      Comment:  Colon cancer screening  06/23/2022: Other specified abnormal findings of blood chemistry      Comment:  D-dimer, elevated  04/15/2022: Pain in right knee      Comment:  Right knee pain  07/22/2022: Person with feared health complaint in whom no diagnosis   is made      Comment:  Concern about cardiovascular disease without diagnosis  09/02/2021: Personal history of other medical treatment      Comment:  History of screening mammography  07/25/2022: Urinary tract infection, site not specified      Comment:  Acute UTI   Allergies   Allergen Reactions    Amitriptyline Insomnia    Penicillins Unknown     able to take amoxicillin      Medications Prior to Admission   Medication Sig Dispense Refill Last Dose/Taking    atorvastatin (Lipitor) 10 mg tablet Take 1 tablet (10 mg) by  mouth once daily at bedtime. 100 tablet 1     busPIRone (Buspar) 10 mg tablet Take 1 tablet (10 mg) by mouth 2 times a day. 180 tablet 1     diazePAM (Valium) 5 mg tablet TAKE 1-2 TABS PO ONE HOUR PRIOR TO PROCEDURE. MAY REPEAT UPON ARRIVAL TO PROCEDURE 4 tablet 0     gabapentin (Neurontin) 100 mg capsule Take 1 capsule (100 mg) by mouth 3 times a day. (Patient taking differently: Take 3 capsules (300 mg) by mouth once daily at bedtime.) 270 capsule 1     gabapentin (Neurontin) 300 mg capsule TAKE 2 CAPSULES BY MOUTH AT BEDTIME FOR 7 DAYS, THEN TAKE 3 CAPSULES BY MOUTH AT BEDTIME AND CONTINUE ON THIS DOSE. CALL OFFICE FOR REFILLS. 35 capsule 0     MAGNESIUM GLYCINATE ORAL Take 2 capsules by mouth once daily at bedtime.       methocarbamol (Robaxin) 500 mg tablet Take 1 tablet (500 mg) by mouth 3 times a day as needed for muscle spasms. As needed for pain 30 tablet 2     omeprazole (PriLOSEC) 40 mg DR capsule Take 1 capsule (40 mg) by mouth once daily. 90 capsule 1     ondansetron ODT (Zofran-ODT) 4 mg disintegrating tablet Take 1 tablet (4 mg) by mouth every 8 hours if needed for nausea or vomiting. 30 tablet 2     sertraline (Zoloft) 100 mg tablet TAKE 1 TABLET ONE TIME DAILY 90 tablet 0     traZODone (Desyrel) 100 mg tablet Take 1 tablet (100 mg) by mouth once daily at bedtime. 90 tablet 1      Past surgical history:      Past Surgical History:   Procedure Laterality Date    CHOLECYSTECTOMY      HYSTERECTOMY      OTHER SURGICAL HISTORY  09/02/2021    Gallbladder surgery    OTHER SURGICAL HISTORY  09/02/2021    Hysterectomy      Social history:      Social History     Socioeconomic History    Marital status:      Spouse name: carlos   Tobacco Use    Smoking status: Every Day     Current packs/day: 1.00     Average packs/day: 1 pack/day for 40.9 years (40.9 ttl pk-yrs)     Types: Cigarettes     Start date: 1/1/1984    Smokeless tobacco: Never   Vaping Use    Vaping status: Never Used   Substance and Sexual  Activity    Alcohol use: Yes     Comment: 1a month    Drug use: Not Currently     Types: Marijuana     Comment: in the 80's    Sexual activity: Yes     Social Drivers of Health     Financial Resource Strain: Low Risk  (12/8/2024)    Overall Financial Resource Strain (CARDIA)     Difficulty of Paying Living Expenses: Not very hard   Food Insecurity: No Food Insecurity (12/8/2024)    Hunger Vital Sign     Worried About Running Out of Food in the Last Year: Never true     Ran Out of Food in the Last Year: Never true   Transportation Needs: No Transportation Needs (12/8/2024)    PRAPARE - Transportation     Lack of Transportation (Medical): No     Lack of Transportation (Non-Medical): No   Stress: No Stress Concern Present (12/8/2024)    [place] [facility] [employer] [facility] - Occupational Stress Questionnaire     Feeling of Stress : Not at all   Intimate Partner Violence: Not At Risk (12/8/2024)    Humiliation, Afraid, Rape, and Kick questionnaire     Fear of Current or Ex-Partner: No     Emotionally Abused: No     Physically Abused: No     Sexually Abused: No   Housing Stability: Low Risk  (12/8/2024)    Housing Stability Vital Sign     Unable to Pay for Housing in the Last Year: No     Number of Times Moved in the Last Year: 1     Homeless in the Last Year: No      Social Drivers of Health with Concerns     Tobacco Use: High Risk (12/8/2024)    Patient History     Smoking Tobacco Use: Every Day     Smokeless Tobacco Use: Never     Passive Exposure: Not on file   Physical Activity: Not on file   Social Connections: Not on file   Digital Equity: Not on file   Health Literacy: Not on file      Current medications:      Current Facility-Administered Medications:     azithromycin (Zithromax) tablet 500 mg, 500 mg, oral, q24h [facility], Regis Ring MD    cefTRIAXone (Rocephin) 1 g in dextrose (iso) IV 50 mL, 1 g, intravenous, q24h, Regis Ring MD    enoxaparin (Lovenox) syringe 40 mg, 40 mg, subcutaneous, q24h,  Regis Ring MD    polyethylene glycol (Glycolax, Miralax) packet 17 g, 17 g, oral, Daily, Regis Ring MD    Objective finding:    Vitals:   Vitals:    12/08/24 0230 12/08/24 0300 12/08/24 0322 12/08/24 0338   BP: (!) 139/111 (!) 84/47 104/58 110/69   BP Location:    Left arm   Patient Position:    Sitting   Pulse: (!) 103 100 (!) 102 96   Resp: 11 14 20    Temp: 36 °C (96.8 °F) 36 °C (96.8 °F) 36 °C (96.8 °F)    TempSrc:       SpO2: 97% 96% 95% 98%   Weight:       Height:          I/Os:   Intake/Output Summary (Last 24 hours) at 12/8/2024 0539  Last data filed at 12/8/2024 0502  Gross per 24 hour   Intake 2565.1 ml   Output --   Net 2565.1 ml       Physical Examination:  GE: lying comfortable in bed, in NAD, acutely ill looking, obese  HEENT; AT/NC, EOMI, no conjunctival pallor, anicteric sclera, moist mucous membrane  Neck; supple neck, no LAD/JVD  Chest; Good air entry b/l, no adventitious sounds heard, normal rise and fall of thoracis cavity during each respiratory cycle.  CVS; s1 and s2, no MGR, RRR  Abd; soft, NT/ND, +BS, no organomegaly, No guarding/rebound tenderness  Ext; no pedal edema/cyanosis/clubbing, pulses intact  Neuro; Aox3, Cn 2-12 intact, sensation intact, Motor 5/5 globally  Psych; normal mood/affect.       Laboratory finding:    Laboratory results:  Results from last 72 hours   Lab Units 12/07/24  2312   SODIUM mmol/L 138   POTASSIUM mmol/L 3.9   CHLORIDE mmol/L 105   CO2 mmol/L 22   BUN mg/dL 17   CREATININE mg/dL 0.91   GLUCOSE mg/dL 111*   CALCIUM mg/dL 9.1   ANION GAP mmol/L 15   EGFR mL/min/1.73m*2 74      Results from last 72 hours   Lab Units 12/07/24  2312   WBC AUTO x10*3/uL 11.8*   HEMOGLOBIN g/dL 14.3   HEMATOCRIT % 42.8   MCV fL 90   PLATELETS AUTO x10*3/uL 354   NEUTROS PCT AUTO % 55.8   LYMPHS PCT AUTO % 35.6   MONOS PCT AUTO % 6.3   EOS PCT AUTO % 1.7      Other laboratory finding:   Lab Results   Component Value Date    CALCIUM 9.1 12/07/2024      Results from last  "72 hours   Lab Units 12/08/24  0025 12/07/24  2312   TROPHS ng/L 6 5     No results found for: \"CRP\", \"BNP\"   Imaging:   CT angio chest for pulmonary embolism  Narrative: Interpreted By:  Paul Keith,   STUDY:  CT ANGIO CHEST FOR PULMONARY EMBOLISM;  12/7/2024 11:54 pm      INDICATION:  Signs/Symptoms:sob and tachy.      COMPARISON:  Correlation made with CT thoracic spine.      ACCESSION NUMBER(S):  [id number]      ORDERING CLINICIAN:  SCOTT ANGELA      TECHNIQUE:  Helical data acquisition of the chest was obtained contrast volume:  81 mL IV contrast Omnipaque 350.      Axial contiguous images were reformatted in coronal and sagittal  planes. Axial and coronal MIP images were created and reviewed.      FINDINGS:  POTENTIAL LIMITATIONS OF THE STUDY: Motion and mixing artifact which  limits evaluation of the distal branch vessels.      HEART AND VESSELS:  No discrete filling defects within the main pulmonary artery or its  branches.      Main pulmonary artery and its branches are normal in caliber.      The thoracic aorta is of normal course and caliber.      No coronary artery calcifications are seen.The study is not optimized  for evaluation of coronary arteries.      The cardiac chambers are not enlarged.      No evidence of pericardial effusion.      MEDIASTINUM AND LUCIUS, LOWER NECK AND AXILLA:  The visualized thyroid gland is within normal limits.      There is prominent right hilar lymph node measuring 1.6 cm on axial  image 99, nonspecific. Otherwise no mediastinal lymphadenopathy.      Esophagus appears within normal limits as seen.      LUNGS AND AIRWAYS:  The trachea and central airways are patent. No endobronchial lesion.  Nonspecific diffuse interseptal thickening and mosaic attenuation of  the lung fields which may be seen in the setting of interstitial  infectious or inflammatory process or in the setting of pulmonary  edema. No focal consolidation. There is suggestion of slightly  increasing solid " "pulmonary nodule in the basilar right lower lobe  measuring 1.1 cm. There is also a 6 mm right upper lobe pulmonary  nodule on axial image 79 and additional 5 mm solid pulmonary nodule  in the anterior right upper lobe on image 104.. There is also  unchanged 5 mm pulmonary nodule along the right major fissure in the  lower lobe on axial image 109.      UPPER ABDOMEN:  There is hepatomegaly. Cholecystectomy change.      CHEST WALL AND OSSEOUS STRUCTURES:  There are no suspicious osseous lesions. The visualized osseous  structures are intact. Mild endplate degenerative change in the lower  thoracic spine.      Impression: 1.  No evidence of pulmonary emboli.  2. Nonspecific diffuse interseptal thickening and mosaic attenuation  of the lung fields with dependent predominance, which may be seen  with interstitial infectious or inflammatory process or interstitial  edema. There is also mildly prominent right hilar lymph node and  multiple solid pulmonary nodules in the right lung measuring up to  1.1 cm in the basilar right lower lobe. These findings may be seen in  the setting of inflammatory process such as sarcoidosis, however are  entirely nonspecific. Pulmonology follow-up and chest CT or PET-CT in  3 months is recommended.          Signed by: Paul Keith 12/8/2024 12:50 AM  Dictation workstation:   [id number]     Microbiology:   No results found for the last 90 days.    No results found for: \"URINECULTURE\", \"BLOODCULT\", \"CSFCULTSMEAR\"                 No lab exists for component: \"AGALPCRNB\"   Assessment and Plan:    Problem list:   Principal Problem:    Chest pain, unspecified type    Assessment and Plan:   Gisselle Avalos is a 54 y.o. female with a Medical History of THIERRY [not currently on CPAP], anxiety, GERD, lumbar radiculopathy who presented on account of shortness of breath.imaging studies in the ED diffused infilterates and nodulules c/f infective/inflam process.     Active Issues:  #Dsypnea / " shoulder pain  # SVT   Initial EKGs: SVT  CT angio; Nonspecific diffuse interseptal thickening and mosaic attenuation of the lung fields with dependent predominance, which may be seen with interstitial infectious or inflammatory process or interstitial edema. There is also mildly prominent right hilar lymph node and multiple solid pulmonary nodules in the right lung measuring up to 1.1 cm in the basilar right lower lobe. These findings may be seen in the setting of inflammatory process such as sarcoidosis, however are entirely nonspecific.  Sp adenosine. Ceftriaxone, azithromycin, 3 L NS bolus  Initial troponin 5-6,    Telemetry  Supplemental oxygen as needed to ensure O2 saturation > 90%  continue Vital Signs Q8hr,   Pain assessment Q8hr  Priority discharge echocardiogram ordered to assess cardiovascular status  Labs ordered: Repeat troponins, Lipid panel, HbA1c, BNP  Cardiology consult, appre recs  Covid/flu    Pulmonary nodule  Cf for sarcoidosis vs CAP  Consider pulm consult for further eval of pulm nodules  Cont Ceftriaxone  Continue azithromycin  Legionella urine ag  Strep urine ag  Covid test/ flu pcr  MRSA  Procalcitonin    Chronic Issues:  THIERRY [not currently on CPAP]l o2 as needed   HLD: lipitor 10  Anxiety: Sertraline, trazodone  GERD: Omeprazole 40  lumbar radiculopathy: Gabapentin 300 Robaxin 500    F: as needed   E: Replete as needed  N: Cardiac  G: Pantoprazole  VTE: Lovenox    Code:   Full Code  Extended Emergency Contact Information  Primary Emergency Contact: Darío Avalos  Address: [address] [place], [place] [id number] [facility]  Home Phone: [phone]  Mobile Phone: [phone]  Relation: Spouse   needed? No  Secondary Emergency Contact: Trudy Bangura  Mobile Phone: [phone]  Relation: Daughter  Preferred language: English   needed? No     Disposition: Inpatient admission, will anticipate < 48hrs eLOS.     Regis Ring MD   Internal  Medicine Resident  PGY 3

## 2024-12-08 NOTE — CARE PLAN
The patient's goals for the shift include  discharge    The clinical goals for the shift include pt will discharge home      Problem: Safety - Adult  Goal: Free from fall injury  Outcome: Progressing     Problem: Discharge Planning  Goal: Discharge to home or other facility with appropriate resources  Outcome: Progressing

## 2024-12-08 NOTE — ED NOTES
Patient came into the ED due to she has been having shortness of breath and a severe headache.  Her heart rate was in the 180's upon arrival.  A LifePak was put on the patient.       Osmar Branham RN  12/07/24 1467

## 2024-12-08 NOTE — DISCHARGE INSTRUCTIONS
Please, take your home medications as instructed after being discharged from the hospital.     NEW MEDICATIONS:   azithromycin 500 mg tablet; Commonly known as: Zithromax; Take 1 tablet   (500 mg) by mouth once every 24 hours for 2 days.   cefuroxime 500 mg tablet; Commonly known as: Ceftin; Take 1 tablet (500   mg) by mouth 2 times a day for 4 days    OTHER INSTRUCTIONS:  -Please follow up with Cardiology for Stress Testing/work up & Heart Monitor  -Please follow up with Pulmonology for Lung Nodule work up  -Please follow up with PCP    UPCOMING APPOINTMENTS:     Please, follow-up with your PCP, Cardiology, & Pulmonology within 7 to 14 days after leaving the hospital. /appointment services has been requested to make an appointment for you, however if you do not hear back from them within 1 to 2 days, please call your primary physician's office to schedule an appointment. Bring your photo ID and insurance card to your appointment.   [facility]  services can be reached at [phone].     If you experience any worsening symptoms or have any concerns, please contact your primary care provider to schedule an appointment. If you cannot get in touch with your primary care physician, please return to the nearest emergency room or urgent care clinic for an evaluation and treatment.     Thank you for choosing [facility] and allowing us to partake in your medical care!     - [facility] inpatient primary care team.

## 2024-12-08 NOTE — ED TRIAGE NOTES
Patient presents with SOB, left stabbing shoulder pain, mild midsternal chest pain, and dizziness. Symptoms began yesterday and worsened today

## 2024-12-08 NOTE — CONSULTS
Consults  History Of Present Illness:    Gisselle Avalos is a very pleasant 54 year old female with a history of THIERRY, anxiety, GERD and lumbar radiculopathy, presented to the ER with shortness of breath, progressively worse on exertion. Complains of chest tightness will notice it at rest. She states for several years has had episodes of heart palpitations, states she was told she had panic attacks. States she needs to go home today. In the ER had an episode of SVT was given Adenosine and converted to sinus rhythm. Currently being treated for pneumonia.     Review of Systems   Constitutional: Negative.   HENT: Negative.     Eyes: Negative.    Cardiovascular:  Positive for dyspnea on exertion and palpitations.   Respiratory:  Positive for shortness of breath.    Endocrine: Negative.    Hematologic/Lymphatic: Negative.    Skin: Negative.    Musculoskeletal:  Positive for back pain and myalgias.   Gastrointestinal: Negative.    Neurological: Negative.    Psychiatric/Behavioral: Negative.           Last Recorded Vitals:  Vitals:    12/08/24 0230 12/08/24 0300 12/08/24 0322 12/08/24 0338   BP: (!) 139/111 (!) 84/47 104/58 110/69   BP Location:    Left arm   Patient Position:    Sitting   Pulse: (!) 103 100 (!) 102 96   Resp: 11 14 20    Temp: 36 °C (96.8 °F) 36 °C (96.8 °F) 36 °C (96.8 °F)    TempSrc:       SpO2: 97% 96% 95% 98%   Weight:       Height:           Last Labs:  CBC - 12/8/2024:  6:08 AM  7.1 12.9 295    39.6      CMP - 12/8/2024:  6:08 AM  8.4 6.4 20 --- 0.3   3.6 3.5 26 54      PTT - No results in last year.  _   _ _     Troponin I, High Sensitivity   Date/Time Value Ref Range Status   12/08/2024 12:25 AM 6 0 - 13 ng/L Final   12/07/2024 11:12 PM 5 0 - 13 ng/L Final     BNP   Date/Time Value Ref Range Status   12/08/2024 06:08 AM 42 0 - 99 pg/mL Final     Hemoglobin A1C   Date/Time Value Ref Range Status   12/08/2024 06:07 AM 5.7 (H) See comment % Final     LDL Calculated   Date/Time Value Ref Range  Status   12/08/2024 06:08  (H) <=99 mg/dL Final     Comment:                                 Near   Borderline      AGE      Desirable  Optimal    High     High     Very High     0-19 Y     0 - 109     ---    110-129   >/= 130     ----    20-24 Y     0 - 119     ---    120-159   >/= 160     ----      >24 Y     0 -  99   100-129  130-159   160-189     >/=190     04/04/2024 08:56  (H) <=99 mg/dL Final     Comment:                                 Near   Borderline      AGE      Desirable  Optimal    High     High     Very High     0-19 Y     0 - 109     ---    110-129   >/= 130     ----    20-24 Y     0 - 119     ---    120-159   >/= 160     ----      >24 Y     0 -  99   100-129  130-159   160-189     >/=190       VLDL   Date/Time Value Ref Range Status   12/08/2024 06:08 AM 24 0 - 40 mg/dL Final   04/04/2024 08:56 AM 28 0 - 40 mg/dL Final   09/02/2021 02:55 PM 27 0 - 40 mg/dL Final      Last I/O:  I/O last 3 completed shifts:  In: 2565.1 (26.9 mL/kg) [IV Piggyback:2565.1]  Out: - (0 mL/kg)   Weight: 95.3 kg     Past Cardiology Tests (Last 3 Years):  EKG:  ECG NSR    Echo:  Transthoracic Echo Complete 04/16/2024  1. Left ventricular systolic function is normal with a 55% estimated ejection fraction.   2. RVSP within normal limits.  Ejection Fractions:  LVEF 55%   Cath:    Stress Test:    Cardiac Imaging:      Past Medical History:  She has a past medical history of Acute bronchitis (01/26/2024), Acute sinusitis (08/07/2008), Backache (06/18/2009), Contracture of muscle, left upper arm (09/02/2021), Dog bite of lower leg (09/19/2022), Encounter for screening for malignant neoplasm of colon (09/02/2021), Encounter for screening for malignant neoplasm of colon (09/02/2021), Other specified abnormal findings of blood chemistry (06/23/2022), Pain in right knee (04/15/2022), Person with feared health complaint in whom no diagnosis is made (07/22/2022), Personal history of other medical treatment (09/02/2021), and  Urinary tract infection, site not specified (07/25/2022).    Past Surgical History:  She has a past surgical history that includes Other surgical history (09/02/2021); Other surgical history (09/02/2021); Cholecystectomy; and Hysterectomy.      Social History:  She reports that she has been smoking cigarettes. She started smoking about 40 years ago. She has a 40.9 pack-year smoking history. She has never used smokeless tobacco. She reports current alcohol use. She reports that she does not currently use drugs after having used the following drugs: Marijuana.    Family History:  Family History   Problem Relation Name Age of Onset    Breast cancer Mother Netta bain     Arthritis Mother Netta bain     Asthma Mother Netta bain     Hearing loss Father Richard bain     Cancer Father Richard bain         unknown cancer type    Coronary artery disease Father Richard bain 52    Brain cancer Sister Nguyen     Heart disease Sister Nguyen     Breast cancer Sister Kely     Hypertension Sister Kely     Prostate cancer Brother x3     Diabetes Brother x3     Sleep apnea Daughter      Restless legs syndrome Daughter          Allergies:  Amitriptyline and Penicillins    Inpatient Medications:  Scheduled medications   Medication Dose Route Frequency    azithromycin  500 mg oral q24h [facility]    [START ON 12/9/2024] cefTRIAXone  1 g intravenous q24h    enoxaparin  40 mg subcutaneous q24h    polyethylene glycol  17 g oral Daily     PRN medications   Medication     Continuous Medications   Medication Dose Last Rate     Outpatient Medications:  Current Outpatient Medications   Medication Instructions    atorvastatin (LIPITOR) 10 mg, oral, Nightly    busPIRone (BUSPAR) 10 mg, oral, 2 times daily    diazePAM (Valium) 5 mg tablet TAKE 1-2 TABS PO ONE HOUR PRIOR TO PROCEDURE. MAY REPEAT UPON ARRIVAL TO PROCEDURE    gabapentin (Neurontin) 300 mg capsule TAKE 2 CAPSULES BY MOUTH AT BEDTIME FOR 7 DAYS, THEN TAKE 3 CAPSULES BY MOUTH AT  BEDTIME AND CONTINUE ON THIS DOSE. CALL OFFICE FOR REFILLS.    gabapentin (NEURONTIN) 100 mg, oral, 3 times daily    MAGNESIUM GLYCINATE ORAL 2 capsules, oral, Nightly    methocarbamol (ROBAXIN) 500 mg, oral, 3 times daily PRN, As needed for pain    omeprazole (PRILOSEC) 40 mg, oral, Daily RT    ondansetron ODT (ZOFRAN-ODT) 4 mg, oral, Every 8 hours PRN    sertraline (ZOLOFT) 100 mg, oral, Daily    traZODone (DESYREL) 100 mg, oral, Nightly       Physical Exam:  Physical Exam  Vitals reviewed.   HENT:      Head: Normocephalic.      Nose: Nose normal.   Eyes:      Pupils: Pupils are equal, round, and reactive to light.   Cardiovascular:      Rate and Rhythm: Normal rate and regular rhythm.   Pulmonary:      Effort: Pulmonary effort is normal.      Breath sounds: Normal breath sounds.   Abdominal:      General: Abdomen is flat.      Palpations: Abdomen is soft.   Musculoskeletal:         General: Normal range of motion.      Cervical back: Normal range of motion.   Skin:     General: Skin is warm and dry.   Neurological:      General: No focal deficit present.      Mental Status: He is alert and oriented to person, place, and time.   Psychiatric:         Mood and Affect: Mood normal.          Assessment/Plan   Mrs. Avalos is a very pleasant 54 year old female with a history of THIERRY, anxiety and GERD, presented with progressive dyspnea. Troponin negative, BNP was normal. CT showed inflammation of her lungs, currently being treated for pneumonia. She had an episode of SVT converted with Adenosine. She may have had episodes of SVT in the past, recommend a monitor as an outpatient. Complains of intermittent chest tightness     Plan   -Outpatient heart monitor   -Nuclear stress test, patient states she would like to go home, it can be done as an outpatient   -primary team treating pneumonia   -echocardiogram 4/2024 showed a preserved LV function   -continuous heart monitor        Code Status:  Full Code    I spent   minutes in the professional and overall care of this patient.        Loren Guerra, APRN-CNP

## 2024-12-08 NOTE — DISCHARGE SUMMARY
Discharge Diagnosis  Chest pain, unspecified type    Issues Requiring Follow-Up  Palpitations, Chest Discomfort, SVT  Concern for PNA  Pulmonary Nodules on CT  THIERRY (No CPAP)  HLD   Anxiety  GERD   Lumbar Radiculopathy     Discharge Meds     Medication List      START taking these medications     azithromycin 500 mg tablet; Commonly known as: Zithromax; Take 1 tablet   (500 mg) by mouth once every 24 hours for 2 days.   cefuroxime 500 mg tablet; Commonly known as: Ceftin; Take 1 tablet (500   mg) by mouth 2 times a day for 4 days.     CHANGE how you take these medications     gabapentin 100 mg capsule; Commonly known as: Neurontin; Take 1 capsule   (100 mg) by mouth 3 times a day.; What changed: Another medication with   the same name was removed. Continue taking this medication, and follow the   directions you see here.     CONTINUE taking these medications     atorvastatin 10 mg tablet; Commonly known as: Lipitor; Take 1 tablet (10   mg) by mouth once daily at bedtime.   busPIRone 10 mg tablet; Commonly known as: Buspar; Take 1 tablet (10 mg)   by mouth 2 times a day.   diazePAM 5 mg tablet; Commonly known as: Valium; TAKE 1-2 TABS PO ONE   HOUR PRIOR TO PROCEDURE. MAY REPEAT UPON ARRIVAL TO PROCEDURE   MAGNESIUM GLYCINATE ORAL   methocarbamol 500 mg tablet; Commonly known as: Robaxin; Take 1 tablet   (500 mg) by mouth 3 times a day as needed for muscle spasms. As needed for   pain   omeprazole 40 mg DR capsule; Commonly known as: PriLOSEC; Take 1 capsule   (40 mg) by mouth once daily.   sertraline 100 mg tablet; Commonly known as: Zoloft; TAKE 1 TABLET ONE   TIME DAILY   traZODone 100 mg tablet; Commonly known as: Desyrel; Take 1 tablet (100   mg) by mouth once daily at bedtime.     STOP taking these medications     ondansetron ODT 4 mg disintegrating tablet; Commonly known as:   Zofran-ODT       Test Results Pending At Discharge  Pending Labs       Order Current Status    Procalcitonin In process     Staphylococcus aureus/MRSA colonization, Culture In process    Blood Culture Preliminary result    Blood Culture Preliminary result            Hospital Course  Gisselle Avalos is a 54 y.o. female with a Medical History of THIERRY [not currently on CPAP], anxiety, GERD, lumbar radiculopathy who presented on account of shortness of breath.Two weeks ago, patient starting having dyspnea, she described shortness of breath  with exertion, progressively worsened with any form of movement with occasional palpitations. Yesterday afternoon she started having left sided shoulder pain. Describes pain sharp intermitted, located in her left shoulder, she describes pain as sharp, intermittent, non radiation, with no aggravating or relieving factors. Also notes having bilateral ankle edema for several weeks.     She denies HA, FND, changes in speech/vision, lightheadedness, nausea/vomiting, changes in bowel/urinary habits, recent travels, exposure to sick contact.     Upon arrival to the ED she was noted to be in SVT, which was aborted with use of adenosine after failed trial of valsava. Initially presentation int he ED showed that she was hypertensive, but upon further discussion with patient, she says that she is always hypotensive. She also said that her shoulder pain has resolved since coming to the ED. She has a hx of THIERRY, but has not been able to get a cpap.      ED course:   Vitals  T 37.1  RR 14 BP 93/71 SPO2 96% on room air     Labs  CBC: WBC 11.8  CMP: Unremarkable  Troponin: 5-6  Lactate: 0.5  TSH 3.77     Imaging  CT angio; Nonspecific diffuse interseptal thickening and mosaic attenuation of the lung fields with dependent predominance, which may be seen with interstitial infectious or inflammatory process or interstitial edema. There is also mildly prominent right hilar lymph node and multiple solid pulmonary nodules in the right lung measuring up to 1.1 cm in the basilar right lower lobe. These findings may be  seen in the setting of inflammatory process such as sarcoidosis, however are entirely nonspecific.     Microbiology  Blood cx: Collected  Covid/flu: N/A  UA: N/A     Intervention    Ceftriaxone, azithromycin, 3 L NS bolus    Hospital Course:  Patient was seen by cardiology, which reccomeneded outpatient holter, continous heart monitoring and nuclear stress test. Patient feels asymptomatic today, and requests for outpatient work up. Should continue abx for suspicion of pna. Follow up with PCP, cardiology and pulmonology.    HDS for discharge.    Pertinent Physical Exam At Time of Discharge  Physical Exam  Constitutional:       General: She is not in acute distress.  Cardiovascular:      Rate and Rhythm: Normal rate.      Heart sounds:      No friction rub. No gallop.   Pulmonary:      Effort: Pulmonary effort is normal. No respiratory distress.      Breath sounds: No wheezing.   Abdominal:      Palpations: Abdomen is soft.   Skin:     General: Skin is dry.   Neurological:      Mental Status: She is alert and oriented to person, place, and time.   Psychiatric:         Mood and Affect: Mood normal.         Behavior: Behavior normal.         Outpatient Follow-Up  No future appointments.      Naresh Ledesma DO

## 2024-12-08 NOTE — ED PROVIDER NOTES
HPI   Chief Complaint   Patient presents with    Shortness of Breath       Is a 54-year-old female coming in for shortness of breath as well as chest pressure.  She reports that she has had a feeling of palpitations and her heart racing for the last few days.  She started developing chest discomfort today.  Patient reports that she is otherwise healthy and takes antidepressant medications but denies any other major medical problems.  She has never had any heart issues.  She has not had any fevers or chills.  No cough or congestion.  She denies any vomiting or diarrhea.  She is eating drinking as per usual.  Upon arrival come back to the room she was found to be in SVT with a heart rate of 176.      History provided by:  Patient and spouse          Patient History   Past Medical History:   Diagnosis Date    Acute bronchitis 01/26/2024    Acute sinusitis 08/07/2008    Backache 06/18/2009    Contracture of muscle, left upper arm 09/02/2021    Contracture of muscle of left upper arm    Dog bite of lower leg 09/19/2022    Encounter for screening for malignant neoplasm of colon 09/02/2021    Colon cancer screening    Encounter for screening for malignant neoplasm of colon 09/02/2021    Colon cancer screening    Other specified abnormal findings of blood chemistry 06/23/2022    D-dimer, elevated    Pain in right knee 04/15/2022    Right knee pain    Person with feared health complaint in whom no diagnosis is made 07/22/2022    Concern about cardiovascular disease without diagnosis    Personal history of other medical treatment 09/02/2021    History of screening mammography    Urinary tract infection, site not specified 07/25/2022    Acute UTI     Past Surgical History:   Procedure Laterality Date    CHOLECYSTECTOMY      HYSTERECTOMY      OTHER SURGICAL HISTORY  09/02/2021    Gallbladder surgery    OTHER SURGICAL HISTORY  09/02/2021    Hysterectomy     Family History   Problem Relation Name Age of Onset    Breast cancer  Mother Netta bain     Arthritis Mother Netta bain     Asthma Mother Netta bain     Hearing loss Father Richard bain     Cancer Father Richard bain         unknown cancer type    Coronary artery disease Father Richard bain 52    Brain cancer Sister Nguyen     Heart disease Sister Nguyen     Breast cancer Sister Kely     Hypertension Sister Kely     Prostate cancer Brother x3     Diabetes Brother x3     Sleep apnea Daughter      Restless legs syndrome Daughter       Social History     Tobacco Use    Smoking status: Every Day     Current packs/day: 1.00     Average packs/day: 1 pack/day for 40.9 years (40.9 ttl pk-yrs)     Types: Cigarettes     Start date: 1/1/1984    Smokeless tobacco: Never   Vaping Use    Vaping status: Never Used   Substance Use Topics    Alcohol use: Yes     Comment: 1a month    Drug use: Not Currently     Types: Marijuana     Comment: in the 80's       Physical Exam   ED Triage Vitals [12/07/24 2303]   Temperature Heart Rate Respirations BP   36.1 °C (97 °F) (!) 172 14 93/71      Pulse Ox Temp Source Heart Rate Source Patient Position   96 % Temporal Monitor Lying      BP Location FiO2 (%)     Right arm --       Physical Exam  Vitals and nursing note reviewed.   Constitutional:       General: She is not in acute distress.     Appearance: Normal appearance. She is not toxic-appearing.   HENT:      Head: Normocephalic and atraumatic.      Nose: Nose normal.      Mouth/Throat:      Mouth: Mucous membranes are moist.      Pharynx: Oropharynx is clear.   Eyes:      Extraocular Movements: Extraocular movements intact.      Conjunctiva/sclera: Conjunctivae normal.      Pupils: Pupils are equal, round, and reactive to light.   Cardiovascular:      Rate and Rhythm: Regular rhythm. Tachycardia present.      Pulses: Normal pulses.      Heart sounds: Normal heart sounds.   Pulmonary:      Effort: Pulmonary effort is normal. No respiratory distress.      Breath sounds: Normal breath sounds.    Abdominal:      General: Abdomen is flat. Bowel sounds are normal.      Palpations: Abdomen is soft.      Tenderness: There is no abdominal tenderness.   Musculoskeletal:         General: Normal range of motion.      Cervical back: Normal range of motion and neck supple.   Skin:     General: Skin is warm and dry.      Coloration: Skin is not jaundiced or pale.      Findings: No bruising.   Neurological:      General: No focal deficit present.      Mental Status: She is alert and oriented to person, place, and time. Mental status is at baseline.   Psychiatric:         Mood and Affect: Mood normal.         Behavior: Behavior normal.           ED Course & MDM   ED Course as of 12/07/24 2351   Sat Dec 07, 2024   2321 Patient presents with chief complaint of shortness of breath.  Upon arrival here she is found to be in SVT.  Denies any recent changes in medications no drug use.  Despite Valsalva maneuver remained in SVT.  Was given adenosine IV and converted back to sinus rhythm. [WL]   2348 EKG completed at 2310 shows on my interpretation SVT with a rate of 172 bpm. [RS]   2349 EKG completed at 2332 shows on my interpretation normal sinus rhythm with a ventricular rate of 90 bpm.  No signs of STEMI.  Normal intervals. [RS]      ED Course User Index  [RS] Richard Selby PA-C  [WL] Patric Kam,          Diagnoses as of 12/07/24 2351   Chest pain, unspecified type   Supraventricular tachycardia (CMS-HCC)                 No data recorded     Valliant Coma Scale Score: 15 (12/07/24 2323 : Osmar Branham RN)                           Medical Decision Making  Summary:  Medical Decision Making:   Patient presented as described in HPI. Patient case including ROS, PE, and treatment and plan discussed with ED attending if attached as cosigner. Results from labs and or imaging included below if completed. Gisselle Avalos  is a 54 y.o. coming in for Patient presents with:  Shortness of Breath  .  Patient was brought  back to the room and was found to have SVT with a heart rate of 176.  Patient was further questioned and she does complain of some chest discomfort.  She complains of palpitation sensation.  No cardiac history.  Will questioning further nursing staff was grabbing adenosine as well as IV fluids.  Valsalva maneuver's are unsuccessful.  Patient was given 12 mg of adenosine and converted out into normal sinus rhythm.  Cardiac workup will be completed with troponins as well as PE scan.  Patient was given 1 L of IV normal saline.  Results are pending.          Labs Reviewed  CBC WITH AUTO DIFFERENTIAL - Abnormal     WBC                           11.8 (*)               nRBC                          0.0                    RBC                           4.78                   Hemoglobin                    14.3                   Hematocrit                    42.8                   MCV                           90                     MCH                           29.9                   MCHC                          33.4                   RDW                           13.2                   Platelets                     354                    Neutrophils %                 55.8                   Immature Granulocytes %, Automated   0.3                    Lymphocytes %                 35.6                   Monocytes %                   6.3                    Eosinophils %                 1.7                    Basophils %                   0.3                    Neutrophils Absolute          6.60                   Immature Granulocytes Absolute, Au*   0.04                   Lymphocytes Absolute          4.21                   Monocytes Absolute            0.75                   Eosinophils Absolute          0.20                   Basophils Absolute            0.03                COMPREHENSIVE METABOLIC PANEL - Abnormal     Glucose                       111 (*)                Sodium                        138                     Potassium                     3.9                    Chloride                      105                    Bicarbonate                   22                     Anion Gap                     15                     Urea Nitrogen                 17                     Creatinine                    0.91                   eGFR                          74                     Calcium                       9.1                    Albumin                       4.0                    Alkaline Phosphatase          54                     Total Protein                 6.4                    AST                           20                     Bilirubin, Total              0.3                    ALT                           26                  MAGNESIUM - Normal     Magnesium                     1.75                SERIAL TROPONIN-INITIAL - Normal     Troponin I, High Sensitivity   5                        Narrative: Less than 99th percentile of normal range cutoff-                Female and children under 18 years old <14 ng/L; Male <21 ng/L: Negative                Repeat testing should be performed if clinically indicated.                                 Female and children under 18 years old 14-50 ng/L; Male 21-50 ng/L:                Consistent with possible cardiac damage and possible increased clinical                 risk. Serial measurements may help to assess extent of myocardial damage.                                 >50 ng/L: Consistent with cardiac damage, increased clinical risk and                myocardial infarction. Serial measurements may help assess extent of                 myocardial damage.                                  NOTE: Children less than 1 year old may have higher baseline troponin                 levels and results should be interpreted in conjunction with the overall                 clinical context.                                 NOTE: Troponin I testing is performed using a different                  testing methodology at [facility] than at other                 [facility]. Direct result comparisons should only                 be made within the same method.  TROPONIN SERIES- (INITIAL, 1 HR)       Narrative: The following orders were created for panel order Troponin I Series, High Sensitivity (0, 1 HR).                Procedure                               Abnormality         Status                                   ---------                               -----------         ------                                   Troponin I, High Sensiti...[[id number]]  Normal              Final result                             Troponin, High Sensitivi...[[id number]]                      In process                                               Please view results for these tests on the individual orders.  SERIAL TROPONIN, 1 HOUR  TSH WITH REFLEX TO FREE T4 IF ABNORMAL   CT angio chest for pulmonary embolism    (Results Pending)                         Tests/Medications/Escalations of Care considered but not given: As in [facility]    Patient care discussed with: N/A  Social Determinants affecting care: N/A    Final diagnosis and disposition as documented     ED Course as of 12/07/24 2355  ------------------------------------------------------------  Time: 12/07 2321  Comment: Patient presents with chief complaint of shortness of breath.  Upon arrival here she is found to be in SVT.  Denies any recent changes in medications no drug use.  Despite Valsalva maneuver remained in SVT.  Was given adenosine IV and converted back to sinus rhythm.  By: Patric Kam DO  ------------------------------------------------------------  Time: 12/07 2348  Comment: EKG completed at 2310 shows on my interpretation SVT with a rate of 172 bpm.  By: Richard Selby PA-C  ------------------------------------------------------------  Time: 12/07 2349  Comment: EKG completed at 2332 shows on my interpretation normal sinus  rhythm with a ventricular rate of 90 bpm.  No signs of STEMI.  Normal intervals.  By: Richard Selby PA-C    ------------------------------------------------------------  Diagnoses as of 12/07/24 2355  Chest pain, unspecified type  Supraventricular tachycardia (CMS-HCC)           This note has been transcribed using voice recognition and may contain grammatical errors, misplaced words, incorrect words, incorrect phrases or other errors.         Procedure  Procedures     Richard Selby PA-C  12/08/24 0036

## 2024-12-08 NOTE — HOSPITAL COURSE
Gisselle Avalos is a 54 y.o. female with a Medical History of THIERRY [not currently on CPAP], anxiety, GERD, lumbar radiculopathy who presented on account of shortness of breath.Two weeks ago, patient starting having dyspnea, she described shortness of breath  with exertion, progressively worsened with any form of movement with occasional palpitations. Yesterday afternoon she started having left sided shoulder pain. Describes pain sharp intermitted, located in her left shoulder, she describes pain as sharp, intermittent, non radiation, with no aggravating or relieving factors. Also notes having bilateral ankle edema for several weeks.     She denies HA, FND, changes in speech/vision, lightheadedness, nausea/vomiting, changes in bowel/urinary habits, recent travels, exposure to sick contact.     Upon arrival to the ED she was noted to be in SVT, which was aborted with use of adenosine after failed trial of valsava. Initially presentation int he ED showed that she was hypertensive, but upon further discussion with patient, she says that she is always hypotensive. She also said that her shoulder pain has resolved since coming to the ED. She has a hx of THIERRY, but has not been able to get a cpap.      ED course:   Vitals  T 37.1  RR 14 BP 93/71 SPO2 96% on room air     Labs  CBC: WBC 11.8  CMP: Unremarkable  Troponin: 5-6  Lactate: 0.5  TSH 3.77     Imaging  CT angio; Nonspecific diffuse interseptal thickening and mosaic attenuation of the lung fields with dependent predominance, which may be seen with interstitial infectious or inflammatory process or interstitial edema. There is also mildly prominent right hilar lymph node and multiple solid pulmonary nodules in the right lung measuring up to 1.1 cm in the basilar right lower lobe. These findings may be seen in the setting of inflammatory process such as sarcoidosis, however are entirely nonspecific.     Microbiology  Blood cx: Collected  Covid/flu: N/A  UA:  N/A     Intervention    Ceftriaxone, azithromycin, 3 L NS bolus    Hospital Course:  Patient was seen by cardiology, which reccomeneded outpatient holter, continous heart monitoring and nuclear stress test. Patient feels asymptomatic today, and requests for outpatient work up. Should continue abx for suspicion of pna. Follow up with PCP, cardiology and pulmonology.    HDS for discharge.

## 2024-12-09 ENCOUNTER — PATIENT OUTREACH (OUTPATIENT)
Dept: PRIMARY CARE | Facility: CLINIC | Age: 54
End: 2024-12-09
Payer: MEDICARE

## 2024-12-09 DIAGNOSIS — R07.89 ATYPICAL CHEST PAIN: ICD-10-CM
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
## 2024-12-09 PROBLEM — S13.4XXA WHIPLASH INJURY TO NECK: Status: RESOLVED | Noted: 2024-05-28 | Resolved: 2024-12-09

## 2024-12-09 LAB — PROCALCITONIN SERPL-MCNC: 0.02 NG/ML

## 2024-12-09 NOTE — PROGRESS NOTES
Discharge Facility: [facility]  Discharge Diagnosis: Chest Pain  Admission Date: 7 Dec 24  Discharge Date: 8 Dec 24    PCP Appointment Date: 10 Dec 24  Specialist Appointment Date: 27 Jan 25 ([facility])  Hospital Encounter and Summary Linked: Yes    No call due to follow up being within 2 days of discharge. Will make post PCP appt call.

## 2024-12-10 ENCOUNTER — APPOINTMENT (OUTPATIENT)
Dept: CARDIOLOGY | Facility: HOSPITAL | Age: 54
End: 2024-12-10
Payer: MEDICARE

## 2024-12-10 ENCOUNTER — OFFICE VISIT (OUTPATIENT)
Dept: PRIMARY CARE | Facility: CLINIC | Age: 54
End: 2024-12-10
Payer: MEDICARE

## 2024-12-10 ENCOUNTER — TELEPHONE (OUTPATIENT)
Dept: PRIMARY CARE | Facility: CLINIC | Age: 54
End: 2024-12-10

## 2024-12-10 VITALS
DIASTOLIC BLOOD PRESSURE: 78 MMHG | BODY MASS INDEX: 35.9 KG/M2 | OXYGEN SATURATION: 94 % | HEART RATE: 94 BPM | SYSTOLIC BLOOD PRESSURE: 124 MMHG | WEIGHT: 243.13 LBS

## 2024-12-10 DIAGNOSIS — K21.9 GASTROESOPHAGEAL REFLUX DISEASE, UNSPECIFIED WHETHER ESOPHAGITIS PRESENT: ICD-10-CM

## 2024-12-10 DIAGNOSIS — G47.33 OSA (OBSTRUCTIVE SLEEP APNEA): ICD-10-CM

## 2024-12-10 DIAGNOSIS — R06.02 SOB (SHORTNESS OF BREATH) ON EXERTION: Primary | ICD-10-CM

## 2024-12-10 DIAGNOSIS — Z72.0 SMOKING TRYING TO QUIT: ICD-10-CM

## 2024-12-10 DIAGNOSIS — R92.8 ABNORMAL MAMMOGRAM: ICD-10-CM

## 2024-12-10 DIAGNOSIS — G47.00 INSOMNIA, UNSPECIFIED TYPE: ICD-10-CM

## 2024-12-10 DIAGNOSIS — F41.9 ANXIETY: ICD-10-CM

## 2024-12-10 DIAGNOSIS — E78.5 HYPERLIPIDEMIA, UNSPECIFIED HYPERLIPIDEMIA TYPE: ICD-10-CM

## 2024-12-10 DIAGNOSIS — G62.89 OTHER POLYNEUROPATHY: ICD-10-CM

## 2024-12-10 LAB — STAPHYLOCOCCUS SPEC CULT: NORMAL

## 2024-12-10 PROCEDURE — 99496 TRANSJ CARE MGMT HIGH F2F 7D: CPT | Performed by: FAMILY MEDICINE

## 2024-12-10 RX ORDER — BUSPIRONE HYDROCHLORIDE 10 MG/1
10 TABLET ORAL 2 TIMES DAILY
Qty: 180 TABLET | Refills: 0 | OUTPATIENT
Start: 2024-12-10

## 2024-12-10 RX ORDER — OMEPRAZOLE 40 MG/1
40 CAPSULE, DELAYED RELEASE ORAL DAILY
Qty: 90 CAPSULE | Refills: 0 | OUTPATIENT
Start: 2024-12-10

## 2024-12-10 RX ORDER — GABAPENTIN 300 MG/1
300 CAPSULE ORAL NIGHTLY
Qty: 100 CAPSULE | Refills: 3 | Status: SHIPPED | OUTPATIENT
Start: 2024-12-10

## 2024-12-10 RX ORDER — ATORVASTATIN CALCIUM 10 MG/1
10 TABLET, FILM COATED ORAL NIGHTLY
Qty: 100 TABLET | Refills: 3 | Status: SHIPPED | OUTPATIENT
Start: 2024-12-10

## 2024-12-10 RX ORDER — OMEPRAZOLE 40 MG/1
40 CAPSULE, DELAYED RELEASE ORAL
Qty: 90 CAPSULE | Refills: 1 | Status: SHIPPED | OUTPATIENT
Start: 2024-12-10

## 2024-12-10 RX ORDER — VARENICLINE TARTRATE 1 MG/1
1 TABLET, FILM COATED ORAL 2 TIMES DAILY
Qty: 180 TABLET | Refills: 0 | Status: SHIPPED | OUTPATIENT
Start: 2024-12-17

## 2024-12-10 RX ORDER — SERTRALINE HYDROCHLORIDE 100 MG/1
100 TABLET, FILM COATED ORAL DAILY
Qty: 90 TABLET | Refills: 0 | Status: SHIPPED | OUTPATIENT
Start: 2024-12-10 | End: 2025-02-26

## 2024-12-10 RX ORDER — TRAZODONE HYDROCHLORIDE 100 MG/1
100 TABLET ORAL NIGHTLY
Qty: 90 TABLET | Refills: 0 | OUTPATIENT
Start: 2024-12-10

## 2024-12-10 RX ORDER — VARENICLINE TARTRATE 0.5 (11)-1
KIT ORAL
Qty: 1 EACH | Refills: 0 | Status: SHIPPED | OUTPATIENT
Start: 2024-12-10 | End: 2025-01-08

## 2024-12-10 RX ORDER — TRAZODONE HYDROCHLORIDE 100 MG/1
100 TABLET ORAL NIGHTLY
Qty: 90 TABLET | Refills: 1 | Status: SHIPPED | OUTPATIENT
Start: 2024-12-10

## 2024-12-10 RX ORDER — BUSPIRONE HYDROCHLORIDE 10 MG/1
10 TABLET ORAL 2 TIMES DAILY
Qty: 200 TABLET | Refills: 3 | Status: SHIPPED | OUTPATIENT
Start: 2024-12-10

## 2024-12-10 RX ORDER — SERTRALINE HYDROCHLORIDE 100 MG/1
100 TABLET, FILM COATED ORAL DAILY
Qty: 90 TABLET | Refills: 0 | OUTPATIENT
Start: 2024-12-10

## 2024-12-10 ASSESSMENT — ENCOUNTER SYMPTOMS
DIAPHORESIS: 0
SHORTNESS OF BREATH: 1
CHEST TIGHTNESS: 1
PALPITATIONS: 0
FEVER: 0
CHILLS: 0

## 2024-12-10 NOTE — ASSESSMENT & PLAN NOTE
Orders:    varenicline (Chantix Starting Month Box) 0.5 mg (11)- 1 mg (42) tablet; Day 1-3 take 0.5 mg once daily. Day 4-7 take 0.5 mg twice daily. Day 8 and following take 1 mg twice daily.    varenicline (Chantix Continuing Month Box) 1 mg tablet; Take 1 tablet (1 mg) by mouth 2 times a day. Do not fill before December 17, 2024.

## 2024-12-10 NOTE — ASSESSMENT & PLAN NOTE
Orders:    gabapentin (Neurontin) 300 mg capsule; Take 1 capsule (300 mg) by mouth once daily at bedtime.

## 2024-12-10 NOTE — TELEPHONE ENCOUNTER
Medication Refill Request:       Medication: sertraline (Zoloft) 100 mg tablet  1 po every day     Qty: 90       LOV: 6/20/2024    NOV: 12/10/2024      Pharmacy: [facility] mail Delivery

## 2024-12-10 NOTE — ASSESSMENT & PLAN NOTE
Orders:    Follow Up In Primary Care    atorvastatin (Lipitor) 10 mg tablet; Take 1 tablet (10 mg) by mouth once daily at bedtime.    CT cardiac scoring wo IV contrast; Future    Comprehensive Metabolic Panel; Future    Lipid Panel; Future    Follow Up In Primary Care; Future

## 2024-12-10 NOTE — PROGRESS NOTES
Discharge Facility: [facility]  Discharge Diagnosis: Chest Pain  Admission Date: 7 Dec 24  Discharge Date: 8 Dec 24    Subjective   Patient ID: Gisselle Avalos is a 54 y.o. female who presents for Follow-up (Pt presents for TCM, states not a short of breath, still having some SOB see pulmonology tomorrow, and Cardiology next week. Refills needed. BL).  HPI Historian(s): Self    c/o SOB. Reports HR high 80s.     Tried patch/lozenges/gum, makes her heart race. Would like to try Chantix.        Review of Systems   Constitutional:  Negative for chills, diaphoresis and fever.   Respiratory:  Positive for chest tightness (improving) and shortness of breath.    Cardiovascular:  Negative for chest pain and palpitations.   All other systems reviewed and are negative.    No LMP recorded. Patient has had a hysterectomy.    Patient Care Team:  Alex Crowley DO as PCP - General (Family Medicine)  Alex Crowley DO as PCP - [employer] PCP  Kenney Flores MD as Referring Physician (Otolaryngology)  Gabino Landa RN as Care Manager (Case Management)    Current Outpatient Medications   Medication Instructions    atorvastatin (LIPITOR) 10 mg, oral, Nightly    azithromycin (ZITHROMAX) 500 mg, oral, Every 24 hours scheduled    busPIRone (BUSPAR) 10 mg, oral, 2 times daily    cefuroxime (CEFTIN) 500 mg, oral, 2 times daily    diazePAM (Valium) 5 mg tablet TAKE 1-2 TABS PO ONE HOUR PRIOR TO PROCEDURE. MAY REPEAT UPON ARRIVAL TO PROCEDURE    gabapentin (NEURONTIN) 300 mg, oral, Nightly    MAGNESIUM GLYCINATE ORAL 2 capsules, Nightly    methocarbamol (ROBAXIN) 500 mg, oral, 3 times daily PRN, As needed for pain    omeprazole (PRILOSEC) 40 mg, oral, Daily RT    sertraline (ZOLOFT) 100 mg, oral, Daily    traZODone (DESYREL) 100 mg, oral, Nightly    [START ON 12/17/2024] varenicline (CHANTIX CONTINUING MONTH BOX) 1 mg, oral, 2 times daily    varenicline (Chantix Starting Month Box) 0.5 mg (11)- 1 mg (42)  tablet Day 1-3 take 0.5 mg once daily. Day 4-7 take 0.5 mg twice daily. Day 8 and following take 1 mg twice daily.       Objective   /78   Pulse 94   Wt 110 kg (243 lb 2 oz)   SpO2 94%   BMI 35.90 kg/m²           Physical Exam  Vitals and nursing note reviewed.   Constitutional:       General: She is not in acute distress.     Appearance: Normal appearance. She is not diaphoretic.   HENT:      Head: Normocephalic and atraumatic.   Eyes:      General: No scleral icterus.     Extraocular Movements: Extraocular movements intact.      Conjunctiva/sclera: Conjunctivae normal.   Cardiovascular:      Rate and Rhythm: Normal rate and regular rhythm.      Heart sounds: Normal heart sounds.   Pulmonary:      Effort: Pulmonary effort is normal. No respiratory distress.      Breath sounds: Normal breath sounds. No wheezing, rhonchi or rales.   Musculoskeletal:      Right lower leg: No edema.      Left lower leg: No edema.   Skin:     General: Skin is warm and dry.      Coloration: Skin is not jaundiced.   Neurological:      General: No focal deficit present.      Mental Status: She is alert and oriented to person, place, and time. Mental status is at baseline.   Psychiatric:         Mood and Affect: Mood normal.         Behavior: Behavior normal.         Thought Content: Thought content normal.         Assessment & Plan  SOB (shortness of breath) on exertion  Try Chantix to help with smoking cessation. Discussed potential adverse effects, especially cardiac, psychiatric, GI.       Hyperlipidemia, unspecified hyperlipidemia type    Orders:    Follow Up In Primary Care    atorvastatin (Lipitor) 10 mg tablet; Take 1 tablet (10 mg) by mouth once daily at bedtime.    CT cardiac scoring wo IV contrast; Future    Comprehensive Metabolic Panel; Future    Lipid Panel; Future    Follow Up In Primary Care; Future    Smoking trying to quit    Orders:    varenicline (Chantix Starting Month Box) 0.5 mg (11)- 1 mg (42) tablet; Day  1-3 take 0.5 mg once daily. Day 4-7 take 0.5 mg twice daily. Day 8 and following take 1 mg twice daily.    varenicline (Chantix Continuing Month Box) 1 mg tablet; Take 1 tablet (1 mg) by mouth 2 times a day. Do not fill before December 17, 2024.    Anxiety    Orders:    busPIRone (Buspar) 10 mg tablet; Take 1 tablet (10 mg) by mouth 2 times a day.    sertraline (Zoloft) 100 mg tablet; Take 1 tablet (100 mg) by mouth once daily.    Other polyneuropathy    Orders:    gabapentin (Neurontin) 300 mg capsule; Take 1 capsule (300 mg) by mouth once daily at bedtime.    Insomnia, unspecified type    Orders:    traZODone (Desyrel) 100 mg tablet; Take 1 tablet (100 mg) by mouth once daily at bedtime.    Abnormal mammogram    Orders:    BI mammo bilateral diagnostic tomosynthesis; Future    Gastroesophageal reflux disease, unspecified whether esophagitis present    Orders:    omeprazole (PriLOSEC) 40 mg DR capsule; Take 1 capsule (40 mg) by mouth once daily.    THIERRY (obstructive sleep apnea)    Orders:    Positive Airway Pressure (PAP) Therapy

## 2024-12-10 NOTE — ASSESSMENT & PLAN NOTE
Orders:    busPIRone (Buspar) 10 mg tablet; Take 1 tablet (10 mg) by mouth 2 times a day.    sertraline (Zoloft) 100 mg tablet; Take 1 tablet (100 mg) by mouth once daily.

## 2024-12-10 NOTE — PATIENT INSTRUCTIONS
Please start the Atorvastatin if your CT Coronary Artery Calcium Score is > 0, but especially if > 100.    Please schedule a follow-up appointment with Dr. Knutson for 30-90 days after starting CPAP.    Proton Pump Inhibitors (PPI, e.g., Prilosec/omeprazole, Nexium/esomeprazole, Protonix/pantoprazole, and others) may cause vitamin or mineral deficiencies, kidney damage, dementia, stomach polyps, fractures and thinning of the bones (osteoporosis), intestinal infections including C. diff., lupus. Some of these side effects are more likely with chronic use. There are other possible side effects, and it is recommended, as with any medication, to review all possible side effects. Chronic PPI use may be necessary in certain situations (e.g., Solo esophagus).     Please return for a(n) cholesterol and medication follow-up appointment in 3 months, after tests to review results and options, earlier if any question or concern. Please schedule additional problem-focused appointment(s) to address additional problem(s).    Recommended vaccines:  Influenza, annual  Avoid taking Biotin (a vitamin, shows up particularly in hair/nail supplements) for a week prior to any blood tests, as it can interfere with certain results. Fasting for labs means 12 hours, nothing to eat or drink, except water and medications, unless directed otherwise.    For assistance with scheduling referrals or consultations, please call [phone]. For laboratory, radiology, and other tests, please call [phone] ([phone] for pediatrics). Please review prescription inserts and published information for possible adverse effects of all medications. Return after testing or consultation to review results and recommendations, if symptoms persist, change, worsen, or return, or if you have any question or concern. If you do not get results within 7-10 days, or you have any question or concern, please send a message, call the office ([phone]), or return  to the office for a follow-up appointment. For non-emergencies, you may call the office. For emergencies, call 9-1-1 or go to the nearest Emergency Department. Please schedule additional appointment(s) to address concern(s) not addressed today. An annual Wellness visit is strongly recommended. A Wellness visit should be dedicated to addressing routine health maintenance matters (e.g., cancer screenings, cardiovascular screening, etc.). Problem-focused visits, typically prompted by symptoms or specific concerns, are usually conducted separately, particularly if multiple or complex problems need to be addressed.    In general, results are not released or discussed over the telephone, but at an appointment or via  [employer]. Results of tests done through [facility] are released via  [employer] (see below).  [email or web address]   [employer] support line: [phone]

## 2024-12-10 NOTE — ASSESSMENT & PLAN NOTE
Orders:    traZODone (Desyrel) 100 mg tablet; Take 1 tablet (100 mg) by mouth once daily at bedtime.

## 2024-12-11 ENCOUNTER — LAB (OUTPATIENT)
Dept: LAB | Facility: LAB | Age: 54
End: 2024-12-11
Payer: MEDICARE

## 2024-12-11 ENCOUNTER — OFFICE VISIT (OUTPATIENT)
Dept: PULMONOLOGY | Facility: CLINIC | Age: 54
End: 2024-12-11
Payer: MEDICARE

## 2024-12-11 ENCOUNTER — PATIENT OUTREACH (OUTPATIENT)
Dept: PRIMARY CARE | Facility: CLINIC | Age: 54
End: 2024-12-11
Payer: MEDICARE

## 2024-12-11 VITALS
BODY MASS INDEX: 35.59 KG/M2 | HEART RATE: 97 BPM | DIASTOLIC BLOOD PRESSURE: 77 MMHG | SYSTOLIC BLOOD PRESSURE: 116 MMHG | RESPIRATION RATE: 16 BRPM | WEIGHT: 241 LBS | OXYGEN SATURATION: 96 %

## 2024-12-11 DIAGNOSIS — G47.33 OSA (OBSTRUCTIVE SLEEP APNEA): Primary | ICD-10-CM

## 2024-12-11 DIAGNOSIS — J30.89 OTHER ALLERGIC RHINITIS: ICD-10-CM

## 2024-12-11 DIAGNOSIS — C34.90 MALIGNANT NEOPLASM OF UNSPECIFIED PART OF UNSPECIFIED BRONCHUS OR LUNG (MULTI): ICD-10-CM

## 2024-12-11 DIAGNOSIS — R91.8 MULTIPLE LUNG NODULES ON CT: ICD-10-CM

## 2024-12-11 DIAGNOSIS — R06.02 SOB (SHORTNESS OF BREATH) ON EXERTION: ICD-10-CM

## 2024-12-11 DIAGNOSIS — E55.9 VITAMIN D DEFICIENCY: ICD-10-CM

## 2024-12-11 DIAGNOSIS — R91.1 LUNG NODULE SEEN ON IMAGING STUDY: ICD-10-CM

## 2024-12-11 DIAGNOSIS — E83.10 DISORDER OF IRON METABOLISM: ICD-10-CM

## 2024-12-11 LAB
FERRITIN SERPL-MCNC: 204 NG/ML (ref 8–150)
IRON SATN MFR SERPL: 21 % (ref 25–45)
IRON SERPL-MCNC: 80 UG/DL (ref 35–150)
TIBC SERPL-MCNC: 377 UG/DL (ref 240–445)
UIBC SERPL-MCNC: 297 UG/DL (ref 110–370)

## 2024-12-11 PROCEDURE — 99214 OFFICE O/P EST MOD 30 MIN: CPT | Performed by: INTERNAL MEDICINE

## 2024-12-11 PROCEDURE — 82306 VITAMIN D 25 HYDROXY: CPT

## 2024-12-11 PROCEDURE — 83540 ASSAY OF IRON: CPT

## 2024-12-11 PROCEDURE — 82785 ASSAY OF IGE: CPT

## 2024-12-11 PROCEDURE — 86003 ALLG SPEC IGE CRUDE XTRC EA: CPT

## 2024-12-11 PROCEDURE — 99204 OFFICE O/P NEW MOD 45 MIN: CPT | Performed by: INTERNAL MEDICINE

## 2024-12-11 PROCEDURE — 83550 IRON BINDING TEST: CPT

## 2024-12-11 PROCEDURE — 82728 ASSAY OF FERRITIN: CPT

## 2024-12-11 ASSESSMENT — PAIN SCALES - GENERAL: PAINLEVEL_OUTOF10: 0-NO PAIN

## 2024-12-11 ASSESSMENT — COLUMBIA-SUICIDE SEVERITY RATING SCALE - C-SSRS
2. HAVE YOU ACTUALLY HAD ANY THOUGHTS OF KILLING YOURSELF?: NO
1. IN THE PAST MONTH, HAVE YOU WISHED YOU WERE DEAD OR WISHED YOU COULD GO TO SLEEP AND NOT WAKE UP?: NO
6. HAVE YOU EVER DONE ANYTHING, STARTED TO DO ANYTHING, OR PREPARED TO DO ANYTHING TO END YOUR LIFE?: NO

## 2024-12-11 ASSESSMENT — ENCOUNTER SYMPTOMS: DEPRESSION: 0

## 2024-12-11 NOTE — PROGRESS NOTES
Department of Medicine  Division of Pulmonary, Critical Care, and Sleep Medicine  Consultation  [facility] - [address], [address]    I was asked by Tc Rodriguez MD, to evaluate Ms. Gisselle Avalos for lung nodules. I have independently interviewed and examined the patient in the office and reviewed available records.    Physician HPI (12/11/2024):   Pleasant 54-year-old woman presenting for lung nodules.  Patient hospitalized few days ago and treated for SVT, dyspnea, treated for possible pneumonia.  Patient gained about 40 pounds in the past year, reported chronic dyspnea but much worse in the past 2 weeks, associated occasionally with wheezing.  No fever, no productive cough, no chest pain or hemoptysis.    Active smoker about 1 pack/day x 32 years.  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  No history of asbestos exposure.  No history of normal toxic drugs.          Immunization History   Administered Date(s) Administered    Flu vaccine (IIV4), preservative free *Check age/dose* 11/08/2021, 12/07/2022, 11/22/2023    Flu vaccine, trivalent, preservative free, no egg protein, age 6 months or greater (Flucelvax) 09/16/2024    Influenza, Unspecified 11/18/2012, 01/10/2014    Influenza, seasonal, injectable 12/18/2014    Moderna COVID-19 vaccine, 12 years and older (50mcg/0.5mL)(Spikevax) 12/13/2023, 09/16/2024    Moderna COVID-19 vaccine, bivalent, blue cap/gray label *Check age/dose* 12/07/2022    Moderna SARS-CoV-2 Vaccination 11/08/2021    Pneumococcal conjugate vaccine, 20-valent (PREVNAR 20) 11/22/2023    Pneumococcal polysaccharide vaccine, 23-valent, age 2 years and older (PNEUMOVAX 23) 01/10/2014    Tdap vaccine, age 7 year and older (BOOSTRIX, ADACEL) 09/08/2022    Zoster vaccine, recombinant, adult (SHINGRIX) 12/13/2023, 09/16/2024       Past Medical History:   Diagnosis Date    Acute bronchitis 01/26/2024    Acute sinusitis 08/07/2008    Backache  06/18/2009    Contracture of muscle, left upper arm 09/02/2021    Contracture of muscle of left upper arm    Dog bite of lower leg 09/19/2022    Encounter for screening for malignant neoplasm of colon 09/02/2021    Colon cancer screening    Encounter for screening for malignant neoplasm of colon 09/02/2021    Colon cancer screening    Other specified abnormal findings of blood chemistry 06/23/2022    D-dimer, elevated    Pain in right knee 04/15/2022    Right knee pain    Person with feared health complaint in whom no diagnosis is made 07/22/2022    Concern about cardiovascular disease without diagnosis    Personal history of other medical treatment 09/02/2021    History of screening mammography    Urinary tract infection, site not specified 07/25/2022    Acute UTI       Past Surgical History:   Procedure Laterality Date    CHOLECYSTECTOMY      HYSTERECTOMY      OTHER SURGICAL HISTORY  09/02/2021    Gallbladder surgery    OTHER SURGICAL HISTORY  09/02/2021    Hysterectomy       Family History   Problem Relation Name Age of Onset    Breast cancer Mother Netta bain     Arthritis Mother Netta bain     Asthma Mother Netta bain     Hearing loss Father Richard bain     Cancer Father Richard bain         unknown cancer type    Coronary artery disease Father Richard bain 52    Brain cancer Sister Nguyen     Heart disease Sister Nguyen     Breast cancer Sister Kely     Hypertension Sister Kely     Prostate cancer Brother x3     Diabetes Brother x3     Sleep apnea Daughter      Restless legs syndrome Daughter         Social History     Tobacco Use    Smoking status: Every Day     Current packs/day: 1.00     Average packs/day: 1 pack/day for 40.9 years (40.9 ttl pk-yrs)     Types: Cigarettes     Start date: 1/1/1984    Smokeless tobacco: Never   Vaping Use    Vaping status: Never Used   Substance Use Topics    Alcohol use: Yes     Comment: 1a month    Drug use: Not Currently     Types: Marijuana     Comment: in the  "80's       Occupational & Environmental History:        Current Medications:  Current Outpatient Medications   Medication Instructions    atorvastatin (LIPITOR) 10 mg, oral, Nightly    busPIRone (BUSPAR) 10 mg, oral, 2 times daily    cefuroxime (CEFTIN) 500 mg, oral, 2 times daily    diazePAM (Valium) 5 mg tablet TAKE 1-2 TABS PO ONE HOUR PRIOR TO PROCEDURE. MAY REPEAT UPON ARRIVAL TO PROCEDURE    gabapentin (NEURONTIN) 300 mg, oral, Nightly    MAGNESIUM GLYCINATE ORAL 2 capsules, Nightly    methocarbamol (ROBAXIN) 500 mg, oral, 3 times daily PRN, As needed for pain    omeprazole (PRILOSEC) 40 mg, oral, Daily RT    sertraline (ZOLOFT) 100 mg, oral, Daily    traZODone (DESYREL) 100 mg, oral, Nightly    [START ON 12/17/2024] varenicline (CHANTIX CONTINUING MONTH BOX) 1 mg, oral, 2 times daily    varenicline (Chantix Starting Month Box) 0.5 mg (11)- 1 mg (42) tablet Day 1-3 take 0.5 mg once daily. Day 4-7 take 0.5 mg twice daily. Day 8 and following take 1 mg twice daily.        Drug Allergies/Intolerances:  Allergies   Allergen Reactions    Amitriptyline Insomnia    Penicillins Unknown     able to take amoxicillin        Visit Vitals  /77   Pulse 97   Resp 16   Wt 109 kg (241 lb)   SpO2 96%   BMI 35.59 kg/m²   OB Status Hysterectomy   Smoking Status Every Day   BSA 2.3 m²        Physical exam  Constitutional: Normal appearance.  HEENT: Normocephalic and atraumatic.  Cardiovascular: Normal rate and regular rhythm.  Pulmonary: Normal respiratory effort, bilateral clear breath sounds, no wheezing or rhonchi.  Musculoskeletal: No edema, no cyanosis.  Neurological: Awake, alert and oriented x3.  Psychiatric: Normal behavior, mood and affect.    Pulmonary Function Test Results     Pulmonary Functions Testing Results:    No results found for: \"FEV1\", \"FVC\", \"PVP0XIR\", \"TLC\", \"DLCO\"     Chest Radiograph     CHEST 2 VIEW 06/21/2022    Narrative  MRN: [id number]  Patient Name: ESTUARDO LUTHER    STUDY:  TH CHEST 2 VIEW " PA AND LAT;  6/21/2022 11:53 am    INDICATION:  sob, anxiety  R06.02: SOB (shortness of breath) on exertion.    COMPARISON:  No priors    ACCESSION NUMBER(S):  [id number]    ORDERING CLINICIAN:  CHEN DONIS    FINDINGS:  There is no focal lung consolidation or effusion. There is no edema.  The cardiac silhouette is within normal limits for size. Note is made  of multiple remote left rib fracture deformities.    Impression  No acute cardiopulmonary process.      Echocardiogram     No results found for this or any previous visit from the past 365 days.       Chest CT Scan     CT angio chest for pulmonary embolism 12/07/2024    Narrative  Interpreted By:  Paul Keith,  STUDY:  CT ANGIO CHEST FOR PULMONARY EMBOLISM;  12/7/2024 11:54 pm    INDICATION:  Signs/Symptoms:sob and tachy.    COMPARISON:  Correlation made with CT thoracic spine.    ACCESSION NUMBER(S):  [id number]    ORDERING CLINICIAN:  SCOTT ANGELA    TECHNIQUE:  Helical data acquisition of the chest was obtained contrast volume:  81 mL IV contrast Omnipaque 350.    Axial contiguous images were reformatted in coronal and sagittal  planes. Axial and coronal MIP images were created and reviewed.    FINDINGS:  POTENTIAL LIMITATIONS OF THE STUDY: Motion and mixing artifact which  limits evaluation of the distal branch vessels.    HEART AND VESSELS:  No discrete filling defects within the main pulmonary artery or its  branches.    Main pulmonary artery and its branches are normal in caliber.    The thoracic aorta is of normal course and caliber.    No coronary artery calcifications are seen.The study is not optimized  for evaluation of coronary arteries.    The cardiac chambers are not enlarged.    No evidence of pericardial effusion.    MEDIASTINUM AND LUCIUS, LOWER NECK AND AXILLA:  The visualized thyroid gland is within normal limits.    There is prominent right hilar lymph node measuring 1.6 cm on axial  image 99, nonspecific. Otherwise no mediastinal  lymphadenopathy.    Esophagus appears within normal limits as seen.    LUNGS AND AIRWAYS:  The trachea and central airways are patent. No endobronchial lesion.  Nonspecific diffuse interseptal thickening and mosaic attenuation of  the lung fields which may be seen in the setting of interstitial  infectious or inflammatory process or in the setting of pulmonary  edema. No focal consolidation. There is suggestion of slightly  increasing solid pulmonary nodule in the basilar right lower lobe  measuring 1.1 cm. There is also a 6 mm right upper lobe pulmonary  nodule on axial image 79 and additional 5 mm solid pulmonary nodule  in the anterior right upper lobe on image 104.. There is also  unchanged 5 mm pulmonary nodule along the right major fissure in the  lower lobe on axial image 109.    UPPER ABDOMEN:  There is hepatomegaly. Cholecystectomy change.    CHEST WALL AND OSSEOUS STRUCTURES:  There are no suspicious osseous lesions. The visualized osseous  structures are intact. Mild endplate degenerative change in the lower  thoracic spine.    Impression  1.  No evidence of pulmonary emboli.  2. Nonspecific diffuse interseptal thickening and mosaic attenuation  of the lung fields with dependent predominance, which may be seen  with interstitial infectious or inflammatory process or interstitial  edema. There is also mildly prominent right hilar lymph node and  multiple solid pulmonary nodules in the right lung measuring up to  1.1 cm in the basilar right lower lobe. These findings may be seen in  the setting of inflammatory process such as sarcoidosis, however are  entirely nonspecific. Pulmonology follow-up and chest CT or PET-CT in  3 months is recommended.      Signed by: Paul Keith 12/8/2024 12:50 AM  Dictation workstation:   [id number]       Laboratory Studies     Lab Results   Component Value Date    WBC 7.1 12/08/2024    HGB 12.9 12/08/2024    HCT 39.6 12/08/2024    MCV 90 12/08/2024     12/08/2024     "  Lab Results   Component Value Date    GLUCOSE 105 (H) 12/08/2024    CALCIUM 8.4 (L) 12/08/2024     12/08/2024    K 4.0 12/08/2024    CO2 23 12/08/2024     (H) 12/08/2024    BUN 14 12/08/2024    CREATININE 0.76 12/08/2024      Lab Results   Component Value Date    ALT 26 12/07/2024    AST 20 12/07/2024    ALKPHOS 54 12/07/2024    BILITOT 0.3 12/07/2024      No results found for: \"PROTIME\", \"INR\", \"PTT\"  No results found for: \"ICIGE\", \"IGE\", \"ICA04\", \"ASPFU\", \"IGG\", \"IGA\", \"IGM\"    Sputum Culture     No results found for: \"AFBCX\"   No results found for: \"RESPCULTCYFI\"  No results found for the last 90 days.          Assessment and Plan / Recommendations   Pleasant 54-year-old woman presenting for lung nodules.  Patient hospitalized few days ago and treated for SVT, dyspnea, treated for possible pneumonia.  CT PE from 12/7/2024 negative for PE, showed mosaic perfusion, hilar/mediastinal adenopathy with multiple lung nodules.  Mosaic perfusion could be due to pulmonary edema in the setting of tachycardia versus small airway disease, hypersensitivity pneumonitis.  Lung nodules and adenopathy suspect inflammatory like sarcoidosis versus fungal infection/histo, possible but less likely malignant.  Further evaluation of her dyspnea/CT findings with  -PET scan  -PFT  -Allergy panel, hypersensitivity panel  -Strongly encouraged to quit smoking, has Chantix ordered by Dr. Morse, encouraged to start    Moderate sleep apnea, CPAP 7-12 recommended in the past but she never received it.  We will order auto CPAP 7-12, download with next visit.  Encouraged to exercise and lose weight.    Return to clinic in 2-month or sooner with any concerns.    This dictation was created using voice recognition software. Phonetic and/or minor grammatical errors may exist.       Albina Alexis MD  12/11/2024    "

## 2024-12-11 NOTE — PROGRESS NOTES
Call regarding appt. with PCP on (10 Dec 24) after hospitalization.  At time of outreach call the patient feels as if their condition has improved since last visit.  Reviewed the PCP appointment with the pt and addressed any questions or concerns.

## 2024-12-12 ENCOUNTER — TELEPHONE (OUTPATIENT)
Dept: PRIMARY CARE | Facility: CLINIC | Age: 54
End: 2024-12-12
Payer: MEDICARE

## 2024-12-12 DIAGNOSIS — G25.81 RLS (RESTLESS LEGS SYNDROME): Primary | ICD-10-CM

## 2024-12-12 LAB
25(OH)D3 SERPL-MCNC: 16 NG/ML (ref 30–100)
A ALTERNATA IGE QN: <0.1 KU/L
A FUMIGATUS IGE QN: <0.1 KU/L
BACTERIA BLD CULT: NORMAL
BACTERIA BLD CULT: NORMAL
BERMUDA GRASS IGE QN: <0.1 KU/L
BOXELDER IGE QN: <0.1 KU/L
C HERBARUM IGE QN: <0.1 KU/L
CALIF WALNUT POLN IGE QN: <0.1 KU/L
CAT DANDER IGE QN: <0.1 KU/L
CMN PIGWEED IGE QN: <0.1 KU/L
COMMON RAGWEED IGE QN: <0.1 KU/L
COTTONWOOD IGE QN: <0.1 KU/L
D FARINAE IGE QN: <0.1 KU/L
D PTERONYSS IGE QN: <0.1 KU/L
DOG DANDER IGE QN: <0.1 KU/L
ENGL PLANTAIN IGE QN: <0.1 KU/L
GOOSEFOOT IGE QN: <0.1 KU/L
JOHNSON GRASS IGE QN: <0.1 KU/L
KENT BLUE GRASS IGE QN: <0.1 KU/L
LONDON PLANE IGE QN: <0.1 KU/L
MT JUNIPER IGE QN: <0.1 KU/L
P NOTATUM IGE QN: <0.1 KU/L
PECAN/HICK TREE IGE QN: <0.1 KU/L
ROACH IGE QN: <0.1 KU/L
SALTWORT IGE QN: <0.1 KU/L
SHEEP SORREL IGE QN: <0.1 KU/L
SILVER BIRCH IGE QN: <0.1 KU/L
TIMOTHY IGE QN: <0.1 KU/L
TOTAL IGE SMQN RAST: 4.92 KU/L
WHITE ASH IGE QN: <0.1 KU/L
WHITE ELM IGE QN: <0.1 KU/L
WHITE MULBERRY IGE QN: <0.1 KU/L
WHITE OAK IGE QN: <0.1 KU/L

## 2024-12-12 RX ORDER — FERROUS SULFATE 325(65) MG
65 TABLET ORAL
Qty: 180 TABLET | Refills: 0 | Status: SHIPPED | OUTPATIENT
Start: 2024-12-12 | End: 2025-03-12

## 2024-12-12 RX ORDER — ERGOCALCIFEROL 1.25 MG/1
1.25 CAPSULE ORAL
Qty: 12 CAPSULE | Refills: 0 | Status: SHIPPED | OUTPATIENT
Start: 2024-12-15 | End: 2025-03-09

## 2024-12-12 NOTE — TELEPHONE ENCOUNTER
Radiology called in regards to the Cardia Scoring order that was placed on 12/10. Expiration is for March of 2025, however they're booking out passed that, so she's asking if a new order can be put in or if the expiration date can be changed.

## 2024-12-13 NOTE — SIGNIFICANT EVENT
Follow Up Phone Call    Outgoing phone call    Spoke to: Gisselle Avalos Relationship:self   Phone number: [phone]      Outcome: contacted patient/ family   Chief Complaint   Patient presents with   • Shortness of Breath          Diagnosis:Not applicable    States she is feeling better. No further questions or concerns.

## 2024-12-15 LAB
ATRIAL RATE: 98 BPM
P AXIS: 78 DEGREES
P OFFSET: 196 MS
P ONSET: 145 MS
PR INTERVAL: 148 MS
Q ONSET: 219 MS
Q ONSET: 221 MS
QRS COUNT: 17 BEATS
QRS COUNT: 28 BEATS
QRS DURATION: 136 MS
QRS DURATION: 90 MS
QT INTERVAL: 282 MS
QT INTERVAL: 356 MS
QTC CALCULATION(BAZETT): 454 MS
QTC CALCULATION(BAZETT): 477 MS
QTC FREDERICIA: 400 MS
QTC FREDERICIA: 419 MS
R AXIS: 61 DEGREES
R AXIS: 77 DEGREES
T AXIS: 67 DEGREES
T AXIS: 72 DEGREES
T OFFSET: 362 MS
T OFFSET: 397 MS
VENTRICULAR RATE: 172 BPM
VENTRICULAR RATE: 98 BPM

## 2024-12-17 DIAGNOSIS — G47.33 OSA (OBSTRUCTIVE SLEEP APNEA): ICD-10-CM

## 2024-12-18 LAB
A FUMIGATUS1 AB SER QL ID: DETECTED
A FUMIGATUS6 AB SER QL ID: ABNORMAL
A PULLULANS AB SER QL ID: ABNORMAL
PIGEON SERUM AB QL ID: ABNORMAL
S RECTIVIRGULA AB SER QL ID: ABNORMAL

## 2024-12-19 ENCOUNTER — HOSPITAL ENCOUNTER (OUTPATIENT)
Dept: CARDIOLOGY | Facility: HOSPITAL | Age: 54
Discharge: HOME | End: 2024-12-19
Payer: MEDICARE

## 2024-12-19 ENCOUNTER — HOSPITAL ENCOUNTER (OUTPATIENT)
Dept: RADIOLOGY | Facility: HOSPITAL | Age: 54
Discharge: HOME | End: 2024-12-19
Payer: MEDICARE

## 2024-12-19 DIAGNOSIS — I47.10 SUPRAVENTRICULAR TACHYCARDIA (CMS-HCC): ICD-10-CM

## 2024-12-19 DIAGNOSIS — R07.9 CHEST PAIN, UNSPECIFIED TYPE: ICD-10-CM

## 2024-12-19 DIAGNOSIS — R07.89 ATYPICAL CHEST PAIN: ICD-10-CM

## 2024-12-19 DIAGNOSIS — R00.2 PALPITATIONS: ICD-10-CM

## 2024-12-19 PROCEDURE — 93246 EXT ECG>7D<15D RECORDING: CPT

## 2024-12-19 PROCEDURE — A9502 TC99M TETROFOSMIN: HCPCS | Performed by: NURSE PRACTITIONER

## 2024-12-19 PROCEDURE — 78452 HT MUSCLE IMAGE SPECT MULT: CPT | Performed by: STUDENT IN AN ORGANIZED HEALTH CARE EDUCATION/TRAINING PROGRAM

## 2024-12-19 PROCEDURE — 93017 CV STRESS TEST TRACING ONLY: CPT

## 2024-12-19 PROCEDURE — 3430000001 HC RX 343 DIAGNOSTIC RADIOPHARMACEUTICALS: Performed by: NURSE PRACTITIONER

## 2024-12-19 PROCEDURE — 78452 HT MUSCLE IMAGE SPECT MULT: CPT

## 2024-12-19 PROCEDURE — 2500000004 HC RX 250 GENERAL PHARMACY W/ HCPCS (ALT 636 FOR OP/ED): Performed by: INTERNAL MEDICINE

## 2024-12-19 RX ORDER — REGADENOSON 0.08 MG/ML
0.4 INJECTION, SOLUTION INTRAVENOUS ONCE
Status: COMPLETED | OUTPATIENT
Start: 2024-12-19 | End: 2024-12-19

## 2024-12-24 ENCOUNTER — APPOINTMENT (OUTPATIENT)
Dept: RADIOLOGY | Facility: HOSPITAL | Age: 54
End: 2024-12-24
Payer: MEDICARE

## 2024-12-24 DIAGNOSIS — Z12.31 SCREENING MAMMOGRAM FOR BREAST CANCER: ICD-10-CM

## 2024-12-26 ENCOUNTER — TELEPHONE (OUTPATIENT)
Dept: SLEEP MEDICINE | Facility: HOSPITAL | Age: 54
End: 2024-12-26
Payer: MEDICARE

## 2024-12-26 DIAGNOSIS — E55.9 VITAMIN D DEFICIENCY: ICD-10-CM

## 2024-12-26 DIAGNOSIS — E83.10 DISORDER OF IRON METABOLISM: ICD-10-CM

## 2024-12-26 NOTE — TELEPHONE ENCOUNTER
Left VM for patient to return sleep nurse call at [phone] to update her med list and see if she has started her Iron supplementation.

## 2024-12-26 NOTE — TELEPHONE ENCOUNTER
----- Message from Deidre Knutson sent at 12/12/2024  4:45 PM EST -----  Already called patient to let that TSAT and vitamin D are both low and since she gets RLS every night, recommend starting iron pills BID. Rx sent to pharmacy. Also if RLS symptoms persist despite iron supplementation, advised her to go up on her gabapentin at 100 mg increments per week until she gets to the dose which makes RLS much better with less side effects. Discussed that per research, most people get benefit at gabapentin 900 mg to 1800 mg. She states she currently takes gabapentin 300mg at . The current med list is not accurate. Pls edit med list and pend orders for repeat ferritin, TSAT, and vit D in 3 mos for me. Thank you

## 2025-01-06 ENCOUNTER — HOSPITAL ENCOUNTER (OUTPATIENT)
Dept: RADIOLOGY | Facility: HOSPITAL | Age: 55
Discharge: HOME | End: 2025-01-06
Payer: MEDICARE

## 2025-01-06 ENCOUNTER — HOSPITAL ENCOUNTER (OUTPATIENT)
Dept: RESPIRATORY THERAPY | Facility: HOSPITAL | Age: 55
Discharge: HOME | End: 2025-01-06
Payer: MEDICARE

## 2025-01-06 ENCOUNTER — PATIENT OUTREACH (OUTPATIENT)
Dept: PRIMARY CARE | Facility: CLINIC | Age: 55
End: 2025-01-06
Payer: MEDICARE

## 2025-01-06 VITALS — HEIGHT: 69 IN | BODY MASS INDEX: 35.7 KG/M2 | WEIGHT: 241 LBS

## 2025-01-06 DIAGNOSIS — R06.02 SOB (SHORTNESS OF BREATH) ON EXERTION: ICD-10-CM

## 2025-01-06 DIAGNOSIS — C34.90 MALIGNANT NEOPLASM OF UNSPECIFIED PART OF UNSPECIFIED BRONCHUS OR LUNG (MULTI): ICD-10-CM

## 2025-01-06 DIAGNOSIS — R91.1 LUNG NODULE SEEN ON IMAGING STUDY: ICD-10-CM

## 2025-01-06 DIAGNOSIS — Z12.31 SCREENING MAMMOGRAM FOR BREAST CANCER: ICD-10-CM

## 2025-01-06 LAB
MGC ASCENT PFT - FEV1 - POST: 2.98
MGC ASCENT PFT - FEV1 - PRE: 2.83
MGC ASCENT PFT - FEV1 - PREDICTED: 2.95
MGC ASCENT PFT - FVC - POST: 3.64
MGC ASCENT PFT - FVC - PRE: 3.8
MGC ASCENT PFT - FVC - PREDICTED: 3.74

## 2025-01-06 PROCEDURE — 94060 EVALUATION OF WHEEZING: CPT | Performed by: INTERNAL MEDICINE

## 2025-01-06 PROCEDURE — 94060 EVALUATION OF WHEEZING: CPT

## 2025-01-06 PROCEDURE — 77067 SCR MAMMO BI INCL CAD: CPT | Performed by: RADIOLOGY

## 2025-01-06 PROCEDURE — 94726 PLETHYSMOGRAPHY LUNG VOLUMES: CPT

## 2025-01-06 PROCEDURE — 77067 SCR MAMMO BI INCL CAD: CPT

## 2025-01-06 PROCEDURE — 94760 N-INVAS EAR/PLS OXIMETRY 1: CPT

## 2025-01-06 PROCEDURE — 94664 DEMO&/EVAL PT USE INHALER: CPT | Mod: 59

## 2025-01-06 PROCEDURE — 77063 BREAST TOMOSYNTHESIS BI: CPT | Performed by: RADIOLOGY

## 2025-01-06 PROCEDURE — 94729 DIFFUSING CAPACITY: CPT | Performed by: INTERNAL MEDICINE

## 2025-01-06 PROCEDURE — 94726 PLETHYSMOGRAPHY LUNG VOLUMES: CPT | Performed by: INTERNAL MEDICINE

## 2025-01-06 PROCEDURE — 94729 DIFFUSING CAPACITY: CPT

## 2025-01-06 PROCEDURE — 78815 PET IMAGE W/CT SKULL-THIGH: CPT | Mod: PI

## 2025-01-06 PROCEDURE — 3430000001 HC RX 343 DIAGNOSTIC RADIOPHARMACEUTICALS: Performed by: INTERNAL MEDICINE

## 2025-01-06 PROCEDURE — A9552 F18 FDG: HCPCS | Performed by: INTERNAL MEDICINE

## 2025-01-06 PROCEDURE — 78815 PET IMAGE W/CT SKULL-THIGH: CPT | Mod: PET TUMOR INIT TX STRAT | Performed by: RADIOLOGY

## 2025-01-06 RX ORDER — FLUDEOXYGLUCOSE F 18 200 MCI/ML
11.7 INJECTION, SOLUTION INTRAVENOUS
Status: COMPLETED | OUTPATIENT
Start: 2025-01-06 | End: 2025-01-06

## 2025-01-06 RX ADMIN — FLUDEOXYGLUCOSE F 18 11.7 MILLICURIE: 200 INJECTION, SOLUTION INTRAVENOUS at 07:49

## 2025-01-08 ENCOUNTER — TELEPHONE (OUTPATIENT)
Dept: CARDIOLOGY | Facility: HOSPITAL | Age: 55
End: 2025-01-08
Payer: MEDICARE

## 2025-01-08 ENCOUNTER — TELEPHONE (OUTPATIENT)
Dept: PRIMARY CARE | Facility: CLINIC | Age: 55
End: 2025-01-08
Payer: MEDICARE

## 2025-01-08 DIAGNOSIS — I47.10 SUPRAVENTRICULAR TACHYCARDIA (CMS-HCC): ICD-10-CM

## 2025-01-08 NOTE — TELEPHONE ENCOUNTER
Cj Lloyd I did contact pt informed her results she saw are from testing done By Dr Armstrong Pul[facility], and that  would be treating her for condition since he is Pulmonology

## 2025-01-10 RX ORDER — METOPROLOL SUCCINATE 25 MG/1
25 TABLET, EXTENDED RELEASE ORAL DAILY
Qty: 90 TABLET | Refills: 0 | Status: SHIPPED | OUTPATIENT
Start: 2025-01-10 | End: 2025-04-10

## 2025-01-20 ENCOUNTER — HOSPITAL ENCOUNTER (OUTPATIENT)
Dept: RADIOLOGY | Facility: HOSPITAL | Age: 55
Discharge: HOME | End: 2025-01-20
Payer: MEDICARE

## 2025-01-20 DIAGNOSIS — E78.5 HYPERLIPIDEMIA, UNSPECIFIED HYPERLIPIDEMIA TYPE: ICD-10-CM

## 2025-01-20 PROCEDURE — 75571 CT HRT W/O DYE W/CA TEST: CPT

## 2025-01-21 ENCOUNTER — TELEPHONE (OUTPATIENT)
Dept: PRIMARY CARE | Facility: CLINIC | Age: 55
End: 2025-01-21
Payer: MEDICARE

## 2025-01-21 NOTE — TELEPHONE ENCOUNTER
"----- Message from Alex Crowley sent at 1/21/2025  8:59 AM EST -----  Please make sure patient is aware of the comments or MyChart message.    The radiologist reports lung nodules.  Recommend smoking cessation, annual lung cancer screening, and continued follow-up with your pulmonologist.    CT Coronary Artery Calcium (CAC) score is ever so slightly elevated, at 0.56. Could consider taking a statin (e.g., Lipitor/atorvastatin, Crestor/rosuvastatin), especially age >= 55 and <= 75.  The CT coronary artery calcium (CAC) score is calculated from a CT scan of the heart done without contrast and synchronized with the heartbeat to quantify any calcifications in the coronary arteries. The CAC score can help to estimate the risk of heart attack, and can help guide therapy decisions (e.g., whether or not to start a statin, see a cardiologist). The CT will miss significant lesions about 5% of the time. False positives are rare, but can happen. CT CAC scoring can not identify narrowing of the arteries. There is a possibility of identifying things that may have otherwise gone unnoticed, which may in turn lead to \"unnecessary\" testing, procedures, referrals, anxiety, etc. The CAC score is generally not repeated more frequently than every 5 years.  Please return for a follow-up appointment, to discuss results and options, if any question or concern. As always, recommend a low-sugar, low-fat, low-cholesterol, high-fiber, heart-healthy diet and lifestyle, maintaining a healthy blood pressure, and regular cardio exercise and weight loss as appropriate.  Highlights of possible Lipitor/atorvastatin side effects:  Diarrhea (7-14%), joint pain (9-12%), muscle pain (3-8%), stuffy nose or sore throat (13%), diabetes (6%), nausea (7%), UTI (7-8%), insomnia (5%), hemorrhagic stroke (2%), increased liver enzymes (2%), muscle injury, liver failure, interstitial lung disease, serious allergic reaction.  "

## 2025-01-21 NOTE — TELEPHONE ENCOUNTER
Result Communication    Resulted Orders   CT cardiac scoring wo IV contrast    Narrative    Interpreted By:  Adyee Caban and Fatima Shumail   STUDY:  CT CARDIAC SCORING WO IV CONTRAST; 1/20/2025 8:14 am      INDICATION:  Signs/Symptoms:FMHx early CAD.      COMPARISON:  None.      ACCESSION NUMBER(S):  [id number]      ORDERING CLINICIAN:  IVÁN STOVER      TECHNIQUE:  Using prospective ECG gating, CT scan of the coronary arteries was  performed without intravenous contrast. Coronary calcium scoring  was  performed according to the method of Agatston.      FINDINGS:  The score and distribution of calcium in the coronary arteries is as  follows:      LM 0,  LAD 0.56,  LCx 0,  RCA 0,      Total 0.56      The visualized ascending thoracic aorta measures 2.9 cm in diameter.  The heart is normal in size. No pericardial effusion is present. Mild  descending aorta calcification present.      No gross evidence of mediastinal or hilar lymphadenopathy or masses  is identified. There is a 5.6 mm noncalcified pulmonary nodule in the  right upper lobe (image 2 of 66), which is stable compared to the  prior CT chest performed 12/07/2024. There is a 4.8 mm noncalcified  pulmonary nodule along the oblique fissure of the right lung (image  13 of 66), probable fissural lymph node.      The visualized subdiaphragmatic structures appear intact.        Impression    1. Coronary artery calcium score of 0.56*.  2. There is a 5.6 mm noncalcified pulmonary nodule in the right upper  lobe.  Incidental Finding:  A non-calcified pulmonary nodule/multiple  non-calcified pulmonary nodules measuring less than 6 mm, likely  benign.  (**-YCF-**)      Instructions:  No further follow-up is required, however, if the  patient has high risk factors for primary lung malignancy, follow-up  noncontrast CT scan chest in 12 months may be obtained. (Rajesh Hamlin et al., Guidelines for management of incidental pulmonary  nodules detected on CT  images: From the Fleischner Society 2017,  Radiology. 2017 Jul;284 (1):228-243.) [email or web address]      *Coronary Artery  Agatston score      Score  risk  Very low 1-99  Mildly increased 100-299  Moderately increased >300  Moderate to severely increased >800      Mimi et al. JCCT 2016 (http://dx.doi.org/10.1016/j.jcct.[email or web address])      DAVIS 10-Year CHD Risk with Coronary Artery Calcification can be  calcuate using link below  [email or web address]  Adal et al. JACC 2015 (http://dx.doi.org/10.1016/j.j  acc.2015.08.035)      Signed by: Aydee Caban 1/20/2025 8:03 PM  Dictation workstation:   [id number]       1:32 PM      Results were successfully communicated with the patient and they acknowledged their understanding.

## 2025-01-27 ENCOUNTER — OFFICE VISIT (OUTPATIENT)
Dept: CARDIOLOGY | Facility: HOSPITAL | Age: 55
End: 2025-01-27
Payer: MEDICARE

## 2025-01-27 VITALS
DIASTOLIC BLOOD PRESSURE: 84 MMHG | HEART RATE: 91 BPM | BODY MASS INDEX: 35.23 KG/M2 | WEIGHT: 238.54 LBS | OXYGEN SATURATION: 97 % | SYSTOLIC BLOOD PRESSURE: 134 MMHG

## 2025-01-27 DIAGNOSIS — I47.10 SUPRAVENTRICULAR TACHYCARDIA (CMS-HCC): ICD-10-CM

## 2025-01-27 DIAGNOSIS — R07.9 CHEST PAIN, UNSPECIFIED TYPE: ICD-10-CM

## 2025-01-27 PROCEDURE — G2211 COMPLEX E/M VISIT ADD ON: HCPCS | Performed by: NURSE PRACTITIONER

## 2025-01-27 PROCEDURE — 99214 OFFICE O/P EST MOD 30 MIN: CPT | Performed by: NURSE PRACTITIONER

## 2025-01-27 RX ORDER — METOPROLOL SUCCINATE 25 MG/1
25 TABLET, EXTENDED RELEASE ORAL DAILY
Qty: 90 TABLET | Refills: 3 | Status: SHIPPED | OUTPATIENT
Start: 2025-01-27 | End: 2026-01-27

## 2025-01-27 ASSESSMENT — ENCOUNTER SYMPTOMS
ENDOCRINE NEGATIVE: 1
PSYCHIATRIC NEGATIVE: 1
MYALGIAS: 1
CONSTITUTIONAL NEGATIVE: 1
NEUROLOGICAL NEGATIVE: 1
HEMATOLOGIC/LYMPHATIC NEGATIVE: 1
RESPIRATORY NEGATIVE: 1
PALPITATIONS: 1
EYES NEGATIVE: 1
GASTROINTESTINAL NEGATIVE: 1

## 2025-01-27 NOTE — PATIENT INSTRUCTIONS
CALL WITH ANY QUESTIONS   START METOPROLOL ER 25 MG DAILY PLEASE TAKE IN THE EVENING   FOLLOW UP IN ONE YEAR

## 2025-01-27 NOTE — PROGRESS NOTES
Referred by Dr. Rodriguez for Hospital Follow-up (States she has not received the metoprolol tartrate from her mail order pharmacy.) and Results (ZIO.  Heart monitor.)     History Of Present Illness:    Gisselle Avalos is a very pleasant 54 year old female with a history of THIERRY, anxiety, GERD and SVT, she is here for a follow up visit. The patient is seen in collaboration with Dr. Wilson. Mrs. Avalos has had occasional shortness of breath. Since her admission to the hospital in December for pneumonia she has had episodes of heart palpitations. Currently following up with pulmonary for a possible fungal infection in her lungs. States she is trying to quit smoking. Continues to stay active, taking care of the animals.     Review of Systems   Constitutional: Negative.   HENT: Negative.     Eyes: Negative.    Cardiovascular:  Positive for palpitations.   Respiratory: Negative.     Endocrine: Negative.    Hematologic/Lymphatic: Negative.    Skin: Negative.    Musculoskeletal:  Positive for myalgias.   Gastrointestinal: Negative.    Neurological: Negative.    Psychiatric/Behavioral: Negative.          Past Medical History:  She has a past medical history of Acute bronchitis (01/26/2024), Acute sinusitis (08/07/2008), Backache (06/18/2009), Contracture of muscle, left upper arm (09/02/2021), Dog bite of lower leg (09/19/2022), Encounter for screening for malignant neoplasm of colon (09/02/2021), Encounter for screening for malignant neoplasm of colon (09/02/2021), Other specified abnormal findings of blood chemistry (06/23/2022), Pain in right knee (04/15/2022), Person with feared health complaint in whom no diagnosis is made (07/22/2022), Personal history of other medical treatment (09/02/2021), and Urinary tract infection, site not specified (07/25/2022).    Past Surgical History:  She has a past surgical history that includes Other surgical history (09/02/2021); Other surgical history (09/02/2021);  Cholecystectomy; and Hysterectomy (2003).      Social History:  She reports that she has been smoking cigarettes. She started smoking about 41 years ago. She has a 41.1 pack-year smoking history. She has never used smokeless tobacco. She reports current alcohol use. She reports that she does not currently use drugs after having used the following drugs: Marijuana.    Family History:  Family History   Problem Relation Name Age of Onset    Breast cancer Mother Netta bain     Arthritis Mother Netta bain     Asthma Mother Netta bain     Hearing loss Father Richard bain     Cancer Father Richard bain         unknown cancer type    Coronary artery disease Father Richard bain 52    Brain cancer Sister Nguyen     Heart disease Sister Nguyen     Breast cancer Sister Kely     Hypertension Sister Kely     Prostate cancer Brother x3     Diabetes Brother x3     Sleep apnea Daughter      Restless legs syndrome Daughter      Breast cancer Sister Adriana         Allergies:  Amitriptyline and Penicillins    Outpatient Medications:  Current Outpatient Medications   Medication Instructions    atorvastatin (LIPITOR) 10 mg, oral, Nightly    busPIRone (BUSPAR) 10 mg, oral, 2 times daily    diazePAM (Valium) 5 mg tablet TAKE 1-2 TABS PO ONE HOUR PRIOR TO PROCEDURE. MAY REPEAT UPON ARRIVAL TO PROCEDURE    ergocalciferol (VITAMIN D-2) 1,250 mcg, oral, Once Weekly    ferrous sulfate (325 mg ferrous sulfate) (IRON) 325 mg, oral, 2 times daily before meals, Please take iron pills with 1 glass of orange juice or vitamin C 250 mg 30 minutes before meals or 2 hours after a meal. Avoid taking iron with calcium (milk or dairy products) or thyroid pill.    gabapentin (NEURONTIN) 300 mg, oral, Nightly    MAGNESIUM GLYCINATE ORAL 2 capsules, Nightly    methocarbamol (ROBAXIN) 500 mg, oral, 3 times daily PRN, As needed for pain    metoprolol succinate XL (TOPROL-XL) 25 mg, oral, Daily, Do not crush or chew.    omeprazole (PRILOSEC) 40 mg,  oral, Daily RT    sertraline (ZOLOFT) 100 mg, oral, Daily    traZODone (DESYREL) 100 mg, oral, Nightly    varenicline (Chantix Starting Month Box) 0.5 mg (11)- 1 mg (42) tablet Day 1-3 take 0.5 mg once daily. Day 4-7 take 0.5 mg twice daily. Day 8 and following take 1 mg twice daily.    varenicline tartrate (CHANTIX CONTINUING MONTH BOX) 1 mg, oral, 2 times daily        Last Recorded Vitals:  Vitals:    01/27/25 1114   BP: 134/84   Pulse: 91   SpO2: 97%   Weight: 108 kg (238 lb 8.6 oz)       Physical Exam:  Physical Exam  Vitals reviewed.   HENT:      Head: Normocephalic.      Nose: Nose normal.   Eyes:      Pupils: Pupils are equal, round, and reactive to light.   Cardiovascular:      Rate and Rhythm: Normal rate and regular rhythm.   Pulmonary:      Effort: Pulmonary effort is normal.      Breath sounds: Normal breath sounds.   Abdominal:      General: Abdomen is flat.      Palpations: Abdomen is soft.   Musculoskeletal:         General: Normal range of motion.      Cervical back: Normal range of motion.   Skin:     General: Skin is warm and dry.   Neurological:      General: No focal deficit present.      Mental Status: He is alert and oriented to person, place, and time.   Psychiatric:         Mood and Affect: Mood normal.            Last Labs:  CBC -  Lab Results   Component Value Date    WBC 7.1 12/08/2024    HGB 12.9 12/08/2024    HCT 39.6 12/08/2024    MCV 90 12/08/2024     12/08/2024       CMP -  Lab Results   Component Value Date    CALCIUM 8.4 (L) 12/08/2024    PHOS 3.6 12/08/2024    PROT 6.4 12/07/2024    ALBUMIN 3.5 12/08/2024    AST 20 12/07/2024    ALT 26 12/07/2024    ALKPHOS 54 12/07/2024    BILITOT 0.3 12/07/2024       LIPID PANEL -   Lab Results   Component Value Date    CHOL 192 12/08/2024    TRIG 119 12/08/2024    HDL 45.2 12/08/2024    CHHDL 4.2 12/08/2024    LDLF 144 (H) 09/02/2021    VLDL 24 12/08/2024    NHDL 147 12/08/2024       RENAL FUNCTION PANEL -   Lab Results   Component Value  Date    GLUCOSE 105 (H) 12/08/2024     12/08/2024    K 4.0 12/08/2024     (H) 12/08/2024    CO2 23 12/08/2024    ANIONGAP 11 12/08/2024    BUN 14 12/08/2024    CREATININE 0.76 12/08/2024    CALCIUM 8.4 (L) 12/08/2024    PHOS 3.6 12/08/2024    ALBUMIN 3.5 12/08/2024        Lab Results   Component Value Date    BNP 42 12/08/2024    HGBA1C 5.7 (H) 12/08/2024       Last Cardiology Tests:  EKG  Echo:  Transthoracic Echo Complete 04/16/2024   1. Left ventricular systolic function is normal with a 55% estimated ejection fraction.   2. RVSP within normal limits.  Ejection Fractions:  LVEF 55%  Cath:    Stress Test:  Nuclear Stress Test 12/19/2024  1. Negative myocardial perfusion study without evidence of  stress-induced ischemia or prior infarction.  2. The left ventricle is normal in size.  3. Normal LV wall motion with a post-stress LV EF estimated greater  than 65%.  Cardiac Imaging:  CT cardiac scoring wo IV contrast 01/20/2025  LM 0,  LAD 0.56,  LCx 0,  RCA 0,      Total 0.56        Assessment/Plan   Mrs. Avalos is a very pleasant 54 year old female with a history of proximal SVT, GERD, and HLD, admitted in December 2024 with pneumonia, had an episode of SVT, adenosine used in the ER. Heart monitor showed sinus rhythm with three episodes of SVT, the longest lasting two hours and two minutes heart rate 190 bpm. She will be started on Metoprolol ER 25 mg daily. Heart rate and blood pressure are well controlled today     Plan   -call with any questions   -start Metoprolol ER 25 mg daily   -continue Atorvastatin 10 mg daily   -follow up in one year         Loren Guerra, APRN-CNP

## 2025-02-03 ENCOUNTER — OFFICE VISIT (OUTPATIENT)
Dept: PULMONOLOGY | Facility: CLINIC | Age: 55
End: 2025-02-03
Payer: MEDICARE

## 2025-02-03 VITALS
BODY MASS INDEX: 36.06 KG/M2 | OXYGEN SATURATION: 97 % | HEART RATE: 82 BPM | WEIGHT: 244.2 LBS | RESPIRATION RATE: 16 BRPM | DIASTOLIC BLOOD PRESSURE: 72 MMHG | SYSTOLIC BLOOD PRESSURE: 108 MMHG

## 2025-02-03 DIAGNOSIS — J98.4 SMALL AIRWAYS DISEASE: Primary | ICD-10-CM

## 2025-02-03 DIAGNOSIS — R91.1 LUNG NODULE: ICD-10-CM

## 2025-02-03 DIAGNOSIS — G47.33 OSA ON CPAP: ICD-10-CM

## 2025-02-03 PROCEDURE — 99214 OFFICE O/P EST MOD 30 MIN: CPT | Performed by: INTERNAL MEDICINE

## 2025-02-03 RX ORDER — FLUTICASONE FUROATE AND VILANTEROL 100; 25 UG/1; UG/1
1 POWDER RESPIRATORY (INHALATION) DAILY
Qty: 1 EACH | Refills: 3 | Status: SHIPPED | OUTPATIENT
Start: 2025-02-03

## 2025-02-03 RX ORDER — ALBUTEROL SULFATE 90 UG/1
2 INHALANT RESPIRATORY (INHALATION) EVERY 4 HOURS PRN
Qty: 8.5 G | Refills: 5 | Status: SHIPPED | OUTPATIENT
Start: 2025-02-03 | End: 2026-02-03

## 2025-02-03 ASSESSMENT — COLUMBIA-SUICIDE SEVERITY RATING SCALE - C-SSRS
6. HAVE YOU EVER DONE ANYTHING, STARTED TO DO ANYTHING, OR PREPARED TO DO ANYTHING TO END YOUR LIFE?: NO
2. HAVE YOU ACTUALLY HAD ANY THOUGHTS OF KILLING YOURSELF?: NO
1. IN THE PAST MONTH, HAVE YOU WISHED YOU WERE DEAD OR WISHED YOU COULD GO TO SLEEP AND NOT WAKE UP?: NO

## 2025-02-03 ASSESSMENT — PAIN SCALES - GENERAL: PAINLEVEL_OUTOF10: 0-NO PAIN

## 2025-02-03 NOTE — PROGRESS NOTES
Department of Medicine  Division of Pulmonary, Critical Care, and Sleep Medicine  Consultation  [facility] - [address], [address]    I was asked by No ref. provider found, to evaluate Ms. Gisselle Avalos for lung nodules. I have independently interviewed and examined the patient in the office and reviewed available records.    Physician JUAN (2/3/2025):   Pleasant 54-year-old woman presenting for follow-up on dyspnea, lung nodules.  Still with dyspnea, think it could be related to her tachycardia, started recently on metoprolol.  Otherwise no changes since last visit, no productive cough, no chest pain.    Still smoking but down to half pack per day, history of 30-pack-year.  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  No history of asbestos exposure.  No history of normal toxic drugs.          Immunization History   Administered Date(s) Administered    Flu vaccine (IIV4), preservative free *Check age/dose* 11/08/2021, 12/07/2022, 11/22/2023    Flu vaccine, trivalent, preservative free, no egg protein, age 6 months or greater (Flucelvax) 09/16/2024    Influenza, Unspecified 11/18/2012, 01/10/2014    Influenza, seasonal, injectable 12/18/2014    Moderna COVID-19 vaccine, 12 years and older (50mcg/0.5mL)(Spikevax) 12/13/2023, 09/16/2024    Moderna COVID-19 vaccine, bivalent, blue cap/gray label *Check age/dose* 12/07/2022    Moderna SARS-CoV-2 Vaccination 11/08/2021    Pneumococcal conjugate vaccine, 20-valent (PREVNAR 20) 11/22/2023    Pneumococcal polysaccharide vaccine, 23-valent, age 2 years and older (PNEUMOVAX 23) 01/10/2014    Tdap vaccine, age 7 year and older (BOOSTRIX, ADACEL) 09/08/2022    Zoster vaccine, recombinant, adult (SHINGRIX) 12/13/2023, 09/16/2024       Past Medical History:   Diagnosis Date    Acute bronchitis 01/26/2024    Acute sinusitis 08/07/2008    Backache 06/18/2009    Contracture of muscle, left upper arm 09/02/2021    Contracture of muscle  of left upper arm    Dog bite of lower leg 09/19/2022    Encounter for screening for malignant neoplasm of colon 09/02/2021    Colon cancer screening    Encounter for screening for malignant neoplasm of colon 09/02/2021    Colon cancer screening    Other specified abnormal findings of blood chemistry 06/23/2022    D-dimer, elevated    Pain in right knee 04/15/2022    Right knee pain    Person with feared health complaint in whom no diagnosis is made 07/22/2022    Concern about cardiovascular disease without diagnosis    Personal history of other medical treatment 09/02/2021    History of screening mammography    Urinary tract infection, site not specified 07/25/2022    Acute UTI       Past Surgical History:   Procedure Laterality Date    CHOLECYSTECTOMY      HYSTERECTOMY  2003    OTHER SURGICAL HISTORY  09/02/2021    Gallbladder surgery    OTHER SURGICAL HISTORY  09/02/2021    Hysterectomy       Family History   Problem Relation Name Age of Onset    Breast cancer Mother Netta bain     Arthritis Mother Netta bain     Asthma Mother Netta bain     Hearing loss Father Richard bain     Cancer Father Richard bain         unknown cancer type    Coronary artery disease Father Richard bain 52    Brain cancer Sister Nguyen     Heart disease Sister Nguyen     Breast cancer Sister Kely     Hypertension Sister Kely     Prostate cancer Brother x3     Diabetes Brother x3     Sleep apnea Daughter      Restless legs syndrome Daughter      Breast cancer Sister Adriana        Social History     Tobacco Use    Smoking status: Every Day     Current packs/day: 1.00     Average packs/day: 1 pack/day for 41.1 years (41.1 ttl pk-yrs)     Types: Cigarettes     Start date: 1/1/1984    Smokeless tobacco: Never   Vaping Use    Vaping status: Never Used   Substance Use Topics    Alcohol use: Yes     Comment: 1a month    Drug use: Not Currently     Types: Marijuana     Comment: in the 80's       Occupational & Environmental History:         Current Medications:  Current Outpatient Medications   Medication Instructions    atorvastatin (LIPITOR) 10 mg, oral, Nightly    busPIRone (BUSPAR) 10 mg, oral, 2 times daily    diazePAM (Valium) 5 mg tablet TAKE 1-2 TABS PO ONE HOUR PRIOR TO PROCEDURE. MAY REPEAT UPON ARRIVAL TO PROCEDURE    ergocalciferol (VITAMIN D-2) 1,250 mcg, oral, Once Weekly    ferrous sulfate (325 mg ferrous sulfate) (IRON) 325 mg, oral, 2 times daily before meals, Please take iron pills with 1 glass of orange juice or vitamin C 250 mg 30 minutes before meals or 2 hours after a meal. Avoid taking iron with calcium (milk or dairy products) or thyroid pill.    gabapentin (NEURONTIN) 300 mg, oral, Nightly    MAGNESIUM GLYCINATE ORAL 2 capsules, Nightly    methocarbamol (ROBAXIN) 500 mg, oral, 3 times daily PRN, As needed for pain    metoprolol succinate XL (TOPROL-XL) 25 mg, oral, Daily, Do not crush or chew.    omeprazole (PRILOSEC) 40 mg, oral, Daily RT    sertraline (ZOLOFT) 100 mg, oral, Daily    traZODone (DESYREL) 100 mg, oral, Nightly    varenicline (Chantix Starting Month Box) 0.5 mg (11)- 1 mg (42) tablet Day 1-3 take 0.5 mg once daily. Day 4-7 take 0.5 mg twice daily. Day 8 and following take 1 mg twice daily.    varenicline tartrate (CHANTIX CONTINUING MONTH BOX) 1 mg, oral, 2 times daily        Drug Allergies/Intolerances:  Allergies   Allergen Reactions    Amitriptyline Insomnia    Penicillins Unknown     able to take amoxicillin        Visit Vitals  /72   Pulse 82   Resp 16   Wt 111 kg (244 lb 3.2 oz)   SpO2 97%   BMI 36.06 kg/m²   OB Status Hysterectomy   Smoking Status Every Day   BSA 2.32 m²        Physical exam  Constitutional: Normal appearance.  HEENT: Normocephalic and atraumatic.  Cardiovascular: Normal rate and regular rhythm.  Pulmonary: Normal respiratory effort, bilateral clear breath sounds, no wheezing or rhonchi.  Musculoskeletal: No edema, no cyanosis.  Neurological: Awake, alert and oriented  "x3.  Psychiatric: Normal behavior, mood and affect.    Pulmonary Function Test Results     Pulmonary Functions Testing Results:    No results found for: \"FEV1\", \"FVC\", \"BBD1IJT\", \"TLC\", \"DLCO\"     Chest Radiograph     CHEST 2 VIEW 06/21/2022    Narrative  MRN: [id number]  Patient Name: ESTUARDO LUTHER    STUDY:   CHEST 2 VIEW PA AND LAT;  6/21/2022 11:53 am    INDICATION:  sob, anxiety  R06.02: SOB (shortness of breath) on exertion.    COMPARISON:  No priors    ACCESSION NUMBER(S):  [id number]    ORDERING CLINICIAN:  CHEN DONIS    FINDINGS:  There is no focal lung consolidation or effusion. There is no edema.  The cardiac silhouette is within normal limits for size. Note is made  of multiple remote left rib fracture deformities.    Impression  No acute cardiopulmonary process.      Echocardiogram     No results found for this or any previous visit from the past 365 days.       Chest CT Scan     CT angio chest for pulmonary embolism 12/07/2024    Narrative  Interpreted By:  Paul Keith,  STUDY:  CT ANGIO CHEST FOR PULMONARY EMBOLISM;  12/7/2024 11:54 pm    INDICATION:  Signs/Symptoms:sob and tachy.    COMPARISON:  Correlation made with CT thoracic spine.    ACCESSION NUMBER(S):  [id number]    ORDERING CLINICIAN:  SCOTT ANGELA    TECHNIQUE:  Helical data acquisition of the chest was obtained contrast volume:  81 mL IV contrast Omnipaque 350.    Axial contiguous images were reformatted in coronal and sagittal  planes. Axial and coronal MIP images were created and reviewed.    FINDINGS:  POTENTIAL LIMITATIONS OF THE STUDY: Motion and mixing artifact which  limits evaluation of the distal branch vessels.    HEART AND VESSELS:  No discrete filling defects within the main pulmonary artery or its  branches.    Main pulmonary artery and its branches are normal in caliber.    The thoracic aorta is of normal course and caliber.    No coronary artery calcifications are seen.The study is not optimized  for evaluation of " coronary arteries.    The cardiac chambers are not enlarged.    No evidence of pericardial effusion.    MEDIASTINUM AND LUCIUS, LOWER NECK AND AXILLA:  The visualized thyroid gland is within normal limits.    There is prominent right hilar lymph node measuring 1.6 cm on axial  image 99, nonspecific. Otherwise no mediastinal lymphadenopathy.    Esophagus appears within normal limits as seen.    LUNGS AND AIRWAYS:  The trachea and central airways are patent. No endobronchial lesion.  Nonspecific diffuse interseptal thickening and mosaic attenuation of  the lung fields which may be seen in the setting of interstitial  infectious or inflammatory process or in the setting of pulmonary  edema. No focal consolidation. There is suggestion of slightly  increasing solid pulmonary nodule in the basilar right lower lobe  measuring 1.1 cm. There is also a 6 mm right upper lobe pulmonary  nodule on axial image 79 and additional 5 mm solid pulmonary nodule  in the anterior right upper lobe on image 104.. There is also  unchanged 5 mm pulmonary nodule along the right major fissure in the  lower lobe on axial image 109.    UPPER ABDOMEN:  There is hepatomegaly. Cholecystectomy change.    CHEST WALL AND OSSEOUS STRUCTURES:  There are no suspicious osseous lesions. The visualized osseous  structures are intact. Mild endplate degenerative change in the lower  thoracic spine.    Impression  1.  No evidence of pulmonary emboli.  2. Nonspecific diffuse interseptal thickening and mosaic attenuation  of the lung fields with dependent predominance, which may be seen  with interstitial infectious or inflammatory process or interstitial  edema. There is also mildly prominent right hilar lymph node and  multiple solid pulmonary nodules in the right lung measuring up to  1.1 cm in the basilar right lower lobe. These findings may be seen in  the setting of inflammatory process such as sarcoidosis, however are  entirely nonspecific. Pulmonology  "follow-up and chest CT or PET-CT in  3 months is recommended.      Signed by: Paul Keith 12/8/2024 12:50 AM  Dictation workstation:   [id number]       Laboratory Studies     Lab Results   Component Value Date    WBC 7.1 12/08/2024    HGB 12.9 12/08/2024    HCT 39.6 12/08/2024    MCV 90 12/08/2024     12/08/2024      Lab Results   Component Value Date    GLUCOSE 105 (H) 12/08/2024    CALCIUM 8.4 (L) 12/08/2024     12/08/2024    K 4.0 12/08/2024    CO2 23 12/08/2024     (H) 12/08/2024    BUN 14 12/08/2024    CREATININE 0.76 12/08/2024      Lab Results   Component Value Date    ALT 26 12/07/2024    AST 20 12/07/2024    ALKPHOS 54 12/07/2024    BILITOT 0.3 12/07/2024      No results found for: \"PROTIME\", \"INR\", \"PTT\"  Immunocap IgE   Date/Time Value Ref Range Status   12/11/2024 02:31 PM 4.92 <=214 KU/L Final     Comment:     Note: Omalizumab (Xolair, [employer]; humanized  IgG1 antihuman IgE Fc) treatment does not  significantly interfere with the accuracy of  total IgE on the ImmunoCAP ([employer]) platform.  J Allergy Clin Immunol 2006;117:759-66).  Allergens, parasitic diseases, smoking, and  alcohol consumption have been reported to  increase levels of total IgE in serum.     Aspergillus Fumigatus IgE   Date/Time Value Ref Range Status   12/11/2024 02:31 PM <0.10 <0.10 kU/L Final       Sputum Culture     No results found for: \"AFBCX\"   No results found for: \"RESPCULTCYFI\"  No results found for the last 90 days.          Assessment and Plan / Recommendations   Pleasant 54-year-old woman presenting for follow-up on dyspnea, lung nodules.    1.  Mosaic pattern on CT with dyspnea, PFT is normal.  Will start a trial of Breo and ProAir for possible small airway disease/asthma, if no improvement in symptoms plan to stop inhalers.  Strongly encouraged to quit smoking    2.  Lung nodule-low uptake on PET scan, follow-up with CT chest without contrast in March    3. Moderate sleep apnea, CPAP 7-12 ordered " last visit, still in process of getting CPAP    Return to clinic in 2-3 months or sooner with any concerns.    This dictation was created using voice recognition software. Phonetic and/or minor grammatical errors may exist.       Albina Alexis MD  02/03/2025

## 2025-02-25 DIAGNOSIS — F41.9 ANXIETY: ICD-10-CM

## 2025-02-26 RX ORDER — SERTRALINE HYDROCHLORIDE 100 MG/1
100 TABLET, FILM COATED ORAL DAILY
Qty: 90 TABLET | Refills: 0 | Status: SHIPPED | OUTPATIENT
Start: 2025-02-26

## 2025-02-27 ENCOUNTER — OFFICE VISIT (OUTPATIENT)
Dept: CARDIOLOGY | Facility: HOSPITAL | Age: 55
End: 2025-02-27
Payer: MEDICARE

## 2025-02-27 VITALS
OXYGEN SATURATION: 98 % | WEIGHT: 246.03 LBS | HEART RATE: 89 BPM | BODY MASS INDEX: 36.33 KG/M2 | DIASTOLIC BLOOD PRESSURE: 75 MMHG | SYSTOLIC BLOOD PRESSURE: 111 MMHG

## 2025-02-27 DIAGNOSIS — I47.19 AVNRT (AV NODAL RE-ENTRY TACHYCARDIA) (CMS-HCC): Primary | ICD-10-CM

## 2025-02-27 LAB
ATRIAL RATE: 86 BPM
P AXIS: 63 DEGREES
P OFFSET: 210 MS
P ONSET: 150 MS
PR INTERVAL: 140 MS
Q ONSET: 220 MS
QRS COUNT: 14 BEATS
QRS DURATION: 90 MS
QT INTERVAL: 354 MS
QTC CALCULATION(BAZETT): 423 MS
QTC FREDERICIA: 399 MS
R AXIS: 70 DEGREES
T AXIS: 19 DEGREES
T OFFSET: 397 MS
VENTRICULAR RATE: 86 BPM

## 2025-02-27 PROCEDURE — 99214 OFFICE O/P EST MOD 30 MIN: CPT | Mod: 25 | Performed by: STUDENT IN AN ORGANIZED HEALTH CARE EDUCATION/TRAINING PROGRAM

## 2025-02-27 PROCEDURE — 93005 ELECTROCARDIOGRAM TRACING: CPT | Performed by: STUDENT IN AN ORGANIZED HEALTH CARE EDUCATION/TRAINING PROGRAM

## 2025-02-27 PROCEDURE — 99204 OFFICE O/P NEW MOD 45 MIN: CPT | Performed by: STUDENT IN AN ORGANIZED HEALTH CARE EDUCATION/TRAINING PROGRAM

## 2025-02-27 NOTE — PROGRESS NOTES
Subjective   Gisselle Avalos is a 54 y.o. female.    Chief Complaint:  SVT    54 year old female presents today for evaluation of palpitations, tachycardia, and follow up of holter monitor results.  PMH includes HLD, Syncope, GERD, anxiety, and palpitations since the 1980s    Echo 4/2024:  1. Left ventricular systolic function is normal with a 55% estimated ejection fraction. 2. RVSP within normal limits.  Nuclear stress test 12/2024: 1. Negative myocardial perfusion study without evidence of stress-induced ischemia or prior infarction. 2. The left ventricle is normal in size.  3. Normal LV wall motion with a post-stress LV EF estimated greater than 65%.    Holter monitor 12/19/24-1/2/2025: Predominant rhythm is NSR.  Min HR 54 bpm, max  bpm, avg HR 93 bpm.  3 SVT episodes occurred, the longest lasting 2 hour 2 minutes with a max rate of 190 bpm. SVT is short RP tachycardia, suspicious for AVNRT    ECG 2/27/2025 NSR HR 86 bpm    Patient's palpitation history started back in the 1980s (as a child).  She noticed her heart would race and sometimes she would have syncope. She would then feel her heart racing in her neck for short periods of time. She saw doctors for these issues and was diagnosed with anxiety and also with GERD.  The medications she was given would not stop these episodes from happening.  Over the last 3-6 months, they are coming more frequently and lasting longer.  Since starting metoprolol, the episodes now only last 15-20 minutes, but they are still happening a few times a week.    /75   Pulse 89   Wt 112 kg (246 lb 0.5 oz)   SpO2 98%   BMI 36.33 kg/m²   Current Outpatient Medications on File Prior to Visit   Medication Sig Dispense Refill    albuterol (ProAir HFA) 90 mcg/actuation inhaler Inhale 2 puffs every 4 hours if needed for wheezing or shortness of breath. 8.5 g 5    atorvastatin (Lipitor) 10 mg tablet Take 1 tablet (10 mg) by mouth once daily at bedtime. 100 tablet 3     busPIRone (Buspar) 10 mg tablet Take 1 tablet (10 mg) by mouth 2 times a day. 200 tablet 3    diazePAM (Valium) 5 mg tablet TAKE 1-2 TABS PO ONE HOUR PRIOR TO PROCEDURE. MAY REPEAT UPON ARRIVAL TO PROCEDURE 4 tablet 0    ergocalciferol (Vitamin D-2) 1.25 MG (39435 UT) capsule Take 1 capsule (1,250 mcg) by mouth 1 (one) time per week. 12 capsule 0    ferrous sulfate, 325 mg ferrous sulfate, (iron) tablet Take 1 tablet (325 mg) by mouth 2 times a day before meals. Please take iron pills with 1 glass of orange juice or vitamin C 250 mg 30 minutes before meals or 2 hours after a meal. Avoid taking iron with calcium (milk or dairy products) or thyroid pill. 180 tablet 0    fluticasone furoate-vilanteroL (Breo Ellipta) 100-25 mcg/dose inhaler Inhale 1 puff once daily. 1 each 3    gabapentin (Neurontin) 300 mg capsule Take 1 capsule (300 mg) by mouth once daily at bedtime. 100 capsule 3    MAGNESIUM GLYCINATE ORAL Take 2 capsules by mouth once daily at bedtime.      methocarbamol (Robaxin) 500 mg tablet Take 1 tablet (500 mg) by mouth 3 times a day as needed for muscle spasms. As needed for pain 30 tablet 2    metoprolol succinate XL (Toprol-XL) 25 mg 24 hr tablet Take 1 tablet (25 mg) by mouth once daily. Do not crush or chew. (Patient taking differently: Take 1 tablet (25 mg) by mouth once daily. Do not crush or chew. 2.5 tablets  daily) 90 tablet 3    omeprazole (PriLOSEC) 40 mg DR capsule Take 1 capsule (40 mg) by mouth once daily. 90 capsule 1    sertraline (Zoloft) 100 mg tablet TAKE 1 TABLET ONE TIME DAILY 90 tablet 0    traZODone (Desyrel) 100 mg tablet Take 1 tablet (100 mg) by mouth once daily at bedtime. 90 tablet 1    varenicline (Chantix Continuing Month Box) 1 mg tablet Take 1 tablet (1 mg) by mouth 2 times a day. Do not fill before December 17, 2024. 180 tablet 0    varenicline (Chantix Starting Month Box) 0.5 mg (11)- 1 mg (42) tablet Day 1-3 take 0.5 mg once daily. Day 4-7 take 0.5 mg twice daily. Day 8  and following take 1 mg twice daily. 1 each 0    [DISCONTINUED] sertraline (Zoloft) 100 mg tablet Take 1 tablet (100 mg) by mouth once daily. 90 tablet 0     No current facility-administered medications on file prior to visit.         Review of Systems   Constitutional: Negative for diaphoresis, fever and malaise/fatigue.   HENT:  Negative for congestion and sore throat.    Eyes:  Negative for blurred vision and double vision.   Cardiovascular:  Positive for irregular heartbeat and palpitations. Negative for chest pain, dyspnea on exertion, leg swelling, near-syncope, orthopnea, paroxysmal nocturnal dyspnea and syncope.   Respiratory:  Negative for cough, hemoptysis, shortness of breath, snoring and sputum production.    Hematologic/Lymphatic: Negative for bleeding problem.   Skin:  Negative for rash.   Musculoskeletal:  Negative for falls, joint pain and myalgias.   Gastrointestinal:  Negative for abdominal pain, diarrhea, nausea and vomiting.   Neurological:  Positive for dizziness. Negative for headaches and weakness.   All other systems reviewed and are negative.      Objective   Constitutional:       Appearance: Healthy appearance. Not in distress.   Eyes:      Conjunctiva/sclera: Conjunctivae normal.      Pupils: Pupils are equal, round, and reactive to light.   HENT:    Mouth/Throat:      Pharynx: Oropharynx is clear.   Pulmonary:      Effort: Pulmonary effort is normal.      Breath sounds: Normal breath sounds. No wheezing. No rhonchi. No rales.   Cardiovascular:      PMI at left midclavicular line. Normal rate. Regular rhythm.      Murmurs: There is no murmur.      No gallop.    Pulses:     Intact distal pulses.   Edema:     Peripheral edema absent.   Abdominal:      General: Bowel sounds are normal.      Palpations: Abdomen is soft.      Tenderness: There is no abdominal tenderness.   Musculoskeletal: Normal range of motion.         General: No tenderness. Skin:     General: Skin is warm and dry.    Neurological:      General: No focal deficit present.      Mental Status: Alert and oriented to person, place and time.         Lab Review:   Hospital Outpatient Visit on 01/06/2025   Component Date Value    FVC - Predicted 01/06/2025 3.74     FEV1 - Predicted 01/06/2025 2.95     FVC - PRE 01/06/2025 3.80     FEV1 - Pre 01/06/2025 2.83     FVC - Post 01/06/2025 3.64     FEV1 - Post 01/06/2025 2.98    Lab on 12/11/2024   Component Date Value    Iron 12/11/2024 80     UIBC 12/11/2024 297     TIBC 12/11/2024 377     % Saturation 12/11/2024 21 (L)     Ferritin 12/11/2024 204 (H)     Vitamin D, 25-Hydroxy, T* 12/11/2024 16 (L)     Immunocap IgE 12/11/2024 4.92     Berm[place] Grass IgE 12/11/2024 <0.10     Armani Grass IgE 12/11/2024 <0.10     [place] Grass, [place] B* 12/11/2024 <0.10     Alex Grass IgE 12/11/2024 <0.10     Goosefoot, Cobian's Quarte* 12/11/2024 <0.10     Common Pigweed IgE 12/11/2024 <0.10     Common Ragweed IgE 12/11/2024 <0.10     White Xavi IgE 12/11/2024 <0.10     Common Silver Birch IgE 12/11/2024 <0.10     Box-Elder IgE 12/11/2024 <0.10     Mountain Juniper IgE 12/11/2024 <0.10     [place] IgE 12/11/2024 <0.10     Elm IgE 12/11/2024 <0.10     [place] IgE 12/11/2024 <0.10     Pecan, [place] IgE 12/11/2024 <0.10     Maple [place], [place]* 12/11/2024 <0.10     [place] Tree IgE 12/11/2024 <0.10     Russian Thistle IgE 12/11/2024 <0.10     Sheep Sorrel IgE 12/11/2024 <0.10     Cat Dander IgE 12/11/2024 <0.10     Dog Dander IgE 12/11/2024 <0.10     Alternaria Alternata IgE 12/11/2024 <0.10     Cladosporium Herbarum IgE 12/11/2024 <0.10     English Plantain IgE 12/11/2024 <0.10     Dust Mite (D. farinae) I* 12/11/2024 <0.10     Dust Mite (D. pteronyssi* 12/11/2024 <0.10     French Cockroach IgE 12/11/2024 <0.10     Aspergillus Fumigatus IgE 12/11/2024 <0.10     Oak IgE 12/11/2024 <0.10     Penicillium Chrysogenum * 12/11/2024 <0.10     Aspergillus fumigatus #1* 12/11/2024 Detected (A)      Aspergillus fumigatus #6* 12/11/2024 None Detected     Aureobasidium pullulans * 12/11/2024 None Detected     Premont Serum Ab, Precipi* 12/11/2024 None Detected     Micropolyspora Faeni Ab,* 12/11/2024 None Detected    Admission on 12/07/2024, Discharged on 12/08/2024   Component Date Value    WBC 12/07/2024 11.8 (H)     nRBC 12/07/2024 0.0     RBC 12/07/2024 4.78     Hemoglobin 12/07/2024 14.3     Hematocrit 12/07/2024 42.8     MCV 12/07/2024 90     MCH 12/07/2024 29.9     MCHC 12/07/2024 33.4     RDW 12/07/2024 13.2     Platelets 12/07/2024 354     Neutrophils % 12/07/2024 55.8     Immature Granulocytes %,* 12/07/2024 0.3     Lymphocytes % 12/07/2024 35.6     Monocytes % 12/07/2024 6.3     Eosinophils % 12/07/2024 1.7     Basophils % 12/07/2024 0.3     Neutrophils Absolute 12/07/2024 6.60     Immature Granulocytes Ab* 12/07/2024 0.04     Lymphocytes Absolute 12/07/2024 4.21     Monocytes Absolute 12/07/2024 0.75     Eosinophils Absolute 12/07/2024 0.20     Basophils Absolute 12/07/2024 0.03     Glucose 12/07/2024 111 (H)     Sodium 12/07/2024 138     Potassium 12/07/2024 3.9     Chloride 12/07/2024 105     Bicarbonate 12/07/2024 22     Anion Gap 12/07/2024 15     Urea Nitrogen 12/07/2024 17     Creatinine 12/07/2024 0.91     eGFR 12/07/2024 74     Calcium 12/07/2024 9.1     Albumin 12/07/2024 4.0     Alkaline Phosphatase 12/07/2024 54     Total Protein 12/07/2024 6.4     AST 12/07/2024 20     Bilirubin, Total 12/07/2024 0.3     ALT 12/07/2024 26     Magnesium 12/07/2024 1.75     Ventricular Rate 12/07/2024 98     Atrial Rate 12/07/2024 98     SD Interval 12/07/2024 148     QRS Duration 12/07/2024 90     QT Interval 12/07/2024 356     QTC Calculation(Bazett) 12/07/2024 454     P Axis 12/07/2024 78     R [place] 12/07/2024 77     T [place] 12/07/2024 72     QRS Count 12/07/2024 17     Q Onset 12/07/2024 219     P Onset 12/07/2024 145     P Offset 12/07/2024 196     T Offset 12/07/2024 397     QTC Fredericia 12/07/2024 419      Troponin I, High Sensiti* 12/07/2024 5     Troponin I, High Sensiti* 12/08/2024 6     Thyroid Stimulating Horm* 12/08/2024 3.77     Ventricular Rate 12/08/2024 172     QRS Duration 12/08/2024 136     QT Interval 12/08/2024 282     QTC Calculation(Bazett) 12/08/2024 477     R Axis 12/08/2024 61     T Axis 12/08/2024 67     QRS Count 12/08/2024 28     Q Onset 12/08/2024 221     T Offset 12/08/2024 362     QTC Fredericia 12/08/2024 400     Lactate 12/08/2024 0.5     Blood Culture 12/08/2024 No growth at 4 days -  FINAL REPORT     Blood Culture 12/08/2024 No growth at 4 days -  FINAL REPORT     WBC 12/08/2024 7.1     nRBC 12/08/2024 0.0     RBC 12/08/2024 4.38     Hemoglobin 12/08/2024 12.9     Hematocrit 12/08/2024 39.6     MCV 12/08/2024 90     MCH 12/08/2024 29.5     MCHC 12/08/2024 32.6     RDW 12/08/2024 13.2     Platelets 12/08/2024 295     Glucose 12/08/2024 105 (H)     Sodium 12/08/2024 141     Potassium 12/08/2024 4.0     Chloride 12/08/2024 111 (H)     Bicarbonate 12/08/2024 23     Anion Gap 12/08/2024 11     Urea Nitrogen 12/08/2024 14     Creatinine 12/08/2024 0.76     eGFR 12/08/2024 >90     Calcium 12/08/2024 8.4 (L)     Phosphorus 12/08/2024 3.6     Albumin 12/08/2024 3.5     Magnesium 12/08/2024 1.77     Coronavirus 2019, PCR 12/08/2024 Not Detected     Flu A Result 12/08/2024 Not Detected     Flu B Result 12/08/2024 Not Detected     BNP 12/08/2024 42     Cholesterol 12/08/2024 192     HDL-Cholesterol 12/08/2024 45.2     Cholesterol/HDL Ratio 12/08/2024 4.2     LDL Calculated 12/08/2024 123 (H)     VLDL 12/08/2024 24     Triglycerides 12/08/2024 119     Non HDL Cholesterol 12/08/2024 147     Hemoglobin A1C 12/08/2024 5.7 (H)     Estimated Average Glucose 12/08/2024 117     Procalcitonin 12/08/2024 0.02     Staph/MRSA Screen Culture 12/08/2024 No Staphylococcus aureus isolated     Thyroid Stimulating Horm* 12/08/2024 1.92      I personally reviewed her most recent cardiology note, cardiac testing, lab  work, and hospital notes.    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  Patient's holter monitor is showing evidence of short RP SVT.  Given her previous symptomology, it is suspicious for AVNRT.  We discussed this type of arrhythmia.  It does not always respond to medication.  She's failed metoprolol therapy. We could try verapamil, but her BP is already on the low/normal side.      We discussed risks and benefits of cardiac ablation, all questions asked and answered. Patient would like to proceed with cardiac ablation under sedation.    We will have our schedulers reach out to her to reserve a date.    Patient seen in conjunction with Dr. Rondon

## 2025-02-28 ASSESSMENT — ENCOUNTER SYMPTOMS
DOUBLE VISION: 0
DIARRHEA: 0
SPUTUM PRODUCTION: 0
VOMITING: 0
PALPITATIONS: 1
HEADACHES: 0
NEAR-SYNCOPE: 0
HEMOPTYSIS: 0
WEAKNESS: 0
FALLS: 0
IRREGULAR HEARTBEAT: 1
DIZZINESS: 1
SNORING: 0
BLURRED VISION: 0
FEVER: 0
ABDOMINAL PAIN: 0
DYSPNEA ON EXERTION: 0
NAUSEA: 0
DIAPHORESIS: 0
MYALGIAS: 0
ORTHOPNEA: 0
PND: 0
COUGH: 0
SYNCOPE: 0
SHORTNESS OF BREATH: 0
SORE THROAT: 0

## 2025-03-06 ENCOUNTER — PATIENT OUTREACH (OUTPATIENT)
Dept: PRIMARY CARE | Facility: CLINIC | Age: 55
End: 2025-03-06
Payer: MEDICARE

## 2025-03-20 ENCOUNTER — APPOINTMENT (OUTPATIENT)
Dept: PULMONOLOGY | Facility: CLINIC | Age: 55
End: 2025-03-20
Payer: MEDICARE

## 2025-03-22 ENCOUNTER — HOSPITAL ENCOUNTER (OUTPATIENT)
Dept: RADIOLOGY | Facility: HOSPITAL | Age: 55
Discharge: HOME | End: 2025-03-22
Payer: MEDICARE

## 2025-03-22 DIAGNOSIS — R91.1 LUNG NODULE: ICD-10-CM

## 2025-03-22 PROCEDURE — 71250 CT THORAX DX C-: CPT

## 2025-03-22 PROCEDURE — 71250 CT THORAX DX C-: CPT | Performed by: RADIOLOGY

## 2025-03-24 DIAGNOSIS — I47.19 AVNRT (AV NODAL RE-ENTRY TACHYCARDIA): ICD-10-CM

## 2025-03-25 DIAGNOSIS — R93.89 ABNORMAL CAT SCAN: ICD-10-CM

## 2025-03-26 DIAGNOSIS — R91.1 LUNG NODULE: Primary | ICD-10-CM

## 2025-03-26 DIAGNOSIS — Z01.818 PRE-OP TESTING: ICD-10-CM

## 2025-03-27 NOTE — PROGRESS NOTES
Bronchoscopy Scheduling Request    Pre-bronchoscopy visit: Not needed   Please schedule procedure: Next available    Cytology on-site:  Yes  Location:  [facility]  Performing physician:  Advanced diagnostic bronchoscopist  Referring physician:  Albina Alexis MD, Alex Crowley DO  Indication:  Enlarging right lung nodules, predominantly right lower lobe nodules  Sedation / Anesthesia:  GA  Procedure:  Airway exam, TBNA, TBBx, Navigational bronchoscopy, Radial EBUS, Staging EBUS  Time:  Tier 3  Fluorscopy:   Yes  Imaging needed:  CT Nav - same day as procedure  Labs:  CBC, BMP  Meds:  None  Special Considerations:  Bronchoscopist preference of the nodules: 9.5 mm x 15.1 mm nodule is seen in the right lower lobe. 2 adjacent lobulated nodules are seen in the right lower lobe  posteriorly which measure 13 mm x 6.5 mm and 11.6 mm x 11.7 mm.  Cardiology clearance to discuss if okay to schedule prior to ablation. If after ablation does she need to be on AC or how long.   Reviewed by:  Keila Prado MD on 3/26/25

## 2025-03-31 ENCOUNTER — APPOINTMENT (OUTPATIENT)
Dept: PULMONOLOGY | Facility: CLINIC | Age: 55
End: 2025-03-31
Payer: MEDICARE

## 2025-04-01 LAB
ANION GAP SERPL CALCULATED.4IONS-SCNC: 10 MMOL/L (CALC) (ref 7–17)
BUN SERPL-MCNC: 20 MG/DL (ref 7–25)
BUN/CREAT SERPL: ABNORMAL (CALC) (ref 6–22)
CALCIUM SERPL-MCNC: 9.8 MG/DL (ref 8.6–10.4)
CHLORIDE SERPL-SCNC: 106 MMOL/L (ref 98–110)
CO2 SERPL-SCNC: 25 MMOL/L (ref 20–32)
CREAT SERPL-MCNC: 0.82 MG/DL (ref 0.5–1.03)
EGFRCR SERPLBLD CKD-EPI 2021: 84 ML/MIN/1.73M2
ERYTHROCYTE [DISTWIDTH] IN BLOOD BY AUTOMATED COUNT: 13.2 % (ref 11–15)
GLUCOSE SERPL-MCNC: 112 MG/DL (ref 65–99)
HCT VFR BLD AUTO: 44.9 % (ref 35–45)
HGB BLD-MCNC: 15 G/DL (ref 11.7–15.5)
MCH RBC QN AUTO: 30.6 PG (ref 27–33)
MCHC RBC AUTO-ENTMCNC: 33.4 G/DL (ref 32–36)
MCV RBC AUTO: 91.6 FL (ref 80–100)
PLATELET # BLD AUTO: 337 THOUSAND/UL (ref 140–400)
PMV BLD REES-ECKER: 10.6 FL (ref 7.5–12.5)
POTASSIUM SERPL-SCNC: 4.5 MMOL/L (ref 3.5–5.3)
RBC # BLD AUTO: 4.9 MILLION/UL (ref 3.8–5.1)
SODIUM SERPL-SCNC: 141 MMOL/L (ref 135–146)
WBC # BLD AUTO: 8.1 THOUSAND/UL (ref 3.8–10.8)

## 2025-04-02 ENCOUNTER — ANESTHESIA EVENT (OUTPATIENT)
Dept: GASTROENTEROLOGY | Facility: HOSPITAL | Age: 55
End: 2025-04-02
Payer: MEDICARE

## 2025-04-03 ENCOUNTER — ANESTHESIA (OUTPATIENT)
Dept: GASTROENTEROLOGY | Facility: HOSPITAL | Age: 55
End: 2025-04-03
Payer: MEDICARE

## 2025-04-03 ENCOUNTER — HOSPITAL ENCOUNTER (OUTPATIENT)
Dept: RADIOLOGY | Facility: HOSPITAL | Age: 55
Discharge: HOME | End: 2025-04-03
Payer: MEDICARE

## 2025-04-03 ENCOUNTER — HOSPITAL ENCOUNTER (OUTPATIENT)
Dept: GASTROENTEROLOGY | Facility: HOSPITAL | Age: 55
Discharge: HOME | End: 2025-04-03
Payer: MEDICARE

## 2025-04-03 VITALS
BODY MASS INDEX: 35.55 KG/M2 | OXYGEN SATURATION: 95 % | HEART RATE: 71 BPM | SYSTOLIC BLOOD PRESSURE: 124 MMHG | WEIGHT: 240 LBS | TEMPERATURE: 97.3 F | DIASTOLIC BLOOD PRESSURE: 56 MMHG | HEIGHT: 69 IN | RESPIRATION RATE: 20 BRPM

## 2025-04-03 DIAGNOSIS — R91.1 LUNG NODULE: ICD-10-CM

## 2025-04-03 PROCEDURE — C1819 TISSUE LOCALIZATION-EXCISION: HCPCS

## 2025-04-03 PROCEDURE — 71250 CT THORAX DX C-: CPT

## 2025-04-03 PROCEDURE — 31623 DX BRONCHOSCOPE/BRUSH: CPT | Performed by: INTERNAL MEDICINE

## 2025-04-03 PROCEDURE — 3700000001 HC GENERAL ANESTHESIA TIME - INITIAL BASE CHARGE

## 2025-04-03 PROCEDURE — 7100000002 HC RECOVERY ROOM TIME - EACH INCREMENTAL 1 MINUTE

## 2025-04-03 PROCEDURE — 7100000010 HC PHASE TWO TIME - EACH INCREMENTAL 1 MINUTE

## 2025-04-03 PROCEDURE — 31629 BRONCHOSCOPY/NEEDLE BX EACH: CPT | Performed by: INTERNAL MEDICINE

## 2025-04-03 PROCEDURE — 2500000004 HC RX 250 GENERAL PHARMACY W/ HCPCS (ALT 636 FOR OP/ED): Performed by: ANESTHESIOLOGY

## 2025-04-03 PROCEDURE — 3700000002 HC GENERAL ANESTHESIA TIME - EACH INCREMENTAL 1 MINUTE

## 2025-04-03 PROCEDURE — 2500000004 HC RX 250 GENERAL PHARMACY W/ HCPCS (ALT 636 FOR OP/ED)

## 2025-04-03 PROCEDURE — 31627 NAVIGATIONAL BRONCHOSCOPY: CPT | Performed by: INTERNAL MEDICINE

## 2025-04-03 PROCEDURE — 7100000009 HC PHASE TWO TIME - INITIAL BASE CHARGE

## 2025-04-03 PROCEDURE — 31654 BRONCH EBUS IVNTJ PERPH LES: CPT | Performed by: INTERNAL MEDICINE

## 2025-04-03 PROCEDURE — 2720000007 HC OR 272 NO HCPCS

## 2025-04-03 PROCEDURE — 31653 BRONCH EBUS SAMPLNG 3/> NODE: CPT | Performed by: INTERNAL MEDICINE

## 2025-04-03 PROCEDURE — 31628 BRONCHOSCOPY/LUNG BX EACH: CPT | Performed by: INTERNAL MEDICINE

## 2025-04-03 PROCEDURE — 7100000001 HC RECOVERY ROOM TIME - INITIAL BASE CHARGE

## 2025-04-03 RX ORDER — FENTANYL CITRATE 50 UG/ML
INJECTION, SOLUTION INTRAMUSCULAR; INTRAVENOUS AS NEEDED
Status: DISCONTINUED | OUTPATIENT
Start: 2025-04-03 | End: 2025-04-03

## 2025-04-03 RX ORDER — SODIUM CHLORIDE, SODIUM LACTATE, POTASSIUM CHLORIDE, CALCIUM CHLORIDE 600; 310; 30; 20 MG/100ML; MG/100ML; MG/100ML; MG/100ML
75 INJECTION, SOLUTION INTRAVENOUS CONTINUOUS
Status: SHIPPED | OUTPATIENT
Start: 2025-04-03 | End: 2025-04-03

## 2025-04-03 RX ORDER — HYDRALAZINE HYDROCHLORIDE 20 MG/ML
5 INJECTION INTRAMUSCULAR; INTRAVENOUS EVERY 30 MIN PRN
Status: DISCONTINUED | OUTPATIENT
Start: 2025-04-03 | End: 2025-04-04 | Stop reason: HOSPADM

## 2025-04-03 RX ORDER — ALBUTEROL SULFATE 0.83 MG/ML
2.5 SOLUTION RESPIRATORY (INHALATION) ONCE AS NEEDED
Status: DISCONTINUED | OUTPATIENT
Start: 2025-04-03 | End: 2025-04-04 | Stop reason: HOSPADM

## 2025-04-03 RX ORDER — ONDANSETRON HYDROCHLORIDE 2 MG/ML
4 INJECTION, SOLUTION INTRAVENOUS ONCE AS NEEDED
Status: COMPLETED | OUTPATIENT
Start: 2025-04-03 | End: 2025-04-03

## 2025-04-03 RX ORDER — LABETALOL HYDROCHLORIDE 5 MG/ML
INJECTION, SOLUTION INTRAVENOUS AS NEEDED
Status: DISCONTINUED | OUTPATIENT
Start: 2025-04-03 | End: 2025-04-03

## 2025-04-03 RX ORDER — METHOCARBAMOL 100 MG/ML
1000 INJECTION, SOLUTION INTRAMUSCULAR; INTRAVENOUS ONCE
Status: DISCONTINUED | OUTPATIENT
Start: 2025-04-03 | End: 2025-04-04 | Stop reason: HOSPADM

## 2025-04-03 RX ORDER — DROPERIDOL 2.5 MG/ML
0.62 INJECTION, SOLUTION INTRAMUSCULAR; INTRAVENOUS ONCE AS NEEDED
Status: DISCONTINUED | OUTPATIENT
Start: 2025-04-03 | End: 2025-04-04 | Stop reason: HOSPADM

## 2025-04-03 RX ORDER — PROPOFOL 10 MG/ML
INJECTION, EMULSION INTRAVENOUS AS NEEDED
Status: DISCONTINUED | OUTPATIENT
Start: 2025-04-03 | End: 2025-04-03

## 2025-04-03 RX ORDER — MIDAZOLAM HYDROCHLORIDE 1 MG/ML
INJECTION INTRAMUSCULAR; INTRAVENOUS AS NEEDED
Status: DISCONTINUED | OUTPATIENT
Start: 2025-04-03 | End: 2025-04-03

## 2025-04-03 RX ORDER — OXYCODONE HYDROCHLORIDE 5 MG/1
5 TABLET ORAL EVERY 4 HOURS PRN
Status: DISCONTINUED | OUTPATIENT
Start: 2025-04-03 | End: 2025-04-04 | Stop reason: HOSPADM

## 2025-04-03 RX ORDER — DIPHENHYDRAMINE HYDROCHLORIDE 50 MG/ML
12.5 INJECTION, SOLUTION INTRAMUSCULAR; INTRAVENOUS ONCE AS NEEDED
Status: DISCONTINUED | OUTPATIENT
Start: 2025-04-03 | End: 2025-04-04 | Stop reason: HOSPADM

## 2025-04-03 RX ORDER — ROCURONIUM BROMIDE 10 MG/ML
INJECTION, SOLUTION INTRAVENOUS AS NEEDED
Status: DISCONTINUED | OUTPATIENT
Start: 2025-04-03 | End: 2025-04-03

## 2025-04-03 RX ORDER — HYDROMORPHONE HYDROCHLORIDE 1 MG/ML
0.2 INJECTION, SOLUTION INTRAMUSCULAR; INTRAVENOUS; SUBCUTANEOUS EVERY 5 MIN PRN
Status: DISCONTINUED | OUTPATIENT
Start: 2025-04-03 | End: 2025-04-04 | Stop reason: HOSPADM

## 2025-04-03 RX ORDER — HYDROMORPHONE HYDROCHLORIDE 1 MG/ML
0.4 INJECTION, SOLUTION INTRAMUSCULAR; INTRAVENOUS; SUBCUTANEOUS EVERY 5 MIN PRN
Status: DISCONTINUED | OUTPATIENT
Start: 2025-04-03 | End: 2025-04-04 | Stop reason: HOSPADM

## 2025-04-03 RX ORDER — ONDANSETRON HYDROCHLORIDE 2 MG/ML
INJECTION, SOLUTION INTRAVENOUS AS NEEDED
Status: DISCONTINUED | OUTPATIENT
Start: 2025-04-03 | End: 2025-04-03

## 2025-04-03 RX ORDER — MIDAZOLAM HYDROCHLORIDE 1 MG/ML
0.5 INJECTION INTRAMUSCULAR; INTRAVENOUS
Status: DISCONTINUED | OUTPATIENT
Start: 2025-04-03 | End: 2025-04-04 | Stop reason: HOSPADM

## 2025-04-03 RX ORDER — ACETAMINOPHEN 325 MG/1
650 TABLET ORAL EVERY 4 HOURS PRN
Status: DISCONTINUED | OUTPATIENT
Start: 2025-04-03 | End: 2025-04-04 | Stop reason: HOSPADM

## 2025-04-03 RX ORDER — METOPROLOL TARTRATE 1 MG/ML
INJECTION, SOLUTION INTRAVENOUS AS NEEDED
Status: DISCONTINUED | OUTPATIENT
Start: 2025-04-03 | End: 2025-04-03

## 2025-04-03 RX ORDER — LABETALOL HYDROCHLORIDE 5 MG/ML
5 INJECTION, SOLUTION INTRAVENOUS ONCE AS NEEDED
Status: DISCONTINUED | OUTPATIENT
Start: 2025-04-03 | End: 2025-04-04 | Stop reason: HOSPADM

## 2025-04-03 RX ORDER — LIDOCAINE IN NACL,ISO-OSMOT/PF 30 MG/3 ML
0.1 SYRINGE (ML) INJECTION ONCE
Status: DISCONTINUED | OUTPATIENT
Start: 2025-04-03 | End: 2025-04-04 | Stop reason: HOSPADM

## 2025-04-03 RX ORDER — LIDOCAINE HCL/PF 100 MG/5ML
SYRINGE (ML) INTRAVENOUS AS NEEDED
Status: DISCONTINUED | OUTPATIENT
Start: 2025-04-03 | End: 2025-04-03

## 2025-04-03 RX ADMIN — PROPOFOL 20 MG: 10 INJECTION, EMULSION INTRAVENOUS at 11:59

## 2025-04-03 RX ADMIN — LIDOCAINE HYDROCHLORIDE 60 MG: 20 INJECTION INTRAVENOUS at 10:18

## 2025-04-03 RX ADMIN — SUGAMMADEX 200 MG: 100 INJECTION, SOLUTION INTRAVENOUS at 12:18

## 2025-04-03 RX ADMIN — PROPOFOL 40 MG: 10 INJECTION, EMULSION INTRAVENOUS at 10:27

## 2025-04-03 RX ADMIN — MIDAZOLAM HYDROCHLORIDE 2 MG: 2 INJECTION, SOLUTION INTRAMUSCULAR; INTRAVENOUS at 10:10

## 2025-04-03 RX ADMIN — FENTANYL CITRATE 50 MCG: 50 INJECTION, SOLUTION INTRAMUSCULAR; INTRAVENOUS at 10:33

## 2025-04-03 RX ADMIN — ONDANSETRON 4 MG: 2 INJECTION, SOLUTION INTRAMUSCULAR; INTRAVENOUS at 13:14

## 2025-04-03 RX ADMIN — METOROPROLOL TARTRATE 2 MG: 5 INJECTION, SOLUTION INTRAVENOUS at 11:18

## 2025-04-03 RX ADMIN — PROPOFOL 130 MG: 10 INJECTION, EMULSION INTRAVENOUS at 10:19

## 2025-04-03 RX ADMIN — FENTANYL CITRATE 50 MCG: 50 INJECTION, SOLUTION INTRAMUSCULAR; INTRAVENOUS at 10:55

## 2025-04-03 RX ADMIN — LABETALOL HYDROCHLORIDE 10 MG: 5 INJECTION INTRAVENOUS at 12:11

## 2025-04-03 RX ADMIN — LABETALOL HYDROCHLORIDE 10 MG: 5 INJECTION INTRAVENOUS at 12:04

## 2025-04-03 RX ADMIN — PROPOFOL 100 MCG/KG/MIN: 10 INJECTION, EMULSION INTRAVENOUS at 10:20

## 2025-04-03 RX ADMIN — ONDANSETRON 4 MG: 2 INJECTION INTRAMUSCULAR; INTRAVENOUS at 11:19

## 2025-04-03 RX ADMIN — LIDOCAINE HYDROCHLORIDE 40 MG: 20 INJECTION INTRAVENOUS at 10:19

## 2025-04-03 RX ADMIN — ROCURONIUM BROMIDE 10 MG: 10 INJECTION INTRAVENOUS at 11:27

## 2025-04-03 RX ADMIN — ROCURONIUM BROMIDE 20 MG: 10 INJECTION INTRAVENOUS at 11:49

## 2025-04-03 RX ADMIN — LABETALOL HYDROCHLORIDE 5 MG: 5 INJECTION INTRAVENOUS at 11:59

## 2025-04-03 RX ADMIN — PROPOFOL 30 MG: 10 INJECTION, EMULSION INTRAVENOUS at 11:27

## 2025-04-03 RX ADMIN — ROCURONIUM BROMIDE 60 MG: 10 INJECTION INTRAVENOUS at 10:20

## 2025-04-03 RX ADMIN — DEXAMETHASONE SODIUM PHOSPHATE 10 MG: 4 INJECTION, SOLUTION INTRA-ARTICULAR; INTRALESIONAL; INTRAMUSCULAR; INTRAVENOUS; SOFT TISSUE at 10:34

## 2025-04-03 RX ADMIN — METOROPROLOL TARTRATE 1 MG: 5 INJECTION, SOLUTION INTRAVENOUS at 10:55

## 2025-04-03 RX ADMIN — ROCURONIUM BROMIDE 20 MG: 10 INJECTION INTRAVENOUS at 10:55

## 2025-04-03 RX ADMIN — PROPOFOL 30 MG: 10 INJECTION, EMULSION INTRAVENOUS at 11:50

## 2025-04-03 RX ADMIN — ROCURONIUM BROMIDE 10 MG: 10 INJECTION INTRAVENOUS at 11:19

## 2025-04-03 RX ADMIN — SODIUM CHLORIDE, SODIUM LACTATE, POTASSIUM CHLORIDE, AND CALCIUM CHLORIDE: 600; 310; 30; 20 INJECTION, SOLUTION INTRAVENOUS at 10:10

## 2025-04-03 RX ADMIN — METOROPROLOL TARTRATE 2 MG: 5 INJECTION, SOLUTION INTRAVENOUS at 10:48

## 2025-04-03 ASSESSMENT — PAIN SCALES - GENERAL
PAINLEVEL_OUTOF10: 0 - NO PAIN

## 2025-04-03 ASSESSMENT — ENCOUNTER SYMPTOMS
WHEEZING: 0
FEVER: 0
PALPITATIONS: 0
COUGH: 0
NAUSEA: 0
AGITATION: 0
ABDOMINAL PAIN: 0
DIAPHORESIS: 0
CHEST TIGHTNESS: 0
VOMITING: 0
SHORTNESS OF BREATH: 0
CHILLS: 0

## 2025-04-03 ASSESSMENT — PAIN - FUNCTIONAL ASSESSMENT: PAIN_FUNCTIONAL_ASSESSMENT: 0-10

## 2025-04-03 NOTE — H&P
History Of Present Illness  Gisselle Avalos is a 54 y.o. female presenting with a RLL nodule for navigational bronchoscopy.     Past Medical History  Past Medical History:   Diagnosis Date    Acute bronchitis 01/26/2024    Acute sinusitis 08/07/2008    Backache 06/18/2009    Contracture of muscle, left upper arm 09/02/2021    Contracture of muscle of left upper arm    Dog bite of lower leg 09/19/2022    Encounter for screening for malignant neoplasm of colon 09/02/2021    Colon cancer screening    Encounter for screening for malignant neoplasm of colon 09/02/2021    Colon cancer screening    Other specified abnormal findings of blood chemistry 06/23/2022    D-dimer, elevated    Pain in right knee 04/15/2022    Right knee pain    Person with feared health complaint in whom no diagnosis is made 07/22/2022    Concern about cardiovascular disease without diagnosis    Personal history of other medical treatment 09/02/2021    History of screening mammography    Sleep apnea     Urinary tract infection, site not specified 07/25/2022    Acute UTI     Surgical History  Past Surgical History:   Procedure Laterality Date    CHOLECYSTECTOMY      HYSTERECTOMY  2003    OTHER SURGICAL HISTORY  09/02/2021    Gallbladder surgery    OTHER SURGICAL HISTORY  09/02/2021    Hysterectomy     Social History  She reports that she has been smoking cigarettes. She started smoking about 41 years ago. She has a 41.3 pack-year smoking history. She has never used smokeless tobacco. She reports current alcohol use. She reports that she does not currently use drugs.    Family History  Family History   Problem Relation Name Age of Onset    Breast cancer Mother Netta bain     Arthritis Mother Netta bain     Asthma Mother Netta bain     Hearing loss Father Richard bain     Cancer Father Richard bain         unknown cancer type    Coronary artery disease Father Richard bain 52    Brain cancer Sister Nguyen     Heart disease Sister Nguyen      "Breast cancer Sister Kely     Hypertension Sister Kely     Prostate cancer Brother x3     Diabetes Brother x3     Sleep apnea Daughter      Restless legs syndrome Daughter      Breast cancer Sister Adriana         Allergies  Allergies   Allergen Reactions    Amitriptyline Insomnia    Penicillins Unknown     able to take amoxicillin     Review of Systems   Constitutional:  Negative for chills, diaphoresis and fever.   HENT:  Negative for congestion and nosebleeds.    Respiratory:  Negative for cough, chest tightness, shortness of breath and wheezing.    Cardiovascular:  Negative for chest pain, palpitations and leg swelling.   Gastrointestinal:  Negative for abdominal pain, nausea and vomiting.   Psychiatric/Behavioral:  Negative for agitation.         Physical Exam  Constitutional:       Appearance: Normal appearance.   HENT:      Head: Normocephalic and atraumatic.      Nose: Nose normal.      Mouth/Throat:      Mouth: Mucous membranes are moist.      Pharynx: Oropharynx is clear.   Eyes:      Extraocular Movements: Extraocular movements intact.      Pupils: Pupils are equal, round, and reactive to light.   Cardiovascular:      Rate and Rhythm: Normal rate and regular rhythm.      Heart sounds: No murmur heard.  Pulmonary:      Effort: Pulmonary effort is normal.      Breath sounds: Normal breath sounds.   Abdominal:      General: Bowel sounds are normal.      Palpations: Abdomen is soft.   Musculoskeletal:         General: No swelling.      Right lower leg: No edema.      Left lower leg: No edema.   Neurological:      General: No focal deficit present.      Mental Status: She is alert and oriented to person, place, and time.   Psychiatric:         Mood and Affect: Mood normal.          Last Recorded Vitals  Blood pressure 122/60, pulse 76, temperature 36.2 °C (97.2 °F), temperature source Temporal, resp. rate 16, height 1.753 m (5' 9\"), weight 109 kg (240 lb), SpO2 96%.    Assessment/Plan   RLL " nodule  -Navigational bronchoscopy under GA     Joseluis Roberts MD

## 2025-04-03 NOTE — POST-PROCEDURE NOTE
Verbal and written discharge instructions reviewed with patient and daughter, awa. Questions answered.

## 2025-04-03 NOTE — ANESTHESIA POSTPROCEDURE EVALUATION
Patient: Gisselle Avalos    Procedure Summary       Date: 04/03/25 Room / Location: [facility]    Anesthesia Start: 1010 Anesthesia Stop: 1226    Procedure: BRONCHOSCOPY Diagnosis: Lung nodule    Scheduled Providers: Joseluis Roberts MD Responsible Provider: Rowena Goodwin MD    Anesthesia Type: general ASA Status: 3            Anesthesia Type: general    Vitals Value Taken Time   /71 04/03/25 1225   Temp 36.3 °C (97.3 °F) 04/03/25 1225   Pulse 90 04/03/25 1225   Resp 16 04/03/25 1225   SpO2 93 % 04/03/25 1225       Anesthesia Post Evaluation    Patient location during evaluation: PACU  Patient participation: complete - patient participated  Level of consciousness: sleepy but conscious  Pain management: adequate  Airway patency: patent  Cardiovascular status: acceptable  Respiratory status: acceptable  Hydration status: acceptable  Postoperative Nausea and Vomiting: none        No notable events documented.

## 2025-04-03 NOTE — ANESTHESIA PREPROCEDURE EVALUATION
Patient: Gisselle Avalos    Procedure Information       Date/Time: 04/03/25 1130    Scheduled providers: Joseluis Roberts MD    Procedure: BRONCHOSCOPY    Location: [facility]            Relevant Problems   Cardiac   (+) AVNRT (AV abelardo re-entry tachycardia)   (+) Atypical chest pain   (+) Chest pain, unspecified type   (+) Hyperlipidemia      Pulmonary   (+) THIERRY (obstructive sleep apnea)   (+) SOB (shortness of breath) on exertion      Neuro   (+) Anxiety   (+) Carpal tunnel syndrome   (+) Depression   (+) Generalized anxiety disorder   (+) Lumbar radiculopathy   (+) Peripheral neuropathy   (+) Sciatica      GI   (+) Gastroesophageal reflux disease      Liver   (+) Fatty liver      Hematology   (+) Anemia      Musculoskeletal   (+) Carpal tunnel syndrome       Clinical information reviewed:   Tobacco  Allergies  Meds   Med Hx  Surg Hx   Fam Hx  Soc Hx        NPO Detail:  NPO/Void Status  Carbohydrate Drink Given Prior to Surgery? : N  Date of Last Liquid: 04/03/25  Time of Last Liquid: 0700 (sips with meds)  Date of Last Solid: 04/02/25  Time of Last Solid: 2200  Last Intake Type: Clear fluids  Time of Last Void: 0947         Physical Exam    Airway  Mallampati: II  TM distance: >3 FB  Neck ROM: limited  Comments: Can bite upper lip; ?decreased neck extension   Cardiovascular   Rhythm: regular     Dental   (+) upper dentures, lower dentures  Comments: Lower partial   Pulmonary    Abdominal            Anesthesia Plan    History of general anesthesia?: yes  History of complications of general anesthesia?: no    ASA 3     general     intravenous induction   Anesthetic plan and risks discussed with patient.    Plan discussed with CRNA and attending.

## 2025-04-03 NOTE — DISCHARGE INSTRUCTIONS
The anesthetics, sedatives or narcotics which were given to you today will be acting in your body for the next 24 hours, so you might feel a little sleepy or groggy. This feeling should slowly wear off.   Carefully read and follow the instructions below:   You received sedation today.   Do not drive or operate machinery or power tools of any kind.   No alcoholic beverages today, not even beer or wine.   No over the counter medications that contain alcohol or may cause drowsiness.   Do not make important decisions or sign legal documents.     Do not use Aspirin containing products or non-steroidal medications for the next 24 hours.  (Examples of these types of medications include: Advil, Aleve, Ecotrin, Ibuprofen, Motrin or Naprosyn.  This list is not all-inclusive.  Check with your physician or pharmacist before resuming these medications.)  Tylenol, cough medicine, cough drops or throat lozenges may be used when you are allowed to resume eating and drinking.     Call your physician if any of these symptoms occur:   High fever over 101 degrees or chills (a low grade fever is common for 24 hours)   Rash or hives   Persistent nausea or vomiting   Inability to urinate within 8 hours after the procedure  Go directly to the emergency room if you notice any of the following:   Shortness of breath   Chest pain  Coughing up large amounts of bright red blood greater than a teaspoonful of blood clots (about a teaspoonful for the next 24-48 hours is normal, especially if you had a biopsy)  Resume all normal medications unless directed otherwise by your doctor.       Follow up with your referring physician as previously scheduled.    If you experience any problems or have any questions following discharge, please call:   Before 5 pm: [phone]   After 5pm and on weekends: [phone] / [phone] and ask for the Pulmonary Fellow on-call (Pager Number: [phone])

## 2025-04-03 NOTE — ANESTHESIA PROCEDURE NOTES
Airway  Date/Time: 4/3/2025 10:26 AM  Urgency: elective    Airway not difficult    Staffing  Performed: CRNA   Authorized by: Rowena Goodwin MD    Performed by: JUNE Anand-JULIO  Patient location during procedure: OR    Indications and Patient Condition  Indications for airway management: anesthesia  Spontaneous Ventilation: absent  Sedation level: deep  Preoxygenated: yes  Patient position: sniffing  Mask difficulty assessment: 1 - vent by mask  Planned trial extubation    Final Airway Details  Final airway type: endotracheal airway      Successful airway: ETT  Cuffed: yes   Successful intubation technique: video laryngoscopy  Facilitating devices/methods: intubating stylet and anterior pressure/BURP  Endotracheal tube insertion site: oral  Blade: Lissa  Blade size: #3  ETT size (mm): 8.5  Cormack-Lehane Classification: grade I - full view of glottis  Placement verified by: bronchoscopy, capnometry and palpation of cuff   Measured from: lips  ETT to lips (cm): 21  Number of attempts at approach: 1  Number of other approaches attempted: 1    Other Attempts  Unsuccessful attempted airways: endotracheal tube  Unsuccessful attempted endotracheal techniques: direct laryngoscopy    Additional Comments  DL mac 3 grade 3 view  Nevarez mac 3 grade 1 view

## 2025-04-03 NOTE — LETTER
DearGisselle       3/28/2025                                                                                            INFORMATION FOR YOUR PROCEDURE    INSTRUCTIONS MUST BE FOLLOWED OR YOU RUN THE RISK OF YOUR CASE BEING CANCELED    Information is attached to this e-mail for your upcoming procedure. (Look for the paperclip in your email to access this information)  Lab requisitions (if needed), are also included as well as procedural instructions.     You are scheduled for your Bronchoscopy on 4/3/25  , with Dr. Joseluis Roberts [facility] [address] [place] [address]    09:45 AM    - CT  Scan  [facility]   2nd Floor Radiology  [address]    When finished with the CT scan,   please make your way to [facility] Room 1300.  This is right around the corner from [facility].  Located on the first floor.  There are information desks there for your convenience and if you have questions.    Check In will be at 10:30  AM.  This allows us to prepare you for the actual procedure.                                        Bronchoscopy   Location:  [address] [facility]                                         Bronchoscopy / Endoscopy Suite    NPO (No food or drink) after midnight the night before your procedure. This includes coffee, water, and soda, hard candy, gum or mints.  If taking your morning medications, a small sip of water is allowed to get the medication down.    For your safety, you must have a responsible, adult  accompany you to your procedure.  You will not be permitted to drive yourself home if you have received any type of anesthesia or sedation.    Automated calls about your upcoming scheduled appointments can be quite confusing for patients.    The times may vary depending on what you have scheduled for procedure day. Follow the time/s I have given you on your information sheet so there is no confusion.   Be aware you may receive  conflicting times from different departments.      Blood work will need to be done prior to your procedure, preferably at a  Facility.  There are no restrictions for testing. I have attached a requisition for you.    Please reach out to me with any questions you may have  Mindi  [phone] or William @ [phone]    If you have an emergency (weather or other) on the day of your scheduled procedure and need to cancel for any reason, Please call the Endoscopy Suite @ [phone],  Otherwise, call Mindi @ [phone]      Have a nice day!    Mindi Gibson    Bronchoscopy   Interventional Pulmonology    MD Tawana Yeh MD Sameer Avasarala, MD Catalina Teba, MD Andrew Dunatchik, MD Sruti Brahmandam, MD      [facility]  Pulmonary, Critical Care and Sleep Medicine  [address] [place] [address]  P -[phone]   [phone]  [email or web address]

## 2025-04-06 ENCOUNTER — PATIENT MESSAGE (OUTPATIENT)
Dept: CARDIOLOGY | Facility: HOSPITAL | Age: 55
End: 2025-04-06
Payer: MEDICARE

## 2025-04-06 DIAGNOSIS — I47.10 SUPRAVENTRICULAR TACHYCARDIA: ICD-10-CM

## 2025-04-07 RX ORDER — METOPROLOL SUCCINATE 25 MG/1
25 TABLET, EXTENDED RELEASE ORAL 2 TIMES DAILY
Qty: 180 TABLET | Refills: 3 | Status: SHIPPED | OUTPATIENT
Start: 2025-04-07 | End: 2026-04-07

## 2025-04-08 LAB
LABORATORY COMMENT REPORT: NORMAL
LABORATORY COMMENT REPORT: NORMAL
PATH REPORT.FINAL DX SPEC: NORMAL
PATH REPORT.GROSS SPEC: NORMAL
PATH REPORT.INTRAOP OBS SPEC DOC: NORMAL
PATH REPORT.RELEVANT HX SPEC: NORMAL
PATH REPORT.TOTAL CANCER: NORMAL

## 2025-04-09 NOTE — H&P
History Of Present Illness  Gisselle Aavlos is a 54 y.o. female presenting with palpitations, with intermittent episodes of SVT. Reports last episode occurred yesterday and lasted about 15 minutes. Symptoms of palpitations, dysnpea, light headed and fatigue.   Here for EP study with possible SVT ablation. PMH includes tachycardia, HLD, Syncope, GERD, anxiety, and palpitations since the 1980s   current smoker 3/4 ppd X40 years, encouraged smoking cessation.  Echo 4/2024:  1. Left ventricular systolic function is normal with a 55% estimated ejection fraction. 2. RVSP within normal limits.  Nuclear stress test 12/2024: 1. Negative myocardial perfusion study without evidence of stress-induced ischemia or prior infarction. 2. The left ventricle is normal in size.  3. Normal LV wall motion with a post-stress LV EF estimated greater than 65%.     Holter monitor 12/19/24-1/2/2025: Predominant rhythm is NSR.  Min HR 54 bpm, max  bpm, avg HR 93 bpm.  3 SVT episodes occurred, the longest lasting 2 hour 2 minutes with a max rate of 190 bpm. SVT is short RP tachycardia, suspicious for AVNRT      Past Medical History:  Past Medical History:   Diagnosis Date    Acute bronchitis 01/26/2024    Acute sinusitis 08/07/2008    Backache 06/18/2009    Contracture of muscle, left upper arm 09/02/2021    Contracture of muscle of left upper arm    Dog bite of lower leg 09/19/2022    Encounter for screening for malignant neoplasm of colon 09/02/2021    Colon cancer screening    Encounter for screening for malignant neoplasm of colon 09/02/2021    Colon cancer screening    Other specified abnormal findings of blood chemistry 06/23/2022    D-dimer, elevated    Pain in right knee 04/15/2022    Right knee pain    Person with feared health complaint in whom no diagnosis is made 07/22/2022    Concern about cardiovascular disease without diagnosis    Personal history of other medical treatment 09/02/2021    History of screening  mammography    Sleep apnea     Urinary tract infection, site not specified 07/25/2022    Acute UTI        Past Surgical History:  Past Surgical History:   Procedure Laterality Date    CHOLECYSTECTOMY      HYSTERECTOMY  2003    OTHER SURGICAL HISTORY  09/02/2021    Gallbladder surgery    OTHER SURGICAL HISTORY  09/02/2021    Hysterectomy          Social History:  Social History     Tobacco Use    Smoking status: Every Day     Current packs/day: 1.00     Average packs/day: 1 pack/day for 41.3 years (41.3 ttl pk-yrs)     Types: Cigarettes     Start date: 1/1/1984    Smokeless tobacco: Never   Vaping Use    Vaping status: Never Used   Substance Use Topics    Alcohol use: Yes     Comment: 1a month    Drug use: Not Currently     Comment: in the 80's       Family History:  Family History   Problem Relation Name Age of Onset    Breast cancer Mother Netta bain     Arthritis Mother Netta bain     Asthma Mother Netta bain     Hearing loss Father Richard bain     Cancer Father iRchard bain         unknown cancer type    Coronary artery disease Father Richard bain 52    Brain cancer Sister Nguyen     Heart disease Sister Nguyen     Breast cancer Sister Kely     Hypertension Sister Kely     Prostate cancer Brother x3     Diabetes Brother x3     Sleep apnea Daughter      Restless legs syndrome Daughter      Breast cancer Sister Adriana         Allergies:  Allergies   Allergen Reactions    Amitriptyline Insomnia    Penicillins Unknown     able to take amoxicillin        Home Medications:  Current Outpatient Medications   Medication Instructions    albuterol (ProAir HFA) 90 mcg/actuation inhaler 2 puffs, inhalation, Every 4 hours PRN    atorvastatin (LIPITOR) 10 mg, oral, Nightly    busPIRone (BUSPAR) 10 mg, oral, 2 times daily    diazePAM (Valium) 5 mg tablet TAKE 1-2 TABS PO ONE HOUR PRIOR TO PROCEDURE. MAY REPEAT UPON ARRIVAL TO PROCEDURE    fluticasone furoate-vilanteroL (Breo Ellipta) 100-25 mcg/dose inhaler 1 puff,  inhalation, Daily    gabapentin (NEURONTIN) 300 mg, oral, Nightly    MAGNESIUM GLYCINATE ORAL 2 capsules, Nightly    methocarbamol (ROBAXIN) 500 mg, oral, 3 times daily PRN, As needed for pain    metoprolol succinate XL (TOPROL-XL) 25 mg, oral, 2 times daily, Do not crush or chew.    omeprazole (PRILOSEC) 40 mg, oral, Daily RT    sertraline (ZOLOFT) 100 mg, oral, Daily    traZODone (DESYREL) 100 mg, oral, Nightly    varenicline (Chantix Starting Month Box) 0.5 mg (11)- 1 mg (42) tablet Day 1-3 take 0.5 mg once daily. Day 4-7 take 0.5 mg twice daily. Day 8 and following take 1 mg twice daily.    varenicline tartrate (CHANTIX CONTINUING MONTH BOX) 1 mg, oral, 2 times daily       Inpatient Medications:            Review of Systems   Constitutional:  Positive for fatigue.   HENT: Negative.     Eyes: Negative.    Respiratory:  Positive for shortness of breath.    Cardiovascular:  Positive for palpitations.   Gastrointestinal: Negative.    Endocrine: Negative.    Genitourinary: Negative.    Musculoskeletal: Negative.    Skin: Negative.    Allergic/Immunologic: Negative.    Neurological:  Positive for light-headedness.   Hematological: Negative.    Psychiatric/Behavioral: Negative.            Physical Exam  Constitutional:       General: She is awake. She is not in acute distress.     Appearance: Normal appearance. She is obese. She is not ill-appearing.   HENT:      Head: Normocephalic and atraumatic.      Mouth/Throat:      Mouth: Mucous membranes are moist.      Pharynx: Oropharynx is clear.   Cardiovascular:      Rate and Rhythm: Normal rate and regular rhythm.      Pulses:           Radial pulses are 2+ on the right side and 2+ on the left side.        Dorsalis pedis pulses are 2+ on the right side and 2+ on the left side.      Heart sounds: Normal heart sounds. No murmur heard.  Pulmonary:      Effort: Pulmonary effort is normal.      Breath sounds: Normal breath sounds.   Abdominal:      General: Bowel sounds are  "normal.      Palpations: Abdomen is soft.   Musculoskeletal:         General: Normal range of motion.      Cervical back: Normal range of motion and neck supple.      Right lower leg: No edema.      Left lower leg: No edema.   Skin:     General: Skin is warm and dry.      Capillary Refill: Capillary refill takes less than 2 seconds.   Neurological:      General: No focal deficit present.      Mental Status: She is alert and oriented to person, place, and time. Mental status is at baseline.      Cranial Nerves: Cranial nerves 2-12 are intact.   Psychiatric:         Mood and Affect: Mood and affect normal.         Behavior: Behavior normal.        Sedation Plan    ASA 3     Mallampati class: IV.           NPO since 1900 on 4/14/2025    Last Recorded Vitals  Blood pressure 124/70, pulse 78, temperature 36.7 °C (98 °F), temperature source Oral, resp. rate 20, height 1.753 m (5' 9\"), weight 109 kg (240 lb), SpO2 97%.         Vitals from the Past 24 Hours  Heart Rate:  [78]   Temp:  [36.7 °C (98 °F)]   Resp:  [20]   BP: (124)/(70)   Height:  [175.3 cm (5' 9\")]   Weight:  [109 kg (240 lb)]   SpO2:  [97 %]          Relevant Results    Labs    POCT Glucose:      POCT Urine Pregnancy:       CBC:   Recent Labs     03/31/25 0814 12/08/24  0608 12/07/24 2312 04/04/24  0856 06/21/22  1221 03/08/22  0858   WBC 8.1 7.1 11.8* 7.1 7.4 11.1   HGB 15.0 12.9 14.3 14.9 14.8 13.7   HCT 44.9 39.6 42.8 47.1* 44.8 43.9    295 354 367 346 369   MCV 91.6 90 90 90 92 97     BMP/CMP:   Recent Labs     03/31/25  0814 12/08/24  0608 12/07/24 2312 04/04/24  0856 06/21/22  1221 03/08/22  0858 09/02/21  1455    141 138 139 139 140 142   K 4.5 4.0 3.9 4.8 4.2 4.4 5.0    111* 105 104 105 104 106   BUN 20 14 17 15 15 21 11   CREATININE 0.82 0.76 0.91 0.75 0.80 0.80 0.82   CO2 25 23 22 28 28 25 28   CALCIUM 9.8 8.4* 9.1 9.7 9.9 10.1 10.5*   PROT  --   --  6.4 7.0 7.3 6.7 7.2   BILITOT  --   --  0.3 0.4 0.4 0.3 0.3   ALKPHOS  --   -- " " 54 68 57 51 52   ALT  --   --  26 21 14 18 19   AST  --   --  20 18 15 12 17   GLUCOSE 112* 105* 111* 101* 107* 93 81      Magnesium:   Recent Labs     12/08/24  0608 12/07/24  2312 06/21/22  1221   MG 1.77 1.75 1.88     Lipid Panel:   Recent Labs     12/08/24  0608 04/04/24  0856 09/02/21  1455   CHOL 192 254* 228*   HDL 45.2 50.5 57.9   CHHDL 4.2 5.0 3.9   VLDL 24 28 27   TRIG 119 139 133   NHDL 147 204*  --      Cardiac       No lab exists for component: \"CK\", \"CKMBP\"   Hemoglobin A1C:   Recent Labs     12/08/24  0607   HGBA1C 5.7*     TSH/ Free T4:   Recent Labs     12/08/24  0608 12/08/24  0025 04/04/24  0856 06/21/22  1221 03/08/22  0858 09/02/21  1455   TSH 1.92 3.77 1.69 1.74 4.19* 1.55   FREET4  --   --   --   --  0.73  --      Iron:   Recent Labs     12/11/24  1431 12/08/24  0608   FERRITIN 204*  --    TIBC 377  --    IRONSAT 21*  --    BNP  --  42     Coag:     ABO: No results found for: \"ABO\"    Past Cardiology Tests (Last 3 Years):    EKG:  Recent Labs     02/27/25  1420 12/08/24  0100 12/07/24  2324 04/16/24  1442   ATRRATE 86  --  98 76   VENTRATE 86 172 98 76   PRINT 140  --  148 140   QRSDUR 90 136 90 94   QTCFRED 399 400 419 413   QTCCALCB 423 477 454 429     Encounter Date: 02/27/25   ECG 12 lead (Clinic Performed)   Result Value    Ventricular Rate 86    Atrial Rate 86    CT Interval 140    QRS Duration 90    QT Interval 354    QTC Calculation(Bazett) 423    P Axis 63    R Axis 70    T Axis 19    QRS Count 14    Q Onset 220    P Onset 150    P Offset 210    T Offset 397    QTC Fredericia 399    Narrative    Normal sinus rhythm  Normal ECG  When compared with ECG of 07-DEC-2024 23:32,  No significant change was found  Confirmed by Mannie [id number]) on 3/16/2025 10:15:10 PM     Echo:  Echocardiogram:   Transthoracic Echo (TTE) Complete 04/16/2024    [facility], [address] [place] [address]  Tel [phone] and Fax [phone]    TRANSTHORACIC " ECHOCARDIOGRAM REPORT      Patient Name:      ESTUARDO LUTHER Reading Physician:    [id number] Bryant Banda MD  Study Date:        4/16/2024            Ordering Provider:    [id number] IVÁN CHANEYYOLIS  MRN/PID:           [id number]             Fellow:  Accession#:        [id number]         Nurse:  Date of Birth/Age: 1970 / 53 years Sonographer:          Tosha Moreno  RD  Gender:            F                    Additional Staff:  Height:            170.18 cm            Admit Date:  Weight:            103.87 kg            Admission Status:     Outpatient  BSA / BMI:         2.14 m2 / 35.87      Encounter#:           [id number]  kg/m2  Department Location:  [facility] Non  Invasive  Blood Pressure: 108 /70 mmHg    Study Type:    TRANSTHORACIC ECHO (TTE) COMPLETE  Diagnosis/ICD: Dyspnea, unspecified-R06.00  Indication:    Dyspnea  CPT Code:      Echo Complete w Full Doppler-[id number]    Patient History:  Pertinent History: Dyspnea.    Study Detail: The following Echo studies were performed: 2D, M-Mode, Doppler and  color flow.      PHYSICIAN INTERPRETATION:  Left Ventricle: The left ventricular systolic function is normal, with an estimated ejection fraction of 55%. There are no regional wall motion abnormalities. The left ventricular cavity size is normal. Spectral Doppler shows a normal pattern of left ventricular diastolic filling.  Left Atrium: The left atrium is normal in size.  Right Ventricle: The right ventricle is normal in size. There is normal right ventricular global systolic function.  Right Atrium: The right atrium is normal in size.  Aortic Valve: The aortic valve is trileaflet. There is no evidence of aortic valve stenosis.  There is no evidence of aortic valve regurgitation. The peak instantaneous gradient of the aortic valve is 11.1 mmHg. The mean gradient of the aortic valve is 6.4 mmHg.  Mitral Valve: The mitral valve is normal in structure. There is trace mitral valve  regurgitation.  Tricuspid Valve: The tricuspid valve is structurally normal. There is trace to mild tricuspid regurgitation. The Doppler estimated RVSP is within normal limits at 23.1 mmHg.  Pulmonic Valve: The pulmonic valve is structurally normal. There is physiologic pulmonic valve regurgitation.  Pericardium: There is a trivial pericardial effusion.  Aorta: The aortic root is normal.  Systemic Veins: The inferior vena cava appears to be of normal size. There is IVC inspiratory collapse greater than 50%.  In comparison to the previous echocardiogram(s): There are no prior studies on this patient for comparison purposes.      CONCLUSIONS:  1. Left ventricular systolic function is normal with a 55% estimated ejection fraction.  2. RVSP within normal limits.    QUANTITATIVE DATA SUMMARY:  2D MEASUREMENTS:  Normal Ranges:  IVSd:          1.08 cm   (0.6-1.1cm)  LVPWd:         0.87 cm   (0.6-1.1cm)  LVIDd:         4.58 cm   (3.9-5.9cm)  LVIDs:         3.25 cm  LV Mass Index: 70.9 g/m2  LV % FS        29.0 %    LA VOLUME:  Normal Ranges:  LA Vol A4C:       41.1 ml    (22+/-6mL/m2)  LA Vol A2C:       45.2 ml  LA Vol BP:        44.0 ml  LA Vol Index A4C: 19.2 ml/m2  LA Vol Index A2C: 21.1 ml/m2  LA Vol Index BP:  20.5 ml/m2  LA Volume Index:  20.6 ml/m2  LA Vol A4C:       39.4 ml  LA Vol A2C:       44.3 ml    RA VOLUME BY A/L METHOD:  Normal Ranges:  RA Area A4C: 11.3 cm2    M-MODE MEASUREMENTS:  Normal Ranges:  Ao Root: 2.40 cm (2.0-3.7cm)  LAs:     3.87 cm (2.7-4.0cm)    LV SYSTOLIC FUNCTION BY 2D PLANIMETRY (MOD):  Normal Ranges:  EF-A4C View: 50.6 % (>=55%)  EF-A2C View: 55.9 %  EF-Biplane:  53.5 %    LV DIASTOLIC FUNCTION:  Normal Ranges:  MV Peak E:      0.93 m/s    (0.7-1.2 m/s)  MV Peak A:      1.02 m/s    (0.42-0.7 m/s)  E/A Ratio:      0.92        (1.0-2.2)  MV e'           0.09 m/s    (>8.0)  MV lateral e'   0.09 m/s  MV medial e'    0.11 m/s  MV A Dur:       131.49 msec  E/e' Ratio:     10.36        "(<8.0)  PulmV Sys Kendell:  46.48 cm/s  PulmV Anglin Kendell: 41.04 cm/s  PulmV S/D Kendell:  1.13    MITRAL VALVE:  Normal Ranges:  MV DT: 137 msec (150-240msec)    AORTIC VALVE:  Normal Ranges:  AoV Vmax:                1.67 m/s  (<=1.7m/s)  AoV Peak P.1 mmHg (<20mmHg)  AoV Mean P.4 mmHg  (1.7-11.5mmHg)  LVOT Max Kendell:            1.17 m/s  (<=1.1m/s)  AoV VTI:                 34.79 cm  (18-25cm)  LVOT VTI:                25.00 cm  LVOT Diameter:           1.96 cm   (1.8-2.4cm)  AoV Area, VTI:           2.16 cm2  (2.5-5.5cm2)  AoV Area,Vmax:           2.11 cm2  (2.5-4.5cm2)  AoV Dimensionless Index: 0.72      RIGHT VENTRICLE:  RV Basal 3.00 cm  RV Mid   2.00 cm  RV Major 5.8 cm  TAPSE:   23.0 mm  RV s'    0.19 m/s    TRICUSPID VALVE/RVSP:  Normal Ranges:  Peak TR Velocity: 2.24 m/s  RV Syst Pressure: 23.1 mmHg (< 30mmHg)    Pulmonary Veins:  PulmV Anglin Kendell: 41.04 cm/s  PulmV S/D Kendell:  1.13  PulmV Sys Kendell:  46.48 cm/s      [id number] Bryant Banda MD  Electronically signed on 2024 at 10:30:08 AM        ** Final **    Ejection Fractions:  No results found for: \"EF\"  Cath:  Coronary Angiography: No results found for this or any previous visit from the past 1800 days.    Right Heart Cath: No results found for this or any previous visit from the past 1800 days.    Stress Test:  Nuclear:No results found for this or any previous visit from the past 1800 days.    Metabolic Stress: No results found for this or any previous visit from the past 1800 days.    Cardiac Imaging:  Cardiac Scoring:   CT cardiac scoring wo IV contrast 2025    Narrative  Interpreted By:  Aydee Caban  and Yasmany Martin  STUDY:  CT CARDIAC SCORING WO IV CONTRAST; 2025 8:14 am    INDICATION:  Signs/Symptoms:FMHx early CAD.    COMPARISON:  None.    ACCESSION NUMBER(S):  [id number]    ORDERING CLINICIAN:  IVÁN STOVER    TECHNIQUE:  Using prospective ECG gating, CT scan of the coronary arteries was  performed without " intravenous contrast. Coronary calcium scoring  was  performed according to the method of Agatston.    FINDINGS:  The score and distribution of calcium in the coronary arteries is as  follows:    LM 0,  LAD 0.56,  LCx 0,  RCA 0,    Total 0.56    The visualized ascending thoracic aorta measures 2.9 cm in diameter.  The heart is normal in size. No pericardial effusion is present. Mild  descending aorta calcification present.    No gross evidence of mediastinal or hilar lymphadenopathy or masses  is identified. There is a 5.6 mm noncalcified pulmonary nodule in the  right upper lobe (image 2 of 66), which is stable compared to the  prior CT chest performed 12/07/2024. There is a 4.8 mm noncalcified  pulmonary nodule along the oblique fissure of the right lung (image  13 of 66), probable fissural lymph node.    The visualized subdiaphragmatic structures appear intact.    Impression  1. Coronary artery calcium score of 0.56*.  2. There is a 5.6 mm noncalcified pulmonary nodule in the right upper  lobe.  Incidental Finding:  A non-calcified pulmonary nodule/multiple  non-calcified pulmonary nodules measuring less than 6 mm, likely  benign.  (**-YCF-**)    Instructions:  No further follow-up is required, however, if the  patient has high risk factors for primary lung malignancy, follow-up  noncontrast CT scan chest in 12 months may be obtained. (Rajesh Hamlin et al., Guidelines for management of incidental pulmonary  nodules detected on CT images: From the Fleischner Society 2017,  Radiology. 2017 Jul;284 (1):228-243.) [email or web address].[email or web address]    *Coronary Artery  Agatston score    Score  risk  Very low 1-99  Mildly increased 100-299  Moderately increased >300  Moderate to severely increased >800    Mimi et al. JCCT 2016 (http://dx.doi.org/10.1016/j.jcct.2016.11.003)    ALANIZ 10-Year CHD Risk with Coronary Artery Calcification can be  calcuate using link  below  [email or web address]  Adal wellington al. JACC 2015 (http://dx.doi.org/10.1016/j.j  acc.2015.08.035)    Signed by: Aydee Caban 1/20/2025 8:03 PM  Dictation workstation:   [id number]    Cardiac MRI: No results found for this or any previous visit from the past 1800 days.         Assessment/Plan  Assessment/Plan   Assessment & Plan  AVNRT (AV abelardo re-entry tachycardia)    Palpitations  AVNRT    Here for EP study with possible SVT ablation with Dr. Bonds on 4/15/2025.           NP discussed with Dr. Bonds regarding plan of care/ discharge plan      I spent 35 minutes in the professional and overall care of this patient.      Amalia Harrison, APRN-CNP

## 2025-04-11 DIAGNOSIS — R91.1 LUNG NODULE: ICD-10-CM

## 2025-04-15 ENCOUNTER — PHARMACY VISIT (OUTPATIENT)
Dept: PHARMACY | Facility: CLINIC | Age: 55
End: 2025-04-15
Payer: COMMERCIAL

## 2025-04-15 ENCOUNTER — HOSPITAL ENCOUNTER (OUTPATIENT)
Facility: HOSPITAL | Age: 55
Setting detail: OUTPATIENT SURGERY
Discharge: HOME | End: 2025-04-15
Attending: STUDENT IN AN ORGANIZED HEALTH CARE EDUCATION/TRAINING PROGRAM | Admitting: STUDENT IN AN ORGANIZED HEALTH CARE EDUCATION/TRAINING PROGRAM
Payer: MEDICARE

## 2025-04-15 ENCOUNTER — APPOINTMENT (OUTPATIENT)
Dept: CARDIOLOGY | Facility: HOSPITAL | Age: 55
End: 2025-04-15
Payer: MEDICARE

## 2025-04-15 VITALS
OXYGEN SATURATION: 96 % | BODY MASS INDEX: 35.55 KG/M2 | HEART RATE: 74 BPM | RESPIRATION RATE: 13 BRPM | SYSTOLIC BLOOD PRESSURE: 127 MMHG | WEIGHT: 240 LBS | DIASTOLIC BLOOD PRESSURE: 60 MMHG | HEIGHT: 69 IN | TEMPERATURE: 98 F

## 2025-04-15 DIAGNOSIS — I47.19 AVNRT (AV NODAL RE-ENTRY TACHYCARDIA): Primary | ICD-10-CM

## 2025-04-15 DIAGNOSIS — I47.10 SUPRAVENTRICULAR TACHYCARDIA: ICD-10-CM

## 2025-04-15 LAB
ABO GROUP (TYPE) IN BLOOD: NORMAL
ABO GROUP (TYPE) IN BLOOD: NORMAL
ANTIBODY SCREEN: NORMAL
RH FACTOR (ANTIGEN D): NORMAL
RH FACTOR (ANTIGEN D): NORMAL

## 2025-04-15 PROCEDURE — 93653 COMPRE EP EVAL TX SVT: CPT | Performed by: STUDENT IN AN ORGANIZED HEALTH CARE EDUCATION/TRAINING PROGRAM

## 2025-04-15 PROCEDURE — 2780000003 HC OR 278 NO HCPCS: Performed by: STUDENT IN AN ORGANIZED HEALTH CARE EDUCATION/TRAINING PROGRAM

## 2025-04-15 PROCEDURE — 86900 BLOOD TYPING SEROLOGIC ABO: CPT | Performed by: NURSE PRACTITIONER

## 2025-04-15 PROCEDURE — 36415 COLL VENOUS BLD VENIPUNCTURE: CPT | Performed by: NURSE PRACTITIONER

## 2025-04-15 PROCEDURE — 93005 ELECTROCARDIOGRAM TRACING: CPT

## 2025-04-15 PROCEDURE — 2720000007 HC OR 272 NO HCPCS: Performed by: STUDENT IN AN ORGANIZED HEALTH CARE EDUCATION/TRAINING PROGRAM

## 2025-04-15 PROCEDURE — C1733 CATH, EP, OTHR THAN COOL-TIP: HCPCS | Performed by: STUDENT IN AN ORGANIZED HEALTH CARE EDUCATION/TRAINING PROGRAM

## 2025-04-15 PROCEDURE — 99152 MOD SED SAME PHYS/QHP 5/>YRS: CPT | Performed by: STUDENT IN AN ORGANIZED HEALTH CARE EDUCATION/TRAINING PROGRAM

## 2025-04-15 PROCEDURE — 99153 MOD SED SAME PHYS/QHP EA: CPT | Performed by: STUDENT IN AN ORGANIZED HEALTH CARE EDUCATION/TRAINING PROGRAM

## 2025-04-15 PROCEDURE — C1887 CATHETER, GUIDING: HCPCS | Performed by: STUDENT IN AN ORGANIZED HEALTH CARE EDUCATION/TRAINING PROGRAM

## 2025-04-15 PROCEDURE — 76937 US GUIDE VASCULAR ACCESS: CPT | Performed by: STUDENT IN AN ORGANIZED HEALTH CARE EDUCATION/TRAINING PROGRAM

## 2025-04-15 PROCEDURE — 7100000010 HC PHASE TWO TIME - EACH INCREMENTAL 1 MINUTE: Performed by: STUDENT IN AN ORGANIZED HEALTH CARE EDUCATION/TRAINING PROGRAM

## 2025-04-15 PROCEDURE — 7100000009 HC PHASE TWO TIME - INITIAL BASE CHARGE: Performed by: STUDENT IN AN ORGANIZED HEALTH CARE EDUCATION/TRAINING PROGRAM

## 2025-04-15 PROCEDURE — C1730 CATH, EP, 19 OR FEW ELECT: HCPCS | Performed by: STUDENT IN AN ORGANIZED HEALTH CARE EDUCATION/TRAINING PROGRAM

## 2025-04-15 PROCEDURE — C1760 CLOSURE DEV, VASC: HCPCS | Performed by: STUDENT IN AN ORGANIZED HEALTH CARE EDUCATION/TRAINING PROGRAM

## 2025-04-15 PROCEDURE — RXMED WILLOW AMBULATORY MEDICATION CHARGE

## 2025-04-15 PROCEDURE — 2500000004 HC RX 250 GENERAL PHARMACY W/ HCPCS (ALT 636 FOR OP/ED): Performed by: STUDENT IN AN ORGANIZED HEALTH CARE EDUCATION/TRAINING PROGRAM

## 2025-04-15 RX ORDER — ONDANSETRON HYDROCHLORIDE 2 MG/ML
4 INJECTION, SOLUTION INTRAVENOUS EVERY 8 HOURS PRN
Status: DISCONTINUED | OUTPATIENT
Start: 2025-04-15 | End: 2025-04-15 | Stop reason: HOSPADM

## 2025-04-15 RX ORDER — ONDANSETRON 4 MG/1
4 TABLET, FILM COATED ORAL EVERY 8 HOURS PRN
Status: DISCONTINUED | OUTPATIENT
Start: 2025-04-15 | End: 2025-04-15 | Stop reason: HOSPADM

## 2025-04-15 RX ORDER — METOPROLOL SUCCINATE 25 MG/1
TABLET, EXTENDED RELEASE ORAL
Qty: 60 TABLET | Refills: 0 | OUTPATIENT
Start: 2025-04-15

## 2025-04-15 RX ORDER — FENTANYL CITRATE 50 UG/ML
INJECTION, SOLUTION INTRAMUSCULAR; INTRAVENOUS AS NEEDED
Status: DISCONTINUED | OUTPATIENT
Start: 2025-04-15 | End: 2025-04-15 | Stop reason: HOSPADM

## 2025-04-15 RX ORDER — MIDAZOLAM HYDROCHLORIDE 1 MG/ML
INJECTION, SOLUTION INTRAMUSCULAR; INTRAVENOUS AS NEEDED
Status: DISCONTINUED | OUTPATIENT
Start: 2025-04-15 | End: 2025-04-15 | Stop reason: HOSPADM

## 2025-04-15 RX ORDER — METOPROLOL SUCCINATE 25 MG/1
25 TABLET, EXTENDED RELEASE ORAL 2 TIMES DAILY
Qty: 60 TABLET | Refills: 0 | Status: SHIPPED | OUTPATIENT
Start: 2025-04-15 | End: 2025-05-15

## 2025-04-15 RX ORDER — ACETAMINOPHEN 325 MG/1
650 TABLET ORAL EVERY 4 HOURS PRN
Status: DISCONTINUED | OUTPATIENT
Start: 2025-04-15 | End: 2025-04-15 | Stop reason: HOSPADM

## 2025-04-15 RX ORDER — BUPIVACAINE HYDROCHLORIDE 2.5 MG/ML
INJECTION, SOLUTION EPIDURAL; INFILTRATION; INTRACAUDAL; PERINEURAL AS NEEDED
Status: DISCONTINUED | OUTPATIENT
Start: 2025-04-15 | End: 2025-04-15 | Stop reason: HOSPADM

## 2025-04-15 ASSESSMENT — PAIN - FUNCTIONAL ASSESSMENT: PAIN_FUNCTIONAL_ASSESSMENT: 0-10

## 2025-04-15 ASSESSMENT — ENCOUNTER SYMPTOMS
HEMATOLOGIC/LYMPHATIC NEGATIVE: 1
PSYCHIATRIC NEGATIVE: 1
FATIGUE: 1
PALPITATIONS: 1
MUSCULOSKELETAL NEGATIVE: 1
ALLERGIC/IMMUNOLOGIC NEGATIVE: 1
SHORTNESS OF BREATH: 1
LIGHT-HEADEDNESS: 1
ENDOCRINE NEGATIVE: 1
GASTROINTESTINAL NEGATIVE: 1
EYES NEGATIVE: 1

## 2025-04-15 ASSESSMENT — COLUMBIA-SUICIDE SEVERITY RATING SCALE - C-SSRS
1. IN THE PAST MONTH, HAVE YOU WISHED YOU WERE DEAD OR WISHED YOU COULD GO TO SLEEP AND NOT WAKE UP?: NO
2. HAVE YOU ACTUALLY HAD ANY THOUGHTS OF KILLING YOURSELF?: NO
6. HAVE YOU EVER DONE ANYTHING, STARTED TO DO ANYTHING, OR PREPARED TO DO ANYTHING TO END YOUR LIFE?: NO

## 2025-04-15 ASSESSMENT — PAIN SCALES - GENERAL
PAINLEVEL_OUTOF10: 0 - NO PAIN

## 2025-04-16 LAB
LABORATORY COMMENT REPORT: NORMAL
PATH REPORT.FINAL DX SPEC: NORMAL
PATH REPORT.GROSS SPEC: NORMAL
PATH REPORT.TOTAL CANCER: NORMAL

## 2025-04-17 LAB
ATRIAL RATE: 77 BPM
P AXIS: 56 DEGREES
P OFFSET: 201 MS
P ONSET: 145 MS
PR INTERVAL: 146 MS
Q ONSET: 218 MS
QRS COUNT: 13 BEATS
QRS DURATION: 88 MS
QT INTERVAL: 386 MS
QTC CALCULATION(BAZETT): 436 MS
QTC FREDERICIA: 419 MS
R AXIS: 48 DEGREES
T AXIS: 26 DEGREES
T OFFSET: 411 MS
VENTRICULAR RATE: 77 BPM

## 2025-04-22 ENCOUNTER — APPOINTMENT (OUTPATIENT)
Dept: PRIMARY CARE | Facility: CLINIC | Age: 55
End: 2025-04-22
Payer: MEDICARE

## 2025-04-22 VITALS
DIASTOLIC BLOOD PRESSURE: 56 MMHG | WEIGHT: 243.25 LBS | SYSTOLIC BLOOD PRESSURE: 100 MMHG | OXYGEN SATURATION: 95 % | BODY MASS INDEX: 35.92 KG/M2 | HEART RATE: 81 BPM

## 2025-04-22 DIAGNOSIS — E78.5 HYPERLIPIDEMIA, UNSPECIFIED HYPERLIPIDEMIA TYPE: ICD-10-CM

## 2025-04-22 DIAGNOSIS — G62.89 OTHER POLYNEUROPATHY: ICD-10-CM

## 2025-04-22 DIAGNOSIS — G47.00 INSOMNIA, UNSPECIFIED TYPE: ICD-10-CM

## 2025-04-22 DIAGNOSIS — Z72.0 SMOKING TRYING TO QUIT: ICD-10-CM

## 2025-04-22 DIAGNOSIS — R73.03 PRE-DIABETES: ICD-10-CM

## 2025-04-22 DIAGNOSIS — Z00.00 HEALTHCARE MAINTENANCE: ICD-10-CM

## 2025-04-22 DIAGNOSIS — K21.9 GASTROESOPHAGEAL REFLUX DISEASE, UNSPECIFIED WHETHER ESOPHAGITIS PRESENT: ICD-10-CM

## 2025-04-22 DIAGNOSIS — F41.9 ANXIETY: Primary | ICD-10-CM

## 2025-04-22 DIAGNOSIS — J98.4 SMALL AIRWAYS DISEASE: ICD-10-CM

## 2025-04-22 PROBLEM — L97.822 NON-PRESSURE CHRONIC ULCER OF OTHER PART OF LEFT LOWER LEG WITH FAT LAYER EXPOSED: Status: RESOLVED | Noted: 2024-10-08 | Resolved: 2025-04-22

## 2025-04-22 PROCEDURE — 99214 OFFICE O/P EST MOD 30 MIN: CPT | Performed by: FAMILY MEDICINE

## 2025-04-22 PROCEDURE — G2211 COMPLEX E/M VISIT ADD ON: HCPCS | Performed by: FAMILY MEDICINE

## 2025-04-22 RX ORDER — GABAPENTIN 300 MG/1
300 CAPSULE ORAL NIGHTLY
Qty: 100 CAPSULE | Refills: 1 | Status: SHIPPED | OUTPATIENT
Start: 2025-04-22

## 2025-04-22 RX ORDER — FLUTICASONE FUROATE AND VILANTEROL 100; 25 UG/1; UG/1
1 POWDER RESPIRATORY (INHALATION) DAILY
Qty: 60 EACH | Refills: 5 | Status: SHIPPED | OUTPATIENT
Start: 2025-04-22

## 2025-04-22 RX ORDER — BUPROPION HYDROCHLORIDE 150 MG/1
150 TABLET, EXTENDED RELEASE ORAL 2 TIMES DAILY
Qty: 200 TABLET | Refills: 1 | Status: SHIPPED | OUTPATIENT
Start: 2025-04-22

## 2025-04-22 RX ORDER — TRAZODONE HYDROCHLORIDE 100 MG/1
100 TABLET ORAL NIGHTLY
Qty: 100 TABLET | Refills: 1 | Status: SHIPPED | OUTPATIENT
Start: 2025-04-22

## 2025-04-22 RX ORDER — OMEPRAZOLE 40 MG/1
40 CAPSULE, DELAYED RELEASE ORAL
Qty: 100 CAPSULE | Refills: 1 | Status: SHIPPED | OUTPATIENT
Start: 2025-04-22

## 2025-04-22 RX ORDER — BUSPIRONE HYDROCHLORIDE 10 MG/1
10 TABLET ORAL 2 TIMES DAILY
Qty: 200 TABLET | Refills: 1 | Status: SHIPPED | OUTPATIENT
Start: 2025-04-22

## 2025-04-22 NOTE — PATIENT INSTRUCTIONS
Proton Pump Inhibitors (PPI, e.g., Prilosec/omeprazole, Nexium/esomeprazole, Protonix/pantoprazole, and others) may cause vitamin or mineral deficiencies, kidney damage, dementia, stomach polyps, fractures and thinning of the bones (osteoporosis), intestinal infections including C. diff., lupus. Some of these side effects are more likely with chronic use. There are other possible side effects, and it is recommended, as with any medication, to review all possible side effects. Chronic PPI use may be necessary in certain situations (e.g., Solo esophagus).     Please return for a(n) pre-diabetes, smoking, and medication follow-up appointment and Wellness visit in 3-4 months, after tests to review results and options, earlier if any question or concern. Please schedule additional problem-focused appointment(s) to address additional problem(s).    Avoid taking Biotin (a vitamin, shows up particularly in hair/nail supplements) for a week prior to any blood tests, as it can interfere with certain results. Fasting for labs means 12 hours, nothing to eat or drink, except water and medications, unless directed otherwise.    For assistance with scheduling referrals or consultations, please call [phone]. For laboratory, radiology, and other tests, please call [phone] ([phone] for pediatrics). Please review prescription inserts and published information for possible adverse effects of all medications. Return after testing or consultation to review results and recommendations, if symptoms persist, change, worsen, or return, or if you have any question or concern. If you do not get results within 7-10 days, or you have any question or concern, please send a message, call the office ([phone]), or return to the office for a follow-up appointment. For non-emergencies, you may call the office. For emergencies, call 9-1-1 or go to the nearest Emergency Department. Please schedule additional appointment(s) to  address concern(s) not addressed today. An annual Wellness visit is strongly recommended. A Wellness visit should be dedicated to addressing routine health maintenance matters (e.g., cancer screenings, cardiovascular screening, etc.). Problem-focused visits, typically prompted by symptoms or specific concerns, are usually conducted separately, particularly if multiple or complex problems need to be addressed.    In general, results are not released or discussed over the telephone, but at an appointment or via  [facility]. Results of tests done through [facility] are released via  [facility] (see below).  [email or web address]   [facility] support line: [phone]

## 2025-04-22 NOTE — ASSESSMENT & PLAN NOTE
Defer statin, for now, but advised to discuss also with cardiology.  Orders:    Follow Up In Primary Care

## 2025-04-22 NOTE — ASSESSMENT & PLAN NOTE
Well controlled.   Orders:    traZODone (Desyrel) 100 mg tablet; Take 1 tablet (100 mg) by mouth once daily at bedtime.

## 2025-04-22 NOTE — ASSESSMENT & PLAN NOTE
May try to taper off PPI. Denies h/o Sloo esophagus/precancerous change, or significant GI bleed.  Orders:    omeprazole (PriLOSEC) 40 mg DR capsule; Take 1 capsule (40 mg) by mouth once daily.

## 2025-04-22 NOTE — PROGRESS NOTES
Subjective   Patient ID: Gisselle Avalos is a 54 y.o. female who presents for Follow-up (Pt presents for 3 month check up, trying to quit smoking, refills needed.).  HPI Historian(s): Self    Generally feeling well.    Reports h/o negative/normal EGD at [facility].    Reports Chantix did not help with smoking cessation. Has taken Wellbutrin in the past, didn't decrease her urge to smoke, but wasn't taking it at the time for smoking cessation.    Review of Systems   All other systems reviewed and are negative.    No LMP recorded. Patient has had a hysterectomy.    Tobacco Use History[1]  Social History     Substance and Sexual Activity   Alcohol Use Yes    Comment: 1a month     Social History     Substance and Sexual Activity   Drug Use Not Currently    Comment: in the 80's       Family History[2]    Patient Care Team:  Alex Crowley DO as PCP - General (Family Medicine)  Alex Crowley DO as PCP - [employer] PCP  Kenney Flores MD as Referring Physician (Otolaryngology)    RX Allergies[3]    Prior to Admission medications    Medication Sig Start Date End Date Taking? Authorizing Provider   albuterol (ProAir HFA) 90 mcg/actuation inhaler Inhale 2 puffs every 4 hours if needed for wheezing or shortness of breath. 2/3/25 2/3/26 Yes Albina Alexis MD   busPIRone (Buspar) 10 mg tablet Take 1 tablet (10 mg) by mouth 2 times a day. 12/10/24  Yes Alex Crowley DO   fluticasone furoate-vilanteroL (Breo Ellipta) 100-25 mcg/dose inhaler Inhale 1 puff once daily. 2/3/25  Yes Albina Alexis MD   gabapentin (Neurontin) 300 mg capsule Take 1 capsule (300 mg) by mouth once daily at bedtime. 12/10/24  Yes Alex Crowley DO   metoprolol succinate XL (Toprol-XL) 25 mg 24 hr tablet Take 1 tablet (25 mg) by mouth 2 times a day. Do not crush or chew.  TAKE 1 TABLET TWICE A DAY FOR 2 WEEKS. (4/16/2025-4/29/2025)  STARTING 4/30/2025 TAKE HALF TABLET TWICE A DAY FOR 1 WEEK. THANK STOP. 4/15/25 5/15/25  Yes Amalia Harrison APRN-CNP   metoprolol succinate XL (Toprol-XL) 25 mg 24 hr tablet Take 1 tablet by mouth twice a day for 2 weeks (4/16/25-4/29/25). Starting 4/30/25, take half tablet twice a day for 1 week, then stop. 4/15/25  Yes    omeprazole (PriLOSEC) 40 mg DR capsule Take 1 capsule (40 mg) by mouth once daily. 12/10/24  Yes Alex Crowley DO   traZODone (Desyrel) 100 mg tablet Take 1 tablet (100 mg) by mouth once daily at bedtime. 12/10/24  Yes Alex Crowley DO   atorvastatin (Lipitor) 10 mg tablet Take 1 tablet (10 mg) by mouth once daily at bedtime.  Patient not taking: Reported on 4/15/2025 12/10/24   Alex Crowley DO   diazePAM (Valium) 5 mg tablet TAKE 1-2 TABS PO ONE HOUR PRIOR TO PROCEDURE. MAY REPEAT UPON ARRIVAL TO PROCEDURE  Patient not taking: Reported on 4/22/2025 9/30/24   Atif Gordon MD   methocarbamol (Robaxin) 500 mg tablet Take 1 tablet (500 mg) by mouth 3 times a day as needed for muscle spasms. As needed for pain  Patient not taking: Reported on 4/22/2025 6/20/24   Alex Crowley DO   sertraline (Zoloft) 100 mg tablet TAKE 1 TABLET ONE TIME DAILY  Patient not taking: Reported on 4/22/2025 2/26/25   Alex Crowley DO   varenicline (Chantix Continuing Month Box) 1 mg tablet Take 1 tablet (1 mg) by mouth 2 times a day. Do not fill before December 17, 2024.  Patient not taking: Reported on 4/22/2025 12/17/24   Alex Crowley DO   varenicline (Chantix Starting Month Box) 0.5 mg (11)- 1 mg (42) tablet Day 1-3 take 0.5 mg once daily. Day 4-7 take 0.5 mg twice daily. Day 8 and following take 1 mg twice daily.  Patient not taking: Reported on 4/22/2025 12/10/24 1/8/25  Alex Crowley DO   MAGNESIUM GLYCINATE ORAL Take 2 capsules by mouth once daily at bedtime.  4/22/25  Historical Provider, MD        Objective   /56   Pulse 81   Wt 110 kg (243 lb 4 oz)   SpO2 95%   BMI 35.92 kg/m²     Lab Results   Component Value Date    HGBA1C 5.7 (H) 12/08/2024      Lab Results   Component Value Date    LDLCALC 123 (H) 12/08/2024    LDLCALC 176 (H) 04/04/2024    LDLF 144 (H) 09/02/2021     Lab Results   Component Value Date    TRIG 119 12/08/2024    TRIG 139 04/04/2024    TRIG 133 09/02/2021      Lab Results   Component Value Date    CREATININE 0.82 03/31/2025    CREATININE 0.76 12/08/2024    CREATININE 0.91 12/07/2024    CREATININE 0.75 04/04/2024    CREATININE 0.80 06/21/2022    CREATININE 0.80 03/08/2022    CREATININE 0.82 09/02/2021     Lab Results   Component Value Date    EGFR 84 03/31/2025    EGFR >90 12/08/2024    EGFR 74 12/07/2024    EGFR >90 04/04/2024    GFRF 87 06/21/2022    GFRF 88 03/08/2022      Lab Results   Component Value Date    TSH 1.92 12/08/2024    TSH 3.77 12/08/2024    TSH 1.69 04/04/2024    TSH 1.74 06/21/2022    TSH 4.19 (H) 03/08/2022          Physical Exam  Vitals and nursing note reviewed.   Constitutional:       General: She is not in acute distress.     Appearance: Normal appearance.      Comments: No assistive device presently being used.   HENT:      Head: Normocephalic and atraumatic.   Eyes:      General: No scleral icterus.     Extraocular Movements: Extraocular movements intact.      Conjunctiva/sclera: Conjunctivae normal.   Pulmonary:      Effort: Pulmonary effort is normal. No respiratory distress.   Skin:     General: Skin is warm and dry.      Coloration: Skin is not jaundiced.   Neurological:      Mental Status: She is alert and oriented to person, place, and time. Mental status is at baseline.   Psychiatric:         Behavior: Behavior normal.         Assessment & Plan  Anxiety  Well controlled.   Orders:    busPIRone (Buspar) 10 mg tablet; Take 1 tablet (10 mg) by mouth 2 times a day.    Insomnia, unspecified type  Well controlled.   Orders:    traZODone (Desyrel) 100 mg tablet; Take 1 tablet (100 mg) by mouth once daily at bedtime.    Hyperlipidemia, unspecified hyperlipidemia type  Defer statin, for now, but advised to discuss  also with cardiology.  Orders:    Follow Up In Primary Care    Smoking trying to quit    Orders:    buPROPion SR (Wellbutrin SR) 150 mg 12 hr tablet; Take 1 tablet (150 mg) by mouth 2 times a day. Do not crush, chew, or split.    Follow Up In Primary Care - Medicare Annual; Future    Small airways disease    Orders:    fluticasone furoate-vilanteroL (Breo Ellipta) 100-25 mcg/dose inhaler; Inhale 1 puff once daily.    Other polyneuropathy  Well controlled.   Orders:    gabapentin (Neurontin) 300 mg capsule; Take 1 capsule (300 mg) by mouth once daily at bedtime.    Gastroesophageal reflux disease, unspecified whether esophagitis present  May try to taper off PPI. Denies h/o Solo esophagus/precancerous change, or significant GI bleed.  Orders:    omeprazole (PriLOSEC) 40 mg DR capsule; Take 1 capsule (40 mg) by mouth once daily.    Pre-diabetes    Orders:    Hemoglobin A1C; Future    Follow Up In Primary Care - Medicare Annual; Future    Healthcare maintenance    Orders:    Rubeola Antibody, IgG; Future                        [1]   Social History  Tobacco Use   Smoking Status Every Day    Current packs/day: 1.00    Average packs/day: 1 pack/day for 41.3 years (41.3 ttl pk-yrs)    Types: Cigarettes    Start date: 1/1/1984   Smokeless Tobacco Never   [2]   Family History  Problem Relation Name Age of Onset    Breast cancer Mother Netta bain     Arthritis Mother Netta bain     Asthma Mother eNtta bain     Hearing loss Father Richard bain     Cancer Father Richard bain         unknown cancer type    Coronary artery disease Father Richard bain 52    Brain cancer Sister Nguyen     Heart disease Sister Nguyen     Breast cancer Sister Kely     Hypertension Sister Kely     Prostate cancer Brother x3     Diabetes Brother x3     Sleep apnea Daughter      Restless legs syndrome Daughter      Breast cancer Sister Adriana    [3]   Allergies  Allergen Reactions    Amitriptyline Insomnia    Penicillins Unknown     able to  take amoxicillin

## 2025-04-22 NOTE — ASSESSMENT & PLAN NOTE
Well controlled.   Orders:    busPIRone (Buspar) 10 mg tablet; Take 1 tablet (10 mg) by mouth 2 times a day.

## 2025-04-22 NOTE — ASSESSMENT & PLAN NOTE
Well controlled.   Orders:    gabapentin (Neurontin) 300 mg capsule; Take 1 capsule (300 mg) by mouth once daily at bedtime.

## 2025-04-22 NOTE — ASSESSMENT & PLAN NOTE
Orders:    buPROPion SR (Wellbutrin SR) 150 mg 12 hr tablet; Take 1 tablet (150 mg) by mouth 2 times a day. Do not crush, chew, or split.    Follow Up In Primary Care - Medicare Annual; Future

## 2025-04-23 LAB
EST. AVERAGE GLUCOSE BLD GHB EST-MCNC: 117 MG/DL
EST. AVERAGE GLUCOSE BLD GHB EST-SCNC: 6.5 MMOL/L
HBA1C MFR BLD: 5.7 %
MEV IGG SER IA-ACNC: <13.5 AU/ML

## 2025-04-23 NOTE — POST-PROCEDURE NOTE
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]    Procedure(s):   EP Study Possible Ablation  16166 - AK COMPRE EP EVAL R ATR VNTRC PACG&REC HIS BNDL REC    EP Study Possible Ablation  34628 - AK COMPRE EP EVAL ABLTJ 3D MAPG TX SVT    AK COMPRE EP EVAL ABLTJ 3D MAPG TX SVT [56306]  AK BUNDLE OF HIS RECORDING [99728]  CHG US VASC ACCESS SITS VSL PATENCY NDL ENTRY [22693]  AK PLACEMENT OF OCCLUSIVE DEVICE INTO EITHER A VENOUS OR ARTERIAL ACCESS SITE, POSTSURGICAL OR INTERVENTIONAL PROCEDURE (E.G., ANGIOSEAL PLUG, VASCULAR PLUG) []  AK MOD SED SAME PHYS/QHP INITIAL 15 MINS 5/> YRS [52821]  AK COMPRE EP EVAL ABLTJ 3D MAPG TX SVT [95513]    Procedure Findings:   See below    Description of the Procedure:   AVNRT Ablation    Procedures  SVT ablation (97120), LA Pacing and recording (13046), 3D Mapping (30304), His Bundle Recording (34168), Ultrasound Guided vascular access (18946)    Patient history:  See H&P    Procedure narrative:  The risks, benefits, and alternatives to the procedure and sedation were explained to the patient, and informed consent was obtained. The patient was in the fasting state. A baseline ECG was recorded and showed Sinus rhythm. RF grounding pads were placed. Self-adhesive anterior-posterior defibrillation pads were applied. A defibrillator was used for monitoring and the defibrillator waveform was set to biphasic. The patient was set up for continuous monitoring of surface 12 lead ECG and pulse oximetry. Blood pressure was monitored with automatic cuff measurements. The procedure was performed under IV conscious  sedation supplemented with intermittent moderate sedation.  Bilateral groins were clipped, prepped with chlorhexidine, and draped in the usual sterile fashion. Local anesthesia: Subcutaneous tissues were infiltrated with bipuvacaine groin for local anesthesia.     VASCULAR ACCESS: Femoral access was achieved utilizing the Seldinger technique with a cook needle under ultrasound guidance.   MAPPING: A multi-polar catheter was positioned in the CS. A hexapolar catheter was positioned in the His. Quadripolar catheters were positioned in the RA and RV. 3-D electro-anatomic mapping of the RA was performed. 3-D mapping of the His region was performed. EP study revealed the following:  QRS 78 AH 86 ms, HV 37 ms, VA Wenckebach  ms decremental, AV Wenckebach . AH Jump at 430 ms, also with programmed atrial stimulation at 500/440 ms and echo beats were observed.          Short bursts of atrial pacing and programmed stimulation in the CS were extensively perfomed. Induction of sustained of SVT was achieved with programmed atrial stimulation at 500/450/450 ms. SVT presented with nearly simultaneous R-P. VOD was performed, with VAV response and PPI-TCL of 200 ms. No isuprel was used.    ABLATION: RF ablation was delivered on the region of the slow pathway using 25W power and increasing up to 30W. Slow junctional rhythm was observed. Additional ablations were delivered.     VALIDATION: Extensive testing was then performed with burst pacing and programmed stimulation in the CS. Rare AH jumps were observed at 500/360 ms. No echos or arrhythmias could be induced.       POST-ABLATION: Sheaths were removed and hemostasis was obtained at the femoral venous access sites with Vascade MVP. Five minutes of manual compression was applied to maintain hemostasis.    Complications:  No complications occurred    Summary:  Successful AVNRT RF ablation with slow pathway modification.    Discharge:   The patient left the EP  laboratory in stable condition.   If pt doing well, groin access sites without issues.    Follow up:   Bedrest for 2 hours post sheath removal  No driving, alcohol or making legal decisions for 24 hours.  Remove band-aid/tegaderm in 1 day.  No heavy lifting, strenuous exercise for 1 week  Please call our office if you notice any groin discharge or swelling or fever.   For cardiology electrophysiology emergencies (such as severe heart racing, bleeding, severe groin pain/swelling, high fever, wound discharge, stroke symptoms, or recent severe chest pain) call the office    See complete procedural log and parameters.    ATTESTATION   As the responsible attending physician, I was present and directly participating in the entire procedure without a fellow.    Complications:   None    Estimated Blood Loss:   40 mL    Anesthesia: Moderate Sedation Anesthesia Staff: No anesthesia staff entered.     Complications:   None    Stents/Implants:   Implants       No implant documentation for this case.            Estimated Blood Loss:   20 mL    Anesthesia: Moderate Sedation Anesthesia Staff: No anesthesia staff entered.    Any Specimen(s) Removed:   Order Name Source Comment Collection Info Order Time   TYPE AND SCREEN Blood, Venous  Collected By: Yvette Gurrola RN 4/15/2025 10:28 AM     Release result to [facility]   Immediate        VERAB/VERIFY ABORH Blood, Venous **This is for confirming/verifying history of ABORh on file for transfusion of blood products. If this is not for transfusion, please order an ABO/RH [[id number]]. If you have any questions or unsure what to order, please call the blood bank.** Collected By: Yvette Gurrola RN 4/15/2025 10:28 AM     Release result to [facility]   Immediate            Disposition:   Home      Electronically signed by: Angie Rondon MD, 4/23/2025 4:02 PM

## 2025-04-25 ENCOUNTER — APPOINTMENT (OUTPATIENT)
Dept: PRIMARY CARE | Facility: CLINIC | Age: 55
End: 2025-04-25
Payer: MEDICARE

## 2025-06-02 ENCOUNTER — APPOINTMENT (OUTPATIENT)
Dept: CARDIOLOGY | Facility: HOSPITAL | Age: 55
End: 2025-06-02
Payer: MEDICARE

## 2025-06-02 NOTE — PROGRESS NOTES
[facility] Heart and Vascular Electrophysiology    Patient Name: Gisselle Avalos  Patient : 1970    Referred for  SVT    History of Present Illness:  Gisselle Avalos is a 54 y.o. year old female patient with:    SVT/AVNRT catheter ablation 04/15/2025  Palpitations since the   Tachycardia  HLD  Syncope  GERD  Anxiety        Past Medical History:  She has a past medical history of Acute bronchitis (2024), Acute sinusitis (2008), Backache (2009), Contracture of muscle, left upper arm (2021), Dog bite of lower leg (2022), Encounter for screening for malignant neoplasm of colon (2021), Encounter for screening for malignant neoplasm of colon (2021), Other specified abnormal findings of blood chemistry (2022), Pain in right knee (04/15/2022), Person with feared health complaint in whom no diagnosis is made (2022), Personal history of other medical treatment (2021), Sleep apnea, and Urinary tract infection, site not specified (2022).    Past Surgical History:  She has a past surgical history that includes Other surgical history (2021); Other surgical history (2021); Cholecystectomy; Hysterectomy (); and Cardiac electrophysiology procedure (N/A, 4/15/2025).      Social History:  She reports that she has been smoking cigarettes. She started smoking about 41 years ago. She has a 41.4 pack-year smoking history. She has never used smokeless tobacco. She reports current alcohol use. She reports that she does not currently use drugs.    Family History:  Family History[1]     Allergies:  Amitriptyline and Penicillins    Outpatient Medications:  Current Outpatient Medications   Medication Instructions    albuterol (ProAir HFA) 90 mcg/actuation inhaler 2 puffs, inhalation, Every 4 hours PRN    atorvastatin (LIPITOR) 10 mg, oral, Nightly    buPROPion SR (WELLBUTRIN SR) 150 mg, oral, 2 times daily, Do not crush, chew, or split.     busPIRone (BUSPAR) 10 mg, oral, 2 times daily    fluticasone furoate-vilanteroL (Breo Ellipta) 100-25 mcg/dose inhaler 1 puff, inhalation, Daily    gabapentin (NEURONTIN) 300 mg, oral, Nightly    metoprolol succinate XL (Toprol-XL) 25 mg 24 hr tablet Take 1 tablet by mouth twice a day for 2 weeks (4/16/25-4/29/25). Starting 4/30/25, take half tablet twice a day for 1 week, then stop.    metoprolol succinate XL (TOPROL-XL) 25 mg, oral, 2 times daily, Do not crush or chew.  TAKE 1 TABLET TWICE A DAY FOR 2 WEEKS. (4/16/2025-4/29/2025)  STARTING 4/30/2025 TAKE HALF TABLET TWICE A DAY FOR 1 WEEK. THANK STOP.    omeprazole (PRILOSEC) 40 mg, oral, Daily RT    traZODone (DESYREL) 100 mg, oral, Nightly        ROS:  A 14 point review of systems was done and is negative other than as stated in HPI    Physical Exam    Vitals:  There were no vitals taken for this visit.       Labs:   CBC  Lab Results   Component Value Date    WBC 8.1 03/31/2025    HGB 15.0 03/31/2025    HCT 44.9 03/31/2025    MCV 91.6 03/31/2025     03/31/2025        Renal Function Panel  Lab Results   Component Value Date    GLUCOSE 112 (H) 03/31/2025     03/31/2025    K 4.5 03/31/2025     03/31/2025    CO2 25 03/31/2025    ANIONGAP 10 03/31/2025    BUN 20 03/31/2025    CREATININE 0.82 03/31/2025    GFRF 87 06/21/2022    CALCIUM 9.8 03/31/2025        CMP  Lab Results   Component Value Date    CALCIUM 9.8 03/31/2025    PHOS 3.6 12/08/2024    PROT 6.4 12/07/2024    ALBUMIN 3.5 12/08/2024    AST 20 12/07/2024    ALT 26 12/07/2024    ALKPHOS 54 12/07/2024    BILITOT 0.3 12/07/2024       TSH  Lab Results   Component Value Date    TSH 1.92 12/08/2024    FREET4 0.73 03/08/2022          Cardiac Testing:  ECG  06/02/2025      Echocardiogram  04/16/2024  PHYSICIAN INTERPRETATION:  Left Ventricle: The left ventricular systolic function is normal, with an estimated ejection fraction of 55%. There are no regional wall motion abnormalities. The left  ventricular cavity size is normal. Spectral Doppler shows a normal pattern of left ventricular diastolic filling.  Left Atrium: The left atrium is normal in size.  Right Ventricle: The right ventricle is normal in size. There is normal right ventricular global systolic function.  Right Atrium: The right atrium is normal in size.  Aortic Valve: The aortic valve is trileaflet. There is no evidence of aortic valve stenosis.  There is no evidence of aortic valve regurgitation. The peak instantaneous gradient of the aortic valve is 11.1 mmHg. The mean gradient of the aortic valve is 6.4 mmHg.  Mitral Valve: The mitral valve is normal in structure. There is trace mitral valve regurgitation.  Tricuspid Valve: The tricuspid valve is structurally normal. There is trace to mild tricuspid regurgitation. The Doppler estimated RVSP is within normal limits at 23.1 mmHg.  Pulmonic Valve: The pulmonic valve is structurally normal. There is physiologic pulmonic valve regurgitation.  Pericardium: There is a trivial pericardial effusion.  Aorta: The aortic root is normal.  Systemic Veins: The inferior vena cava appears to be of normal size. There is IVC inspiratory collapse greater than 50%.  In comparison to the previous echocardiogram(s): There are no prior studies on this patient for comparison purposes.        CONCLUSIONS:   1. Left ventricular systolic function is normal with a 55% estimated ejection fraction.   2. RVSP within normal limits.      Holter Monitor  12/19/24-1/2/2025:   Predominant rhythm is NSR.  Min HR 54 bpm, max  bpm, avg HR 93 bpm.  3 SVT episodes occurred, the longest lasting 2 hour 2 minutes with a max rate of 190 bpm. SVT is short RP tachycardia, suspicious for AVNRT           Assessment:       Plan:         [1]   Family History  Problem Relation Name Age of Onset    Breast cancer Mother Netta bain     Arthritis Mother Netta bain     Asthma Mother Netta bain     Hearing loss Father Richard bain      Cancer Father Richard bain         unknown cancer type    Coronary artery disease Father Richard bain 52    Brain cancer Sister Nguyen     Heart disease Sister Nguyen     Breast cancer Sister Kely     Hypertension Sister Kely     Prostate cancer Brother x3     Diabetes Brother x3     Sleep apnea Daughter      Restless legs syndrome Daughter      Breast cancer Sister Adriana

## 2025-07-01 ENCOUNTER — HOSPITAL ENCOUNTER (OUTPATIENT)
Dept: RADIOLOGY | Facility: HOSPITAL | Age: 55
Discharge: HOME | End: 2025-07-01
Payer: MEDICARE

## 2025-07-01 DIAGNOSIS — R91.1 LUNG NODULE: ICD-10-CM

## 2025-07-01 PROCEDURE — 71250 CT THORAX DX C-: CPT | Performed by: RADIOLOGY

## 2025-07-01 PROCEDURE — 71250 CT THORAX DX C-: CPT

## 2025-07-07 DIAGNOSIS — R91.1 LUNG NODULE: ICD-10-CM

## 2025-07-23 DIAGNOSIS — R79.1 ABNORMAL COAGULATION PROFILE: ICD-10-CM

## 2025-07-23 DIAGNOSIS — R91.1 LUNG NODULE: Primary | ICD-10-CM

## 2025-07-28 ENCOUNTER — LAB (OUTPATIENT)
Dept: LAB | Facility: HOSPITAL | Age: 55
End: 2025-07-28
Payer: MEDICARE

## 2025-07-28 DIAGNOSIS — R91.1 SOLITARY PULMONARY NODULE: Primary | ICD-10-CM

## 2025-07-28 LAB
ERYTHROCYTE [DISTWIDTH] IN BLOOD BY AUTOMATED COUNT: 13.1 % (ref 11.5–14.5)
HCT VFR BLD AUTO: 44.9 % (ref 36–46)
HGB BLD-MCNC: 14.8 G/DL (ref 12–16)
INR PPP: 0.9 (ref 0.9–1.1)
MCH RBC QN AUTO: 30.2 PG (ref 26–34)
MCHC RBC AUTO-ENTMCNC: 33 G/DL (ref 32–36)
MCV RBC AUTO: 92 FL (ref 80–100)
NRBC BLD-RTO: 0 /100 WBCS (ref 0–0)
PLATELET # BLD AUTO: 320 X10*3/UL (ref 150–450)
PROTHROMBIN TIME: 10.4 SECONDS (ref 9.8–12.4)
RBC # BLD AUTO: 4.9 X10*6/UL (ref 4–5.2)
WBC # BLD AUTO: 7 X10*3/UL (ref 4.4–11.3)

## 2025-07-28 PROCEDURE — 36415 COLL VENOUS BLD VENIPUNCTURE: CPT

## 2025-07-28 PROCEDURE — 85610 PROTHROMBIN TIME: CPT

## 2025-07-28 PROCEDURE — 85027 COMPLETE CBC AUTOMATED: CPT

## 2025-07-31 ENCOUNTER — HOSPITAL ENCOUNTER (OUTPATIENT)
Dept: RADIOLOGY | Facility: HOSPITAL | Age: 55
Discharge: HOME | End: 2025-07-31
Payer: MEDICARE

## 2025-07-31 VITALS
SYSTOLIC BLOOD PRESSURE: 116 MMHG | OXYGEN SATURATION: 98 % | DIASTOLIC BLOOD PRESSURE: 65 MMHG | TEMPERATURE: 97.9 F | HEART RATE: 71 BPM | RESPIRATION RATE: 17 BRPM

## 2025-07-31 DIAGNOSIS — R91.1 LUNG NODULE: ICD-10-CM

## 2025-07-31 PROCEDURE — 2720000007 HC OR 272 NO HCPCS

## 2025-07-31 PROCEDURE — 2500000004 HC RX 250 GENERAL PHARMACY W/ HCPCS (ALT 636 FOR OP/ED): Performed by: RADIOLOGY

## 2025-07-31 PROCEDURE — 99152 MOD SED SAME PHYS/QHP 5/>YRS: CPT

## 2025-07-31 PROCEDURE — 7100000010 HC PHASE TWO TIME - EACH INCREMENTAL 1 MINUTE

## 2025-07-31 PROCEDURE — 32408 CORE NDL BX LNG/MED PERQ: CPT

## 2025-07-31 PROCEDURE — 7100000009 HC PHASE TWO TIME - INITIAL BASE CHARGE

## 2025-07-31 PROCEDURE — 71045 X-RAY EXAM CHEST 1 VIEW: CPT

## 2025-07-31 PROCEDURE — 99152 MOD SED SAME PHYS/QHP 5/>YRS: CPT | Performed by: RADIOLOGY

## 2025-07-31 RX ORDER — FENTANYL CITRATE 50 UG/ML
INJECTION, SOLUTION INTRAMUSCULAR; INTRAVENOUS
Status: COMPLETED | OUTPATIENT
Start: 2025-07-31 | End: 2025-07-31

## 2025-07-31 RX ORDER — MIDAZOLAM HYDROCHLORIDE 2 MG/2ML
INJECTION, SOLUTION INTRAMUSCULAR; INTRAVENOUS
Status: COMPLETED | OUTPATIENT
Start: 2025-07-31 | End: 2025-07-31

## 2025-07-31 RX ADMIN — FENTANYL CITRATE 50 MCG: 50 INJECTION, SOLUTION INTRAMUSCULAR; INTRAVENOUS at 11:06

## 2025-07-31 RX ADMIN — MIDAZOLAM HYDROCHLORIDE 1 MG: 2 INJECTION, SOLUTION INTRAMUSCULAR; INTRAVENOUS at 11:12

## 2025-07-31 RX ADMIN — FENTANYL CITRATE 50 MCG: 50 INJECTION, SOLUTION INTRAMUSCULAR; INTRAVENOUS at 11:13

## 2025-07-31 RX ADMIN — MIDAZOLAM HYDROCHLORIDE 1 MG: 2 INJECTION, SOLUTION INTRAMUSCULAR; INTRAVENOUS at 11:06

## 2025-07-31 ASSESSMENT — PAIN SCALES - GENERAL
PAINLEVEL_OUTOF10: 0 - NO PAIN

## 2025-07-31 ASSESSMENT — PAIN - FUNCTIONAL ASSESSMENT
PAIN_FUNCTIONAL_ASSESSMENT: 0-10

## 2025-07-31 NOTE — PRE-PROCEDURE NOTE
Interventional Radiology Preprocedure Note    Indication for procedure: History of right lower lobe lung nodule      Relevant Labs:   Lab Results   Component Value Date    CREATININE 0.82 03/31/2025    EGFR 84 03/31/2025    INR 0.9 07/28/2025    PROTIME 10.4 07/28/2025       Planned Sedation/Anesthesia: Moderate    Airway assessment: normal    Directed physical examination:    General: Patient is awake, alert, oriented and in no acute distress, resting comfortably in bed  Lungs: normal respiratory effort  Psych: normal affect     Mallampati: I (soft palate, uvula, fauces, and tonsillar pillars visible)    ASA Score: ASA 2 - Patient with mild systemic disease with no functional limitations    Benefits, risks and alternatives of procedure and planned sedation have been discussed with the patient and/or their representative. All questions answered and they agree to proceed.

## 2025-07-31 NOTE — POST-PROCEDURE NOTE
Interventional Radiology Brief Postprocedure Note    Attending: Dr. Mariangel Dobbs MD    Assistant: Dr. Esteban Lund DO    Diagnosis: Right lower lobe lung nodule . Hx of smoking    Description of procedure:   Successful CT guided lung biopsy of right lower lobe nodule. 1 specimen sent to pathology. Post biopsy images demonstrated a small pneumothorax. A blood patch was used to seal the pneumothorax which appeared decreased in size. Immediate post biopsy chest x ray and a 3 hour post biopsy chest x ray showed no pneumothorax. Please see PACS for details.     Patient tolerated procedure.    Anesthesia:  MAC Moderate    Complications: None    Estimated Blood Loss: none    Medications (Filter: Administrations occurring from 1053 to 1123 on 07/31/25) As of 07/31/25 1123      midazolam (Versed) injection (mg) Total dose:  2 mg      Date/Time Rate/Dose/Volume Action       07/31/25  1106 1 mg Given      1112 1 mg Given               fentaNYL PF (Sublimaze) injection (mcg) Total dose:  100 mcg      Date/Time Rate/Dose/Volume Action       07/31/25  1106 50 mcg Given      1113 50 mcg Given                   No specimens collected      See detailed result report with images in PACS.    The patient tolerated the procedure well without incident or complication and is in stable condition.

## 2025-07-31 NOTE — DISCHARGE INSTRUCTIONS
You received moderate sedation:  - Do not drive a car, or operate any machinery or power tools of any kind.  - Do not drink any alcoholic drinks.  - Do not take any over the counter medications that may cause drowsiness.  - Do not make any important decisions or sign any legal documents.  - You need to have a responsible adult accompany you home.  - You may resume your normal diet.  - We strongly suggest that a responsible adult be with you for the rest of the day and also during the night. This is for your protection and safety.     For questions related to your procedure:  Please call [phone] between the hours of 7:00am-5:00pm Monday through Friday.  Please call [phone] after 5:00pm and on weekends and holidays.     In the event of an emergency call 911 or go to your nearest emergency room.

## 2025-08-01 LAB
LABORATORY COMMENT REPORT: NORMAL
PATH REPORT.FINAL DX SPEC: NORMAL
PATH REPORT.GROSS SPEC: NORMAL
PATH REPORT.RELEVANT HX SPEC: NORMAL
PATH REPORT.TOTAL CANCER: NORMAL

## 2025-08-06 ENCOUNTER — APPOINTMENT (OUTPATIENT)
Dept: RADIOLOGY | Facility: HOSPITAL | Age: 55
End: 2025-08-06
Payer: MEDICARE

## 2025-08-06 LAB
LABORATORY COMMENT REPORT: NORMAL
PATH REPORT.ADDENDUM SPEC: NORMAL
PATH REPORT.FINAL DX SPEC: NORMAL
PATH REPORT.GROSS SPEC: NORMAL
PATH REPORT.RELEVANT HX SPEC: NORMAL
PATH REPORT.TOTAL CANCER: NORMAL

## 2025-08-07 ENCOUNTER — APPOINTMENT (OUTPATIENT)
Dept: RADIOLOGY | Facility: HOSPITAL | Age: 55
End: 2025-08-07
Payer: MEDICARE

## 2025-08-18 ENCOUNTER — OFFICE VISIT (OUTPATIENT)
Dept: PULMONOLOGY | Facility: CLINIC | Age: 55
End: 2025-08-18
Payer: MEDICARE

## 2025-08-18 ENCOUNTER — APPOINTMENT (OUTPATIENT)
Dept: PRIMARY CARE | Facility: CLINIC | Age: 55
End: 2025-08-18
Payer: MEDICARE

## 2025-08-18 VITALS
BODY MASS INDEX: 35.6 KG/M2 | DIASTOLIC BLOOD PRESSURE: 71 MMHG | HEART RATE: 85 BPM | SYSTOLIC BLOOD PRESSURE: 108 MMHG | RESPIRATION RATE: 16 BRPM | WEIGHT: 241.1 LBS | OXYGEN SATURATION: 95 %

## 2025-08-18 DIAGNOSIS — M31.30 NECROTIZING GRANULOMATOUS INFLAMMATION OF LUNG (MULTI): Primary | ICD-10-CM

## 2025-08-18 DIAGNOSIS — Z87.891 SMOKING HISTORY: ICD-10-CM

## 2025-08-18 DIAGNOSIS — R91.1 LUNG NODULE: ICD-10-CM

## 2025-08-18 DIAGNOSIS — J98.4 SMALL AIRWAYS DISEASE: ICD-10-CM

## 2025-08-18 DIAGNOSIS — G47.33 OSA (OBSTRUCTIVE SLEEP APNEA): ICD-10-CM

## 2025-08-18 PROCEDURE — 99212 OFFICE O/P EST SF 10 MIN: CPT

## 2025-08-18 PROCEDURE — 99214 OFFICE O/P EST MOD 30 MIN: CPT | Performed by: INTERNAL MEDICINE

## 2025-08-18 RX ORDER — VARENICLINE TARTRATE 0.5 (11)-1
KIT ORAL
Qty: 66 MG | Refills: 0 | Status: SHIPPED | OUTPATIENT
Start: 2025-08-18 | End: 2025-09-24

## 2025-08-18 RX ORDER — FLUTICASONE FUROATE AND VILANTEROL 100; 25 UG/1; UG/1
1 POWDER RESPIRATORY (INHALATION) DAILY
Qty: 1 EACH | Refills: 11 | Status: SHIPPED | OUTPATIENT
Start: 2025-08-18

## 2025-08-18 ASSESSMENT — PATIENT HEALTH QUESTIONNAIRE - PHQ9
1. LITTLE INTEREST OR PLEASURE IN DOING THINGS: NOT AT ALL
2. FEELING DOWN, DEPRESSED OR HOPELESS: NOT AT ALL
SUM OF ALL RESPONSES TO PHQ9 QUESTIONS 1 AND 2: 0

## 2025-08-18 ASSESSMENT — ENCOUNTER SYMPTOMS: DEPRESSION: 0

## 2025-08-18 ASSESSMENT — PAIN SCALES - GENERAL: PAINLEVEL_OUTOF10: 0-NO PAIN

## 2025-08-21 ENCOUNTER — APPOINTMENT (OUTPATIENT)
Dept: CARDIOLOGY | Facility: HOSPITAL | Age: 55
End: 2025-08-21
Payer: MEDICARE

## 2025-08-22 LAB
ANA SER QL IF: NEGATIVE
ANCA AB SER QL: NORMAL
CCP IGG SERPL-ACNC: <16 UNITS
H CAPSUL AG UR-MCNC: <0.2 NG/ML
H CAPSUL H AB SER QL ID: NEGATIVE
H CAPSUL M AB SER QL ID: POSITIVE
H CAPSUL MYC AB TITR SER CF: ABNORMAL {TITER}
H CAPSUL YST AB TITR SER CF: ABNORMAL {TITER}
MYELOPEROXIDASE AB SER IA-ACNC: <1 AI
PROTEINASE3 AB SER IA-ACNC: <1 AI
RHEUMATOID FACT SERPL-ACNC: <10 IU/ML

## 2025-08-26 LAB
ANA SER QL IF: NEGATIVE
ANCA AB SER QL: NEGATIVE
CCP IGG SERPL-ACNC: <16 UNITS
H CAPSUL AG UR-MCNC: <0.2 NG/ML
H CAPSUL H AB SER QL ID: NEGATIVE
H CAPSUL M AB SER QL ID: POSITIVE
H CAPSUL MYC AB TITR SER CF: ABNORMAL {TITER}
H CAPSUL YST AB TITR SER CF: ABNORMAL {TITER}
MYELOPEROXIDASE AB SER IA-ACNC: <1 AI
PROTEINASE3 AB SER IA-ACNC: <1 AI
RHEUMATOID FACT SERPL-ACNC: <10 IU/ML

## 2025-09-16 ENCOUNTER — APPOINTMENT (OUTPATIENT)
Dept: PRIMARY CARE | Facility: CLINIC | Age: 55
End: 2025-09-16
Payer: MEDICARE

## (undated) DEVICE — Device

## (undated) DEVICE — ELECTRODE KIT, ENSITE X EP SYSTEM SURFACE

## (undated) DEVICE — CLOSURE SYSTEM, VASCULAR, MVP 6-12FR, VENOUS

## (undated) DEVICE — CATHETER, ABLATION, SAFIRE, 7FR/4MM, LRG CURL, BI-DIRECTIONAL

## (undated) DEVICE — INTRODUCER, HEMO, FAST-CATH, 8.5 FR X 63 CM, SR0 038GW, G2

## (undated) DEVICE — COVER, EQUIPMENT, ZERO GRAVITY

## (undated) DEVICE — PACK, ANGIO P2, CUSTOM, LAKE

## (undated) DEVICE — CATHETER, ELECTROPHYSIOLOGY, QUADRIPOLAR,  6FR X 120CM, JSN CURVE (REPROCESSED)

## (undated) DEVICE — CATHETER, SAFIRE, EXT CABLE, 150CM IBI 100W

## (undated) DEVICE — INTRODUCER, SHEATH, FAST-CATH, 6 FR X 23 CM

## (undated) DEVICE — CATHETER, INQUIRY, 6FR X 110CM, 2-5-2MM SPACING, 1MM TIP, LG CURVE STEERABLE

## (undated) DEVICE — PAD, ELECTRODE DEFIB PADPRO ADULT STRL W/ADAPTER

## (undated) DEVICE — INTRODUCER, PERCUTANEOUS, SHEATH AVANTI PLUS, W/MINI GUIDEWIRE, STANDARD LENGTH, 8 FR, 11 CM

## (undated) DEVICE — CATHETER, EP, STEERABLE TIP, 6FR, D-CURVE, 2.5-4-2.5MM SPACING (REPROCESSED)